# Patient Record
Sex: FEMALE | Race: WHITE | NOT HISPANIC OR LATINO | Employment: FULL TIME | URBAN - METROPOLITAN AREA
[De-identification: names, ages, dates, MRNs, and addresses within clinical notes are randomized per-mention and may not be internally consistent; named-entity substitution may affect disease eponyms.]

---

## 2019-03-29 ENCOUNTER — OFFICE VISIT (OUTPATIENT)
Dept: UROLOGY | Facility: CLINIC | Age: 54
End: 2019-03-29
Payer: OTHER GOVERNMENT

## 2019-03-29 VITALS
SYSTOLIC BLOOD PRESSURE: 114 MMHG | DIASTOLIC BLOOD PRESSURE: 60 MMHG | WEIGHT: 192.25 LBS | BODY MASS INDEX: 35.38 KG/M2 | HEART RATE: 77 BPM | HEIGHT: 62 IN

## 2019-03-29 DIAGNOSIS — Z87.442 HISTORY OF KIDNEY STONES: ICD-10-CM

## 2019-03-29 DIAGNOSIS — R10.9 FLANK PAIN: Primary | ICD-10-CM

## 2019-03-29 DIAGNOSIS — N13.39 OTHER HYDRONEPHROSIS: ICD-10-CM

## 2019-03-29 LAB
BILIRUB SERPL-MCNC: ABNORMAL MG/DL
BLOOD URINE, POC: ABNORMAL
COLOR, POC UA: YELLOW
GLUCOSE UR QL STRIP: ABNORMAL
KETONES UR QL STRIP: ABNORMAL
LEUKOCYTE ESTERASE URINE, POC: ABNORMAL
NITRITE, POC UA: ABNORMAL
PH, POC UA: 6
PROTEIN, POC: ABNORMAL
SPECIFIC GRAVITY, POC UA: 1
UROBILINOGEN, POC UA: ABNORMAL

## 2019-03-29 PROCEDURE — 99999 PR PBB SHADOW E&M-EST. PATIENT-LVL III: CPT | Mod: PBBFAC,,, | Performed by: UROLOGY

## 2019-03-29 PROCEDURE — 99203 PR OFFICE/OUTPT VISIT, NEW, LEVL III, 30-44 MIN: ICD-10-PCS | Mod: S$PBB,,, | Performed by: UROLOGY

## 2019-03-29 PROCEDURE — 99999 PR PBB SHADOW E&M-EST. PATIENT-LVL III: ICD-10-PCS | Mod: PBBFAC,,, | Performed by: UROLOGY

## 2019-03-29 PROCEDURE — 81002 URINALYSIS NONAUTO W/O SCOPE: CPT | Mod: PBBFAC,PO | Performed by: UROLOGY

## 2019-03-29 PROCEDURE — 99203 OFFICE O/P NEW LOW 30 MIN: CPT | Mod: S$PBB,,, | Performed by: UROLOGY

## 2019-03-29 PROCEDURE — 99213 OFFICE O/P EST LOW 20 MIN: CPT | Mod: PBBFAC,PO | Performed by: UROLOGY

## 2019-03-29 NOTE — PROGRESS NOTES
Subjective:       Patient ID: Felicia Ruffin is a 53 y.o. female.    Chief Complaint: Flank Pain    HPI     53 year old with lower back pain which is an intermittent problem.  The pain is not positional.  She denies associated nausea and no hematuria.  She has a history of kidney stones.  Renal ultrasound noted mild right hydronephrosis.  Today she is pain free.  She had previous lithotripsy 8-10 years ago but the pain she has now is not as severe.    Urine dipstick shows negative for nitrites, leukocytes, protein, positive for trace blood, glucose.       Past Medical History:   Diagnosis Date    Allergy     Asthma     Hyperlipidemia     Hypertension     Lumbago     PONV (postoperative nausea and vomiting)     Pre-diabetes     Skin sensitivity     Vitamin D deficiency      Past Surgical History:   Procedure Laterality Date     SECTION      times 1    CYSTOSCOPY, WITH RETROGRADE PYELOGRAM Left 10/10/2012    Performed by Brigido [facility] at [facility] OR    DILATION AND CURETTAGE OF UTERUS      LITHOTRIPSY, ESWL Left 10/10/2012    Performed by Brigido [facility] at [facility] OR    tonsillectomy      TONSILLECTOMY         Current Outpatient Medications:     atorvastatin (LIPITOR) 20 MG tablet, TAKE 1 TABLET AT BEDTIME, Disp: 90 tablet, Rfl: 2    azilsartan med-chlorthalidone (EDARBYCLOR) 40-25 mg Tab, Take by mouth., Disp: , Rfl:     budesonide-formoterol 80-4.5 mcg (SYMBICORT) 80-4.5 mcg/actuation HFAA, Inhale 2 puffs into the lungs. Controller, Disp: , Rfl:     cetirizine (ZYRTEC) 10 MG tablet, TAKE 1 TABLET DAILY, Disp: 90 tablet, Rfl: 1    cholecalciferol, vitamin D3, 2,000 unit Tab, 2 tablets. Every day, Disp: , Rfl:     empagliflozin-metformin (SYNJARDY XR) 12.5-1,000 mg TBph, Take by mouth., Disp: , Rfl:     folic acid (FOLVITE) 400 MCG tablet, 1 tablet. Every day, Disp: , Rfl:     meloxicam (MOBIC) 15 MG tablet, TAKE 1 TABLET DAILY, Disp: 90 tablet, Rfl: 1    metoprolol (TOPROL XL) 50 MG 24 hr  tablet, Take 1 tablet (50 mg total) by mouth once daily. Every day, Disp: 90 tablet, Rfl: 3    multivitamin (THERAGRAN) per tablet, 1 tablet. Every day, Disp: , Rfl:     potassium 99 mg Tab, Take by mouth once., Disp: , Rfl:     empagliflozin (JARDIANCE) 25 mg Tab, Take 25 mg by mouth., Disp: , Rfl:     Review of Systems   Constitutional: Negative for fever.   Eyes: Negative for visual disturbance.   Respiratory: Negative for shortness of breath.    Cardiovascular: Negative for chest pain.   Gastrointestinal: Negative for abdominal pain and nausea.   Genitourinary: Positive for flank pain. Negative for dysuria and hematuria.   Musculoskeletal: Negative for gait problem.   Skin: Negative for rash.   Neurological: Negative for seizures.   Psychiatric/Behavioral: Negative for confusion.       Objective:      Physical Exam   Constitutional: She is oriented to person, place, and time. She appears well-developed and well-nourished.   HENT:   Head: Normocephalic.   Eyes: Conjunctivae and EOM are normal.   Neck: Normal range of motion.   Cardiovascular: Normal rate.   Pulmonary/Chest: Effort normal.   Abdominal: Soft. She exhibits no distension and no mass. There is no tenderness.   Genitourinary:   Genitourinary Comments: Bladder non-tender and nondistended  No CVA tenderness   Musculoskeletal: She exhibits no edema.   Neurological: She is alert and oriented to person, place, and time.   Skin: Skin is warm and dry. No rash noted. No erythema.   Psychiatric: She has a normal mood and affect. Her behavior is normal.   Vitals reviewed.      Assessment:       1. Flank pain    2. Other hydronephrosis    3. History of kidney stones        Plan:       Flank pain  -     POCT URINE DIPSTICK WITHOUT MICROSCOPE  -     CT Renal Stone Study ABD Pelvis WO; Future; Expected date: 03/29/2019    Other hydronephrosis    History of kidney stones

## 2019-04-11 ENCOUNTER — HOSPITAL ENCOUNTER (OUTPATIENT)
Dept: RADIOLOGY | Facility: HOSPITAL | Age: 54
Discharge: HOME | End: 2019-04-11
Attending: UROLOGY
Payer: OTHER GOVERNMENT

## 2019-04-11 DIAGNOSIS — R10.9 FLANK PAIN: ICD-10-CM

## 2019-04-11 PROCEDURE — 74176 CT RENAL STONE STUDY ABD PELVIS WO: ICD-10-PCS | Mod: 26,,, | Performed by: RADIOLOGY

## 2019-04-11 PROCEDURE — 74176 CT ABD & PELVIS W/O CONTRAST: CPT | Mod: TC,PO

## 2019-04-11 PROCEDURE — 74176 CT ABD & PELVIS W/O CONTRAST: CPT | Mod: 26,,, | Performed by: RADIOLOGY

## 2020-01-02 ENCOUNTER — TELEPHONE (OUTPATIENT)
Dept: SURGERY | Facility: CLINIC | Age: 55
End: 2020-01-02

## 2020-01-02 NOTE — PROGRESS NOTES
Called pt to see if she has an outside testing done, left msg to bring records with her, call back number given

## 2020-01-07 ENCOUNTER — OFFICE VISIT (OUTPATIENT)
Dept: SURGERY | Facility: CLINIC | Age: 55
End: 2020-01-07
Payer: OTHER GOVERNMENT

## 2020-01-07 VITALS
WEIGHT: 192 LBS | DIASTOLIC BLOOD PRESSURE: 74 MMHG | SYSTOLIC BLOOD PRESSURE: 155 MMHG | BODY MASS INDEX: 35.33 KG/M2 | HEART RATE: 89 BPM | HEIGHT: 62 IN

## 2020-01-07 DIAGNOSIS — K60.1 CHRONIC ANTERIOR ANAL FISSURE: Primary | ICD-10-CM

## 2020-01-07 DIAGNOSIS — K64.4 RESIDUAL HEMORRHOIDAL SKIN TAGS: ICD-10-CM

## 2020-01-07 PROCEDURE — 99999 PR PBB SHADOW E&M-EST. PATIENT-LVL III: CPT | Mod: PBBFAC,,, | Performed by: COLON & RECTAL SURGERY

## 2020-01-07 PROCEDURE — 99203 OFFICE O/P NEW LOW 30 MIN: CPT | Mod: S$PBB,,, | Performed by: COLON & RECTAL SURGERY

## 2020-01-07 PROCEDURE — 99203 PR OFFICE/OUTPT VISIT, NEW, LEVL III, 30-44 MIN: ICD-10-PCS | Mod: S$PBB,,, | Performed by: COLON & RECTAL SURGERY

## 2020-01-07 PROCEDURE — 99999 PR PBB SHADOW E&M-EST. PATIENT-LVL III: ICD-10-PCS | Mod: PBBFAC,,, | Performed by: COLON & RECTAL SURGERY

## 2020-01-07 PROCEDURE — 99213 OFFICE O/P EST LOW 20 MIN: CPT | Mod: PBBFAC | Performed by: COLON & RECTAL SURGERY

## 2020-01-07 RX ORDER — NAPROXEN SODIUM 220 MG/1
81 TABLET, FILM COATED ORAL NIGHTLY
COMMUNITY

## 2020-01-07 RX ORDER — HYDROCHLOROTHIAZIDE 12.5 MG/1
12.5 TABLET ORAL 2 TIMES DAILY
COMMUNITY
End: 2021-11-22

## 2020-01-07 NOTE — LETTER
January 7, 2020      Britton Bernardo MD  [address] [address]           [facility]  [address]  [facility] [address]  Phone: [phone]          Patient: Felicia Ruffin   MR Number: [id number]   YOB: 1965   Date of Visit: 1/7/2020       Dear Dr. Britton Bernardo:    Thank you for referring Felicia Ruffin to me for evaluation. Attached you will find relevant portions of my assessment and plan of care.    If you have questions, please do not hesitate to call me. I look forward to following Felicia Ruffin along with you.    Sincerely,    Brigido Contreras MD    Enclosure  CC:  No Recipients    If you would like to receive this communication electronically, please contact [email or web address] or [phone] to request more information on [employer] access.    For providers and/or their staff who would like to refer a patient to [facility], please contact us through our one-stop-shop provider referral line, [facility], at [phone].    If you feel you have received this communication in error or would no longer like to receive these types of communications, please e-mail [email or web address]

## 2020-01-07 NOTE — PROGRESS NOTES
CRS Office Visit History and Physical    Referring Md:   Britton Bernardo Md  [address] [place] [address]    SUBJECTIVE:     Chief Complaint:  Anal fissure    History of Present Illness:  The patient is new patient to this practice.   Course is as follows:  Patient is a 54 y.o. female presents with anal fissure.  Fourteen year history of perianal pain with bowel movements.  Intermittently improves with taking NSAIDs.  Complains of intermittent bleeding with her bowel movements as well as intermittent tearing perianal pain. She has bowel movements daily and spends approximately 20 min on the toilet for each bowel movement.  No family history of colon rectal cancer.  No family history of inflammatory bowel disease.  No episodes of fecal incontinence. No past anorectal surgeries.    Last Colonoscopy: 2016  Diminutive polyp in the rectum cold biopsied.  Pathology demonstrated hyperplastic polyp.  Intermittent anal fissure seen.      Review of patient's allergies indicates:   Allergen Reactions    Codeine Itching    Other      Tape-sensitive skin    Percodan [oxycodone hcl-oxycodone-asa] Itching    Vicodin [hydrocodone-acetaminophen] Itching       Past Medical History:   Diagnosis Date    Allergy     Asthma     Hyperlipidemia     Hypertension     Lumbago     PONV (postoperative nausea and vomiting)     Pre-diabetes     Skin sensitivity     Vitamin D deficiency      Past Surgical History:   Procedure Laterality Date     SECTION      times 1    DILATION AND CURETTAGE OF UTERUS      tonsillectomy      TONSILLECTOMY       Family History   Problem Relation Age of Onset    Stroke Father     Hypertension Father     Heart disease Father     Hypertension Mother      Social History     Tobacco Use    Smoking status: Never Smoker    Smokeless tobacco: Never Used   Substance Use Topics    Alcohol use: No    Drug use: No        Review of Systems:  Review of Systems   Constitutional:  "Negative for chills, diaphoresis, fever, malaise/fatigue and weight loss.   HENT: Negative for congestion.    Respiratory: Negative for shortness of breath.    Cardiovascular: Negative for chest pain and leg swelling.   Gastrointestinal: Positive for blood in stool. Negative for abdominal pain, constipation, nausea and vomiting.   Genitourinary: Negative for dysuria.   Musculoskeletal: Negative for back pain and myalgias.   Skin: Negative for rash.   Neurological: Negative for dizziness and weakness.   Endo/Heme/Allergies: Does not bruise/bleed easily.   Psychiatric/Behavioral: Negative for depression.       OBJECTIVE:     Vital Signs (Most Recent)  BP (!) 155/74 (BP Location: Right arm, Patient Position: Sitting, BP Method: Large (Automatic))   Pulse 89   Ht 5' 2" (1.575 m)   Wt 87.1 kg (192 lb 0.3 oz)   LMP 07/01/2017   BMI 35.12 kg/m²     Physical Exam:  General: White female in no distress   Neuro: alert and oriented x 4.  Moves all extremities.     HEENT: no icterus.  Trachea midline  Respiratory: respirations are even and unlabored  Cardiac: regular rate  Abdomen:  Soft, nontender, no masses  Extremities: Warm dry and intact  Skin: no rashes  Anorectal:  External exam with small volume external hemorrhoids.  Hypertonic internal anal sphincter.  Tender in the anterior midline consistent with anterior anal fissure    Labs: H&H 12 and 38.  Albumin 4.2.  Creatinine normal.    Imaging:  No recent abdominal imaging      ASSESSMENT/PLAN:     Felicia VINSON was seen today for anal fissure.    Diagnoses and all orders for this visit:    Chronic anterior anal fissure  -     Case Request Operating Room: SPHINCTEROTOMY, ANAL, INTERNAL, LATERAL, & excisional hemorrhoidectomy, prone    Residual hemorrhoidal skin tags  -     Case Request Operating Room: SPHINCTEROTOMY, ANAL, INTERNAL, LATERAL, & excisional hemorrhoidectomy, prone        54-year-old woman with longstanding perianal pain and exam consistent with anterior midline " anal fissure with associated perianal skin tags and external hemorrhoids.  Discussed that due to the longstanding nature of the fissure, topical medicine is unlikely to be beneficial to her.  Therefore, recommended limited lateral internal anal sphincterotomy.  At that time, limited excisional external hemorrhoidectomy can also be performed.  We discussed the expected follow-up of excisional hemorrhoidectomy to be approximately 4 weeks.  Consents were signed today.  She will need to be in prone position.  She is significant reactions to the multiple pain medications.  Postoperatively, she will need to be given oral Dilaudid as well as Benadryl to help with itching.  We can also try gabapentin for pain relief.    MARICRUZ Contreras MD, FACS  Staff Surgeon  Colon & Rectal Surgery

## 2020-03-06 ENCOUNTER — TELEPHONE (OUTPATIENT)
Dept: SURGERY | Facility: CLINIC | Age: 55
End: 2020-03-06

## 2020-03-06 NOTE — TELEPHONE ENCOUNTER
----- Message from Farideh Dexter sent at 3/6/2020  8:22 AM CST -----  Contact: pt#[phone]  Pt is calling to reschedule procedure. Please call

## 2020-04-14 ENCOUNTER — TELEPHONE (OUTPATIENT)
Dept: SURGERY | Facility: CLINIC | Age: 55
End: 2020-04-14

## 2020-04-14 NOTE — TELEPHONE ENCOUNTER
Notified pt that Dr Contreras is unavailable that day , she states the following week 6/29 would be fine.  Will notify scheduling

## 2020-04-14 NOTE — TELEPHONE ENCOUNTER
----- Message from Farideh Dexter sent at 4/14/2020 12:07 PM CDT -----  Contact: pt   Pt is calling to reschedule surgery for June 22

## 2020-05-21 ENCOUNTER — TELEPHONE (OUTPATIENT)
Dept: SURGERY | Facility: CLINIC | Age: 55
End: 2020-05-21

## 2020-05-21 DIAGNOSIS — Z01.818 PREOP TESTING: Primary | ICD-10-CM

## 2020-06-19 ENCOUNTER — TELEPHONE (OUTPATIENT)
Dept: SURGERY | Facility: CLINIC | Age: 55
End: 2020-06-19

## 2020-06-19 NOTE — TELEPHONE ENCOUNTER
----- Message from Farideh Dexter sent at 6/19/2020 10:39 AM CDT -----  Felicia Ruffin calling regarding labs that was done by PCP and her count was low. Please call @[phone]

## 2020-06-19 NOTE — TELEPHONE ENCOUNTER
Returned call, pt states that blood count was low on blood work drawn by PCP, 9.9.   Two months ago 9.4 . Pt denies SOB, occasionally BRBPR, , denies more than a cup of blood or blood running down legs. Pt to fax lab work on Monday.  Instructed to call back if symptoms change. Verbalized understanding  understanding

## 2020-06-25 ENCOUNTER — CLINICAL SUPPORT (OUTPATIENT)
Dept: URGENT CARE | Facility: CLINIC | Age: 55
End: 2020-06-25
Payer: OTHER GOVERNMENT

## 2020-06-25 DIAGNOSIS — Z01.818 PREOP TESTING: ICD-10-CM

## 2020-06-25 PROCEDURE — U0003 INFECTIOUS AGENT DETECTION BY NUCLEIC ACID (DNA OR RNA); SEVERE ACUTE RESPIRATORY SYNDROME CORONAVIRUS 2 (SARS-COV-2) (CORONAVIRUS DISEASE [COVID-19]), AMPLIFIED PROBE TECHNIQUE, MAKING USE OF HIGH THROUGHPUT TECHNOLOGIES AS DESCRIBED BY CMS-2020-01-R: HCPCS

## 2020-06-26 ENCOUNTER — LAB VISIT (OUTPATIENT)
Dept: FAMILY MEDICINE | Facility: CLINIC | Age: 55
End: 2020-06-26
Payer: OTHER GOVERNMENT

## 2020-06-26 ENCOUNTER — TELEPHONE (OUTPATIENT)
Dept: SURGERY | Facility: CLINIC | Age: 55
End: 2020-06-26

## 2020-06-26 DIAGNOSIS — Z01.818 PREOP TESTING: ICD-10-CM

## 2020-06-26 DIAGNOSIS — Z01.818 PREOP TESTING: Primary | ICD-10-CM

## 2020-06-26 LAB — SARS-COV-2 RNA RESP QL NAA+PROBE: NOT DETECTED

## 2020-06-26 PROCEDURE — U0003 INFECTIOUS AGENT DETECTION BY NUCLEIC ACID (DNA OR RNA); SEVERE ACUTE RESPIRATORY SYNDROME CORONAVIRUS 2 (SARS-COV-2) (CORONAVIRUS DISEASE [COVID-19]), AMPLIFIED PROBE TECHNIQUE, MAKING USE OF HIGH THROUGHPUT TECHNOLOGIES AS DESCRIBED BY CMS-2020-01-R: HCPCS

## 2020-06-26 RX ORDER — ACETAMINOPHEN 500 MG
1000 TABLET ORAL
COMMUNITY

## 2020-06-26 RX ORDER — BENZONATATE 100 MG/1
100 CAPSULE ORAL 3 TIMES DAILY PRN
COMMUNITY
End: 2021-11-22

## 2020-06-26 NOTE — PRE-PROCEDURE INSTRUCTIONS
PREOP INSTRUCTIONS:  NPO INSTRUCTIONS GIVEN PER CRS DEPT.    PT REPEATED BACK INSTRUCTIONS CORRECTLY AND VERBALIZED A COMPLETE UNDERSTANDING - # GIVEN TO POC FOR ANY QUESTIONS OR CONCERNS.  Clear liquids are allowed up to 2 hours before procedure. 0500 Clear liquids are:water,apple juice,gatorade & powerade.Shower instructions as well as directions to the Day of Surgery Center were given.Patient encouraged to wear loose fitting,comfortable clothing.Medication instructions for pm prior to and am of procedure reviewed.Instructed patient to avoid taking vitamins,supplements,aspirin and ibuprofen the morning of surgery. Patient stated an understanding.Patient instructed to take temperature the night before surgery as well as the morning of surgery and to notify [facility] at [phone] if it is 100.4 or above.Patient also informed of the current visitor policy and advised patient that one visitor may accompany them into the hospital and wait (socially distanced) .When they enter the hospital both patient and visitor will have their temperature checked,provided a mask and provided assistance to their destination.   Inpatients are allowed 1 visitor/day from 10 am until 6 pm.     Covid screening completed 6/26/2020 and results are pending.   Patient reports h/o PONV - reports Zofran causes bradycardia/     - [facility] WILL BE PROVIDING TRANSPORTATION HOME UPON DISCHARGE.    4/3/20 - Animal/dog bite to RLE - Medial posterior aspect  just below knee ~ 5-7 cm  Pt has been treated w/Antibiotics and site has been I&D  Pt reports site remains painful, denies any drainage or redness.

## 2020-06-26 NOTE — TELEPHONE ENCOUNTER
Pt informed of arrival time of 500am for 6/29 surgery. Pt had covid test done yesterday, states she called the facility doing the test and was told it was a 72 hour turn around and they told her to go yesterday.  Covid scheduled again to today, 6/26 at 1125 for surgery on Monday.

## 2020-06-27 LAB — SARS-COV-2 RNA RESP QL NAA+PROBE: NOT DETECTED

## 2020-06-29 ENCOUNTER — HOSPITAL ENCOUNTER (OUTPATIENT)
Facility: HOSPITAL | Age: 55
Discharge: HOME | End: 2020-06-29
Attending: COLON & RECTAL SURGERY | Admitting: COLON & RECTAL SURGERY
Payer: OTHER GOVERNMENT

## 2020-06-29 ENCOUNTER — ANESTHESIA (OUTPATIENT)
Dept: SURGERY | Facility: HOSPITAL | Age: 55
End: 2020-06-29
Payer: OTHER GOVERNMENT

## 2020-06-29 ENCOUNTER — ANESTHESIA EVENT (OUTPATIENT)
Dept: SURGERY | Facility: HOSPITAL | Age: 55
End: 2020-06-29
Payer: OTHER GOVERNMENT

## 2020-06-29 ENCOUNTER — TELEPHONE (OUTPATIENT)
Dept: SURGERY | Facility: CLINIC | Age: 55
End: 2020-06-29

## 2020-06-29 VITALS
WEIGHT: 195 LBS | SYSTOLIC BLOOD PRESSURE: 151 MMHG | TEMPERATURE: 98 F | HEART RATE: 70 BPM | DIASTOLIC BLOOD PRESSURE: 61 MMHG | BODY MASS INDEX: 35.88 KG/M2 | RESPIRATION RATE: 12 BRPM | OXYGEN SATURATION: 95 % | HEIGHT: 62 IN

## 2020-06-29 DIAGNOSIS — K60.2 ANAL FISSURE: Primary | ICD-10-CM

## 2020-06-29 LAB
POCT GLUCOSE: 119 MG/DL (ref 70–110)
POCT GLUCOSE: 124 MG/DL (ref 70–110)

## 2020-06-29 PROCEDURE — 25000003 PHARM REV CODE 250: Performed by: NURSE ANESTHETIST, CERTIFIED REGISTERED

## 2020-06-29 PROCEDURE — D9220A PRA ANESTHESIA: ICD-10-PCS | Mod: ANES,,, | Performed by: ANESTHESIOLOGY

## 2020-06-29 PROCEDURE — 71000015 HC POSTOP RECOV 1ST HR: Performed by: COLON & RECTAL SURGERY

## 2020-06-29 PROCEDURE — 36000707: Performed by: COLON & RECTAL SURGERY

## 2020-06-29 PROCEDURE — 63600175 PHARM REV CODE 636 W HCPCS: Performed by: ANESTHESIOLOGY

## 2020-06-29 PROCEDURE — 63600175 PHARM REV CODE 636 W HCPCS: Performed by: NURSE ANESTHETIST, CERTIFIED REGISTERED

## 2020-06-29 PROCEDURE — 82962 GLUCOSE BLOOD TEST: CPT | Performed by: COLON & RECTAL SURGERY

## 2020-06-29 PROCEDURE — 25000003 PHARM REV CODE 250: Performed by: ANESTHESIOLOGY

## 2020-06-29 PROCEDURE — 00902 ANES ANORECTAL PX: CPT | Performed by: COLON & RECTAL SURGERY

## 2020-06-29 PROCEDURE — 25000003 PHARM REV CODE 250: Performed by: NURSE PRACTITIONER

## 2020-06-29 PROCEDURE — D9220A PRA ANESTHESIA: Mod: CRNA,,, | Performed by: NURSE ANESTHETIST, CERTIFIED REGISTERED

## 2020-06-29 PROCEDURE — 71000044 HC DOSC ROUTINE RECOVERY FIRST HOUR: Performed by: COLON & RECTAL SURGERY

## 2020-06-29 PROCEDURE — 37000009 HC ANESTHESIA EA ADD 15 MINS: Performed by: COLON & RECTAL SURGERY

## 2020-06-29 PROCEDURE — D9220A PRA ANESTHESIA: ICD-10-PCS | Mod: CRNA,,, | Performed by: NURSE ANESTHETIST, CERTIFIED REGISTERED

## 2020-06-29 PROCEDURE — 25000003 PHARM REV CODE 250: Performed by: COLON & RECTAL SURGERY

## 2020-06-29 PROCEDURE — 27201423 OPTIME MED/SURG SUP & DEVICES STERILE SUPPLY: Performed by: COLON & RECTAL SURGERY

## 2020-06-29 PROCEDURE — 46080 SPHNCTROTMY ANAL DIV SPHNCTR: CPT | Mod: ,,, | Performed by: COLON & RECTAL SURGERY

## 2020-06-29 PROCEDURE — 46080 PR ANAL SPHINCTEROTOMY: ICD-10-PCS | Mod: ,,, | Performed by: COLON & RECTAL SURGERY

## 2020-06-29 PROCEDURE — 94761 N-INVAS EAR/PLS OXIMETRY MLT: CPT

## 2020-06-29 PROCEDURE — D9220A PRA ANESTHESIA: Mod: ANES,,, | Performed by: ANESTHESIOLOGY

## 2020-06-29 PROCEDURE — 37000008 HC ANESTHESIA 1ST 15 MINUTES: Performed by: COLON & RECTAL SURGERY

## 2020-06-29 PROCEDURE — 36000706: Performed by: COLON & RECTAL SURGERY

## 2020-06-29 RX ORDER — GABAPENTIN 100 MG/1
100 CAPSULE ORAL 3 TIMES DAILY
Qty: 90 CAPSULE | Refills: 0 | Status: SHIPPED | OUTPATIENT
Start: 2020-06-29 | End: 2020-07-09

## 2020-06-29 RX ORDER — SODIUM CHLORIDE 9 MG/ML
INJECTION, SOLUTION INTRAVENOUS CONTINUOUS
Status: ACTIVE | OUTPATIENT
Start: 2020-06-29

## 2020-06-29 RX ORDER — LIDOCAINE HCL/PF 100 MG/5ML
SYRINGE (ML) INTRAVENOUS
Status: DISCONTINUED | OUTPATIENT
Start: 2020-06-29 | End: 2020-06-29

## 2020-06-29 RX ORDER — DEXAMETHASONE SODIUM PHOSPHATE 4 MG/ML
INJECTION, SOLUTION INTRA-ARTICULAR; INTRALESIONAL; INTRAMUSCULAR; INTRAVENOUS; SOFT TISSUE
Status: DISCONTINUED | OUTPATIENT
Start: 2020-06-29 | End: 2020-06-29

## 2020-06-29 RX ORDER — SODIUM CHLORIDE 0.9 % (FLUSH) 0.9 %
3 SYRINGE (ML) INJECTION
Status: DISCONTINUED | OUTPATIENT
Start: 2020-06-29 | End: 2020-06-29 | Stop reason: HOSPADM

## 2020-06-29 RX ORDER — MIDAZOLAM HYDROCHLORIDE 1 MG/ML
INJECTION, SOLUTION INTRAMUSCULAR; INTRAVENOUS
Status: DISCONTINUED | OUTPATIENT
Start: 2020-06-29 | End: 2020-06-29

## 2020-06-29 RX ORDER — FENTANYL CITRATE 50 UG/ML
INJECTION, SOLUTION INTRAMUSCULAR; INTRAVENOUS
Status: DISCONTINUED | OUTPATIENT
Start: 2020-06-29 | End: 2020-06-29

## 2020-06-29 RX ORDER — HYDROMORPHONE HYDROCHLORIDE 2 MG/1
2 TABLET ORAL EVERY 4 HOURS PRN
Qty: 15 TABLET | Refills: 0 | Status: SHIPPED | OUTPATIENT
Start: 2020-06-29 | End: 2020-07-09

## 2020-06-29 RX ORDER — PROCHLORPERAZINE EDISYLATE 5 MG/ML
5 INJECTION INTRAMUSCULAR; INTRAVENOUS ONCE
Status: COMPLETED | OUTPATIENT
Start: 2020-06-29 | End: 2020-06-29

## 2020-06-29 RX ORDER — PROPOFOL 10 MG/ML
INJECTION, EMULSION INTRAVENOUS
Status: DISCONTINUED | OUTPATIENT
Start: 2020-06-29 | End: 2020-06-29

## 2020-06-29 RX ORDER — MUPIROCIN 20 MG/G
OINTMENT TOPICAL
Status: DISPENSED | OUTPATIENT
Start: 2020-06-29

## 2020-06-29 RX ORDER — BUPIVACAINE HYDROCHLORIDE 2.5 MG/ML
INJECTION, SOLUTION EPIDURAL; INFILTRATION; INTRACAUDAL
Status: DISCONTINUED | OUTPATIENT
Start: 2020-06-29 | End: 2020-06-29 | Stop reason: HOSPADM

## 2020-06-29 RX ORDER — DIPHENHYDRAMINE HCL 50 MG
50 CAPSULE ORAL EVERY 6 HOURS PRN
Qty: 20 CAPSULE | Refills: 0 | Status: SHIPPED | OUTPATIENT
Start: 2020-06-29 | End: 2021-11-22

## 2020-06-29 RX ORDER — SUCCINYLCHOLINE CHLORIDE 20 MG/ML
INJECTION INTRAMUSCULAR; INTRAVENOUS
Status: DISCONTINUED | OUTPATIENT
Start: 2020-06-29 | End: 2020-06-29

## 2020-06-29 RX ORDER — ROCURONIUM BROMIDE 10 MG/ML
INJECTION, SOLUTION INTRAVENOUS
Status: DISCONTINUED | OUTPATIENT
Start: 2020-06-29 | End: 2020-06-29

## 2020-06-29 RX ORDER — SCOLOPAMINE TRANSDERMAL SYSTEM 1 MG/1
1 PATCH, EXTENDED RELEASE TRANSDERMAL
Status: COMPLETED | OUTPATIENT
Start: 2020-06-29 | End: 2020-06-29

## 2020-06-29 RX ADMIN — ROCURONIUM BROMIDE 10 MG: 10 INJECTION, SOLUTION INTRAVENOUS at 07:06

## 2020-06-29 RX ADMIN — FENTANYL CITRATE 25 MCG: 50 INJECTION, SOLUTION INTRAMUSCULAR; INTRAVENOUS at 07:06

## 2020-06-29 RX ADMIN — SODIUM CHLORIDE, SODIUM GLUCONATE, SODIUM ACETATE, POTASSIUM CHLORIDE, MAGNESIUM CHLORIDE, SODIUM PHOSPHATE, DIBASIC, AND POTASSIUM PHOSPHATE: .53; .5; .37; .037; .03; .012; .00082 INJECTION, SOLUTION INTRAVENOUS at 07:06

## 2020-06-29 RX ADMIN — PROPOFOL 200 MG: 10 INJECTION, EMULSION INTRAVENOUS at 07:06

## 2020-06-29 RX ADMIN — SUGAMMADEX 200 MG: 100 INJECTION, SOLUTION INTRAVENOUS at 07:06

## 2020-06-29 RX ADMIN — DEXAMETHASONE SODIUM PHOSPHATE 8 MG: 4 INJECTION, SOLUTION INTRAMUSCULAR; INTRAVENOUS at 07:06

## 2020-06-29 RX ADMIN — SCOPALAMINE 1 PATCH: 1 PATCH, EXTENDED RELEASE TRANSDERMAL at 06:06

## 2020-06-29 RX ADMIN — PROPOFOL 50 MG: 10 INJECTION, EMULSION INTRAVENOUS at 07:06

## 2020-06-29 RX ADMIN — PROCHLORPERAZINE EDISYLATE 5 MG: 5 INJECTION INTRAMUSCULAR; INTRAVENOUS at 08:06

## 2020-06-29 RX ADMIN — MIDAZOLAM HYDROCHLORIDE 2 MG: 1 INJECTION, SOLUTION INTRAMUSCULAR; INTRAVENOUS at 06:06

## 2020-06-29 RX ADMIN — MUPIROCIN: 20 OINTMENT TOPICAL at 06:06

## 2020-06-29 RX ADMIN — FENTANYL CITRATE 50 MCG: 50 INJECTION, SOLUTION INTRAMUSCULAR; INTRAVENOUS at 07:06

## 2020-06-29 RX ADMIN — SUCCINYLCHOLINE CHLORIDE 160 MG: 20 INJECTION, SOLUTION INTRAMUSCULAR; INTRAVENOUS at 07:06

## 2020-06-29 RX ADMIN — SODIUM CHLORIDE: 0.9 INJECTION, SOLUTION INTRAVENOUS at 06:06

## 2020-06-29 RX ADMIN — LIDOCAINE HYDROCHLORIDE 60 MG: 20 INJECTION, SOLUTION INTRAVENOUS at 07:06

## 2020-06-29 NOTE — SUBJECTIVE & OBJECTIVE
PTA Medications   Medication Sig    acetaminophen (TYLENOL EXTRA STRENGTH ORAL) Take 500 mg by mouth every 6 to 8 hours as needed.    aspirin 81 MG Chew Take 81 mg by mouth nightly.    atorvastatin (LIPITOR) 20 MG tablet TAKE 1 TABLET AT BEDTIME    benzonatate (TESSALON) 100 MG capsule Take 100 mg by mouth 3 (three) times daily as needed for Cough.    budesonide-formoterol 80-4.5 mcg (SYMBICORT) 80-4.5 mcg/actuation HFAA Inhale 2 puffs into the lungs. Controller    cetirizine (ZYRTEC) 10 MG tablet TAKE 1 TABLET DAILY (Patient taking differently: Take 10 mg by mouth every evening. )    cholecalciferol, vitamin D3, 2,000 unit Tab 2 tablets. Every day    empagliflozin-metformin (SYNJARDY XR) 12.5-1,000 mg TBph Take 1 tablet by mouth 2 (two) times a day.     hydroCHLOROthiazide (HYDRODIURIL) 12.5 MG Tab Take 12.5 mg by mouth as needed.    meloxicam (MOBIC) 15 MG tablet TAKE 1 TABLET DAILY    potassium 99 mg Tab Take by mouth once.    azilsartan med-chlorthalidone (EDARBYCLOR) 40-25 mg Tab Take by mouth.    folic acid (FOLVITE) 400 MCG tablet 1 tablet. Every day    metoprolol (TOPROL XL) 50 MG 24 hr tablet Take 1 tablet (50 mg total) by mouth once daily. Every day    multivitamin (THERAGRAN) per tablet 1 tablet. Every day       Review of patient's allergies indicates:   Allergen Reactions    Other Itching     Tape-sensitive skin    Zofran [ondansetron hcl] Other (See Comments)     Pt reports medication causes Bradycardia.    Codeine Itching    Percodan [oxycodone hcl-oxycodone-asa] Itching    Vicodin [hydrocodone-acetaminophen] Itching       Past Medical History:   Diagnosis Date    Allergy     Asthma     Hyperlipidemia     Hypertension     Lumbago     PONV (postoperative nausea and vomiting)     Pre-diabetes     Skin sensitivity     Vitamin D deficiency      Past Surgical History:   Procedure Laterality Date     SECTION      times 1    DILATION AND CURETTAGE OF UTERUS       tonsillectomy      TONSILLECTOMY       Family History     Problem Relation (Age of Onset)    Heart disease Father    Hypertension Father, Mother    Stroke Father        Tobacco Use    Smoking status: Never Smoker    Smokeless tobacco: Never Used   Substance and Sexual Activity    Alcohol use: No    Drug use: No    Sexual activity: Not on file     Review of Systems   Constitutional: Negative.    HENT: Negative.    Eyes: Negative.    Respiratory: Negative.    Cardiovascular: Negative.    Gastrointestinal:        Anal pain   Endocrine: Negative.    Genitourinary: Negative.    Musculoskeletal: Negative.    Skin: Negative.    Allergic/Immunologic: Negative.    Neurological: Negative.    Hematological: Negative.    Psychiatric/Behavioral: Negative.      Objective:     Vital Signs (Most Recent):  Temp: 98.4 °F (36.9 °C) (06/29/20 0534)  Pulse: 76 (06/29/20 0534)  Resp: 18 (06/29/20 0534)  BP: (!) 160/72 (06/29/20 0534)  SpO2: 98 % (06/29/20 0534) Vital Signs (24h Range):  Temp:  [98.4 °F (36.9 °C)] 98.4 °F (36.9 °C)  Pulse:  [76] 76  Resp:  [18] 18  SpO2:  [98 %] 98 %  BP: (160)/(72) 160/72     Weight: 88.5 kg (195 lb)  Body mass index is 35.67 kg/m².    Physical Exam  HENT:      Head: Normocephalic.   Eyes:      Conjunctiva/sclera: Conjunctivae normal.   Neck:      Musculoskeletal: Normal range of motion.   Cardiovascular:      Rate and Rhythm: Normal rate.   Pulmonary:      Effort: Pulmonary effort is normal.   Abdominal:      General: Abdomen is flat.      Palpations: Abdomen is soft.   Musculoskeletal: Normal range of motion.   Skin:     General: Skin is warm and dry.   Neurological:      General: No focal deficit present.      Mental Status: She is alert and oriented to person, place, and time.   Psychiatric:         Mood and Affect: Mood normal.         Behavior: Behavior normal.           Significant Labs:  BMP (Last 3 Results): No results for input(s): GLU, NA, K, CL, CO2, BUN, CREATININE, CALCIUM, MG in the  last 168 hours.  CBC (Last 3 Results): No results for input(s): WBC, RBC, HGB, HCT, PLT, MCV, MCH, MCHC in the last 168 hours.    Significant Diagnostics:  None

## 2020-06-29 NOTE — BRIEF OP NOTE
[facility]  Brief Operative Note    Surgery Date: 6/29/2020     Surgeon(s) and Role:     * Brigido Contreras MD - Primary     * Sanju Keyes MD - Fellow    Assisting Surgeon: None    Pre-op Diagnosis:  Chronic anterior anal fissure [K60.1]  Residual hemorrhoidal skin tags [K64.4]    Post-op Diagnosis:  Post-Op Diagnosis Codes:     * Chronic anterior anal fissure [K60.1]     * Residual hemorrhoidal skin tags [K64.4]    Procedure(s) (LRB):  SPHINCTEROTOMY, ANAL, INTERNAL, LATERAL (N/A)    Anesthesia: General    Description of the findings of the procedure(s): posterior and anterior midline fissures. Mildly hypertonic anal sphincter. Non-bleeding small internal/external hemorrhoids    Estimated Blood Loss: * No values recorded between 6/29/2020  7:24 AM and 6/29/2020  7:37 AM *         Specimens:   Specimen (12h ago, onward)    None            Discharge Note    OUTCOME: Patient tolerated treatment/procedure well without complication and is now ready for discharge.    DISPOSITION: Home or Self Care    FINAL DIAGNOSIS: anal fissure    FOLLOWUP: In clinic    DISCHARGE INSTRUCTIONS:   Anal Surgery Post Op Instructions:    You had a sphincterotomy performed today.     Wound care:  Expect some drainage over the next few weeks as the wound heals.  Please wear a pad to help with drainage.     You have no activity restrictions.   No dietary restrictions.    Medications:  A narcotic pain medication has been prescribed.  Please start to wean the pain medication over the following few days.  You can take tylenol instead of the pain medication.      Please take miralax 1 capful at night to prevent constipation associated with narcotic pain medications.      Follow up:  Return to clinic in 4 weeks for follow up and wound check.    You may return to work    MARICRUZ Contreras MD  Staff Surgeon  Colon & Rectal Surgery

## 2020-06-29 NOTE — ASSESSMENT & PLAN NOTE
54 year old female with chronic anal fissure, nonhealing despite non-operative efforts. Will plan for lateral internal sphincterotomy, possible excision of hemorrhoid today

## 2020-06-29 NOTE — H&P
[facility]  Colorectal Surgery  History & Physical    Patient Name: Felicia Ruffin  MRN: [id number]  Admission Date: 6/29/2020  Attending Physician: Brigido Contreras MD   Primary Care Provider: Britton Bernardo MD    Subjective:     Chief Complaint/Reason for Admission: anal fissure    History of Present Illness:  No changes since office visit in January:    Patient is a 54 y.o. female presents with anal fissure.  Fourteen year history of perianal pain with bowel movements.  Intermittently improves with taking NSAIDs.  Complains of intermittent bleeding with her bowel movements as well as intermittent tearing perianal pain. She has bowel movements daily and spends approximately 20 min on the toilet for each bowel movement.  No family history of colon rectal cancer.  No family history of inflammatory bowel disease.  No episodes of fecal incontinence. No past anorectal surgeries.     Last Colonoscopy: 01/25/2016  Diminutive polyp in the rectum cold biopsied.  Pathology demonstrated hyperplastic polyp.  Intermittent anal fissure seen.    Presents today for surgical treatment of anal fissure    PTA Medications   Medication Sig    acetaminophen (TYLENOL EXTRA STRENGTH ORAL) Take 500 mg by mouth every 6 to 8 hours as needed.    aspirin 81 MG Chew Take 81 mg by mouth nightly.    atorvastatin (LIPITOR) 20 MG tablet TAKE 1 TABLET AT BEDTIME    benzonatate (TESSALON) 100 MG capsule Take 100 mg by mouth 3 (three) times daily as needed for Cough.    budesonide-formoterol 80-4.5 mcg (SYMBICORT) 80-4.5 mcg/actuation HFAA Inhale 2 puffs into the lungs. Controller    cetirizine (ZYRTEC) 10 MG tablet TAKE 1 TABLET DAILY (Patient taking differently: Take 10 mg by mouth every evening. )    cholecalciferol, vitamin D3, 2,000 unit Tab 2 tablets. Every day    empagliflozin-metformin (SYNJARDY XR) 12.5-1,000 mg TBph Take 1 tablet by mouth 2 (two) times a day.     hydroCHLOROthiazide (HYDRODIURIL) 12.5 MG  Tab Take 12.5 mg by mouth as needed.    meloxicam (MOBIC) 15 MG tablet TAKE 1 TABLET DAILY    potassium 99 mg Tab Take by mouth once.    azilsartan med-chlorthalidone (EDARBYCLOR) 40-25 mg Tab Take by mouth.    folic acid (FOLVITE) 400 MCG tablet 1 tablet. Every day    metoprolol (TOPROL XL) 50 MG 24 hr tablet Take 1 tablet (50 mg total) by mouth once daily. Every day    multivitamin (THERAGRAN) per tablet 1 tablet. Every day       Review of patient's allergies indicates:   Allergen Reactions    Other Itching     Tape-sensitive skin    Zofran [ondansetron hcl] Other (See Comments)     Pt reports medication causes Bradycardia.    Codeine Itching    Percodan [oxycodone hcl-oxycodone-asa] Itching    Vicodin [hydrocodone-acetaminophen] Itching       Past Medical History:   Diagnosis Date    Allergy     Asthma     Hyperlipidemia     Hypertension     Lumbago     PONV (postoperative nausea and vomiting)     Pre-diabetes     Skin sensitivity     Vitamin D deficiency      Past Surgical History:   Procedure Laterality Date     SECTION      times 1    DILATION AND CURETTAGE OF UTERUS      tonsillectomy      TONSILLECTOMY       Family History     Problem Relation (Age of Onset)    Heart disease Father    Hypertension Father, Mother    Stroke Father        Tobacco Use    Smoking status: Never Smoker    Smokeless tobacco: Never Used   Substance and Sexual Activity    Alcohol use: No    Drug use: No    Sexual activity: Not on file     Review of Systems   Constitutional: Negative.    HENT: Negative.    Eyes: Negative.    Respiratory: Negative.    Cardiovascular: Negative.    Gastrointestinal:        Anal pain   Endocrine: Negative.    Genitourinary: Negative.    Musculoskeletal: Negative.    Skin: Negative.    Allergic/Immunologic: Negative.    Neurological: Negative.    Hematological: Negative.    Psychiatric/Behavioral: Negative.      Objective:     Vital Signs (Most Recent):  Temp: 98.4 °F  (36.9 °C) (06/29/20 0534)  Pulse: 76 (06/29/20 0534)  Resp: 18 (06/29/20 0534)  BP: (!) 160/72 (06/29/20 0534)  SpO2: 98 % (06/29/20 0534) Vital Signs (24h Range):  Temp:  [98.4 °F (36.9 °C)] 98.4 °F (36.9 °C)  Pulse:  [76] 76  Resp:  [18] 18  SpO2:  [98 %] 98 %  BP: (160)/(72) 160/72     Weight: 88.5 kg (195 lb)  Body mass index is 35.67 kg/m².    Physical Exam  HENT:      Head: Normocephalic.   Eyes:      Conjunctiva/sclera: Conjunctivae normal.   Neck:      Musculoskeletal: Normal range of motion.   Cardiovascular:      Rate and Rhythm: Normal rate.   Pulmonary:      Effort: Pulmonary effort is normal.   Abdominal:      General: Abdomen is flat.      Palpations: Abdomen is soft.   Musculoskeletal: Normal range of motion.   Skin:     General: Skin is warm and dry.   Neurological:      General: No focal deficit present.      Mental Status: She is alert and oriented to person, place, and time.   Psychiatric:         Mood and Affect: Mood normal.         Behavior: Behavior normal.           Significant Labs:  BMP (Last 3 Results): No results for input(s): GLU, NA, K, CL, CO2, BUN, CREATININE, CALCIUM, MG in the last 168 hours.  CBC (Last 3 Results): No results for input(s): WBC, RBC, HGB, HCT, PLT, MCV, MCH, MCHC in the last 168 hours.    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 Will plan for lateral internal sphincterotomy, possible excision of hemorrhoid today          Sanju Keyes MD  Colorectal Surgery  [facility]

## 2020-06-29 NOTE — OP NOTE
PATRICK RUFFIN  [id number]  [id number]    OPERATIVE NOTE:    DATE OF OPERATION: 06/29/2020    PREOPERATIVE DIAGNOSIS:  Anal fissure    POSTOPERATIVE DIAGNOSIS:  Anterior midline anal fissure    PROCEDURE PERFORMED:  Limited lateral internal anal sphincterotomy    ATTENDING SURGEON: MARICRUZ Contreras MD    ASSISTING SURGEON: Stephy    ANESTHESIA:  General    ESTIMATED BLOOD LOSS:  10 mL    FINDINGS:  1.  Moderate size internal hemorrhoids.  Minimal external hemorrhoidal skin tags.  Anterior and posterior anal fissure.    SPECIMENS:  None    COMPLICATIONS:  None apparent    DISPOSITION: PACU    CONDITION: good    INDICATION FOR PROCEDURE:  Patrick Ruffin is a 54 y.o. female who presented with perianal pain and bleeding.  She was diagnosed with anal fissure.  This was refractory to conservative management.  She therefore wished to have sphincterotomy performed.    DESCRIPTION OF PROCEDURE:    After informed consent was reviewed, the patient was taken to the operating room and placed under general anesthesia.  she was placed in the prone greg-knife position.  The buttocks were taped apart and the perianal skin was prepped and draped in usual sterile fashion.  A time-out was then performed.   Digital exam demonstrated a mildly hypertonic internal anal sphincter.  Detailed anoscopy was then performed.  There is a small anterior midline anal fissure as well as small posterior midline anal fissure.  No significant underlying fistula or abscess.  She had a small amount of external hemorrhoidal skin tags.  Moderate grade 1 internal hemorrhoids.   Given that her predominant symptom was pain, limited lateral internal anal sphincterotomy was performed without hemorrhoidectomy.  In the right lateral side in the intersphincteric groove, an 11 blade was inserted and rotated to be perpendicular to the fibers of the internal anal sphincter.  The hypertonic fibers of the internal anal sphincter were divided and there was a palpable  difference in the anal tone.  The defect at the anal verge was reapproximated with a 3 0 chromic suture.   40 mL of 0.25% Marcaine was injected as a long acting local anesthetic.  The patient tolerated the procedure well.  There were no apparent complications.  Fluff dressings were applied.  she was then extubated and taken to PACU in stable condition.        MARICRUZ Contreras MD, FACS  Staff Surgeon  Colon & Rectal Surgery

## 2020-06-29 NOTE — TRANSFER OF CARE
"Anesthesia Transfer of Care Note    Patient: Felicia Ruffin    Procedure(s) Performed: Procedure(s) (LRB):  SPHINCTEROTOMY, ANAL, INTERNAL, LATERAL (N/A)    Patient location: PACU    Anesthesia Type: general    Transport from OR: Transported from OR on 6-10 L/min O2 by face mask with adequate spontaneous ventilation    Post pain: adequate analgesia    Post assessment: no apparent anesthetic complications and tolerated procedure well    Post vital signs: stable    Level of consciousness: awake, alert and oriented    Nausea/Vomiting: no nausea/vomiting    Complications: none    Transfer of care protocol was followed      Last vitals:   Visit Vitals  BP (!) 160/72 (BP Location: Right arm, Patient Position: Lying)   Pulse 76   Temp 36.9 °C (98.4 °F) (Oral)   Resp 18   Ht 5' 2" (1.575 m)   Wt 88.5 kg (195 lb)   LMP 07/01/2017   SpO2 98%   Breastfeeding No   BMI 35.67 kg/m²     "

## 2020-06-29 NOTE — TELEPHONE ENCOUNTER
----- Message from Kamryn Terrazas sent at 6/29/2020  2:58 PM CDT -----  Contact: self @ [phone]  Pt had a procedure the morning and has questions concerning her medication.  Pls call.

## 2020-06-29 NOTE — ANESTHESIA POSTPROCEDURE EVALUATION
Anesthesia Post Evaluation    Patient: Felicia Ruffin    Procedure(s) Performed: Procedure(s) (LRB):  SPHINCTEROTOMY, ANAL, INTERNAL, LATERAL (N/A)    Final Anesthesia Type: general    Patient location during evaluation: PACU  Patient participation: Yes- Able to Participate  Level of consciousness: awake and alert  Post-procedure vital signs: reviewed and stable  Pain management: adequate  Airway patency: patent    PONV status at discharge: No PONV  Anesthetic complications: no      Cardiovascular status: blood pressure returned to baseline  Respiratory status: unassisted  Hydration status: euvolemic  Follow-up not needed.          Vitals Value Taken Time   /61 06/29/20 0900   Temp 36.8 °C (98.3 °F) 06/29/20 0900   Pulse 70 06/29/20 0900   Resp 12 06/29/20 0900   SpO2 95 % 06/29/20 0900         No case tracking events are documented in the log.      Pain/Lana Score: Lana Score: 9 (6/29/2020  8:05 AM)

## 2020-06-29 NOTE — HPI
No changes since office visit in January:    Patient is a 54 y.o. female presents with anal fissure.  Fourteen year history of perianal pain with bowel movements.  Intermittently improves with taking NSAIDs.  Complains of intermittent bleeding with her bowel movements as well as intermittent tearing perianal pain. She has bowel movements daily and spends approximately 20 min on the toilet for each bowel movement.  No family history of colon rectal cancer.  No family history of inflammatory bowel disease.  No episodes of fecal incontinence. No past anorectal surgeries.     Last Colonoscopy: 01/25/2016  Diminutive polyp in the rectum cold biopsied.  Pathology demonstrated hyperplastic polyp.  Intermittent anal fissure seen.    Presents today for surgical treatment of anal fissure

## 2020-06-29 NOTE — PLAN OF CARE
Pt discharged per MD orders.  Tele discontinued and returned to station.  IV discontinued; catheter tip intact x1.  Medication list and prescriptions reviewed; [place] [facility] [place] the . Pt verbalizes understanding of all written and verbal discharge instructions.  is here to pick the patient up. Patient safely discharged from the pharmacy.

## 2020-06-29 NOTE — DISCHARGE INSTRUCTIONS
Anal Surgery Post Op Instructions:    You had a sphincterotomy performed today.  Everything went well.   The hemorrhoids were mostly internal and were unlikely related to your pain with bowel movements.  If you continue to have bleeding, we can band the internal hemorrhroids in the office.       Wound care:  Expect some drainage over the next few weeks as the wound heals.  Please wear a pad to help with drainage.     You have no activity restrictions.   No dietary restrictions.    Medications:  A narcotic pain medication has been prescribed.  Please start to wean the pain medication over the following few days.  You can take tylenol instead of the pain medication. Take gabapentin 3 times daily for pain and you may take benadryl for itching    Please take miralax 1 capful at night to prevent constipation associated with narcotic pain medications.      Follow up:  Return to clinic in 4 weeks for follow up and wound check.    MARICRUZ Contreras MD  Staff Surgeon  Colon & Rectal Surgery

## 2020-06-29 NOTE — ANESTHESIA PREPROCEDURE EVALUATION
06/29/2020  Felicia Ruffin is a 54 y.o., female.    Anesthesia Evaluation    I have reviewed the Patient Summary Reports.      I have reviewed the Medications.     Review of Systems  Anesthesia Hx:  Hx of Anesthetic complications PONV History of prior surgery of interest to airway management or planning: Previous anesthesia: General   Social:  Non-Smoker, No Alcohol Use    Hematology/Oncology:  Hematology Normal   Oncology Normal     EENT/Dental:EENT/Dental Normal   Cardiovascular:   Exercise tolerance: good Hypertension, well controlled    Pulmonary:   Asthma mild    Renal/:   Chronic Renal Disease renal calculi    Hepatic/GI:  Hepatic/GI Normal    Musculoskeletal:  Musculoskeletal Normal    Neurological:  Neurology Normal    Endocrine:  Endocrine Normal    Dermatological:  Skin Normal    Psych:  Psychiatric Normal           Physical Exam  General:  Obesity    Airway/Jaw/Neck:  Airway Findings: Mouth Opening: Normal Tongue: Normal  General Airway Assessment: Adult  Mallampati: II  TM Distance: Normal, at least 6 cm  Jaw/Neck Findings:  Neck ROM: Normal ROM      Dental:  Dental Findings: In tact        Mental Status:  Mental Status Findings:  Cooperative, Alert and Oriented         Anesthesia Plan  Type of Anesthesia, risks & benefits discussed:  Anesthesia Type:  general  Patient's Preference: GA  Intra-op Monitoring Plan: standard ASA monitors  Intra-op Monitoring Plan Comments:   Post Op Pain Control Plan: multimodal analgesia  Post Op Pain Control Plan Comments:   Induction:   IV  Beta Blocker:  Patient is on a Beta-Blocker and has received one dose within the past 24 hours (No further documentation required).       Informed Consent: Patient understands risks and agrees with Anesthesia plan.  Questions answered. Anesthesia consent signed with patient.  ASA Score: 2     Day of Surgery Review of History &  Physical:  There are no significant changes.  H&P update referred to the surgeon.         Ready For Surgery From Anesthesia Perspective.        details… detailed exam

## 2020-06-29 NOTE — ANESTHESIA PROCEDURE NOTES
Intubation  Performed by: Angelique Avila CRNA  Authorized by: Everett Mcgrath MD     Intubation:     Induction:  Intravenous    Intubated:  Postinduction    Mask Ventilation:  Easy mask    Attempts:  2    Attempted By:  CRNA    Method of Intubation:  Direct    Blade:  Cabrera 2    Laryngeal View Grade: Grade III - only epiglottis visible      Attempted By (2nd Attempt):  Staff anesthesiologist    Blade (2nd Attempt):  Larose 3    Laryngeal View Grade (2nd Attempt): Grade IIa - cords partially seen      Difficult Airway Encountered?: No      Complications:  None    Airway Device Size:  7.0    Style/Cuff Inflation:  Cuffed    Placement Verified By:  Capnometry and Colorimetric ETCO2 device    Complicating Factors:  Anterior larynx, small mouth and short neck    Findings Post-Intubation:  BS equal bilateral

## 2020-07-09 PROBLEM — D50.9 IRON DEFICIENCY ANEMIA, UNSPECIFIED: Status: ACTIVE | Noted: 2020-07-09

## 2020-07-27 NOTE — PROGRESS NOTES
"  CRS Office Post Operative Visit    SUBJECTIVE:     Chief Complaint: followup from surgery.     Procedure:   6/29/20: [facility]     Indication:  chronic anterior and posterior midline anal fissure    Last Colonoscopy: 01/25/2016  Diminutive polyp in the rectum cold biopsied.  Pathology demonstrated hyperplastic polyp.  Intermittent anal fissure seen.     Significant post operative events:  did well     Pathology: none    Current Status:  Doing well from anorectal standpoint.  Recently has been found to be anemic and is concerned regarding anemia.  She has been transfused with iron twice    Review of Systems:  Review of Systems   Constitutional: Negative for chills, diaphoresis, fever, malaise/fatigue and weight loss.   HENT: Negative for congestion.    Respiratory: Negative for shortness of breath.    Cardiovascular: Negative for chest pain and leg swelling.   Gastrointestinal: Negative for abdominal pain, blood in stool, constipation, nausea and vomiting.   Genitourinary: Negative for dysuria.   Musculoskeletal: Negative for back pain and myalgias.   Skin: Negative for rash.   Neurological: Negative for dizziness and weakness.   Endo/Heme/Allergies: Does not bruise/bleed easily.   Psychiatric/Behavioral: Negative for depression.       OBJECTIVE:     Vital Signs (Most Recent)  BP (!) 157/81 (BP Location: Right arm, Patient Position: Sitting, BP Method: Large (Automatic))   Pulse 89   Ht 5' 2" (1.575 m)   Wt 88.2 kg (194 lb 7.1 oz)   LMP 07/01/2017   BMI 35.56 kg/m²     Physical Exam:  General: White female in no distress   Respiratory: respirations are even and unlabored  Cardiac: regular rate  Abdomen:  Soft, nontender, no masses  Extremities: Warm dry and intact  Anorectal:  Deferred    ASSESSMENT/PLAN:     Felicia VINSON was seen today for post-op evaluation.    Diagnoses and all orders for this visit:    Anal fissure        54 y.o. female post op from [facility] on 6/29/20.  She is overall healed well from her sphincterotomy. "  If her repeat blood counts demonstrate persistent anemia, I will be happy to arrange colonoscopy for her    W. Dionicio Contreras MD, FACS  Staff Surgeon  Colon & Rectal Surgery

## 2020-07-28 ENCOUNTER — OFFICE VISIT (OUTPATIENT)
Dept: SURGERY | Facility: CLINIC | Age: 55
End: 2020-07-28
Payer: OTHER GOVERNMENT

## 2020-07-28 ENCOUNTER — TELEPHONE (OUTPATIENT)
Dept: SURGERY | Facility: CLINIC | Age: 55
End: 2020-07-28

## 2020-07-28 VITALS
WEIGHT: 194.44 LBS | SYSTOLIC BLOOD PRESSURE: 157 MMHG | DIASTOLIC BLOOD PRESSURE: 81 MMHG | HEIGHT: 62 IN | BODY MASS INDEX: 35.78 KG/M2 | HEART RATE: 89 BPM

## 2020-07-28 DIAGNOSIS — K60.2 ANAL FISSURE: Primary | ICD-10-CM

## 2020-07-28 PROCEDURE — 99024 PR POST-OP FOLLOW-UP VISIT: ICD-10-PCS | Mod: ,,, | Performed by: COLON & RECTAL SURGERY

## 2020-07-28 PROCEDURE — 99999 PR PBB SHADOW E&M-EST. PATIENT-LVL V: ICD-10-PCS | Mod: PBBFAC,,, | Performed by: COLON & RECTAL SURGERY

## 2020-07-28 PROCEDURE — 99215 OFFICE O/P EST HI 40 MIN: CPT | Mod: PBBFAC | Performed by: COLON & RECTAL SURGERY

## 2020-07-28 PROCEDURE — 99024 POSTOP FOLLOW-UP VISIT: CPT | Mod: ,,, | Performed by: COLON & RECTAL SURGERY

## 2020-07-28 PROCEDURE — 99999 PR PBB SHADOW E&M-EST. PATIENT-LVL V: CPT | Mod: PBBFAC,,, | Performed by: COLON & RECTAL SURGERY

## 2020-07-28 RX ORDER — METHOCARBAMOL 500 MG/1
TABLET, FILM COATED ORAL
COMMUNITY
Start: 2019-05-07 | End: 2021-11-22

## 2020-07-28 RX ORDER — ESTRADIOL/NORETHINDRONE ACETATE TRANSDERMAL SYSTEM .05; .14 MG/D; MG/D
PATCH, EXTENDED RELEASE TRANSDERMAL
COMMUNITY
Start: 2019-12-18

## 2020-07-28 RX ORDER — MUPIROCIN 20 MG/G
OINTMENT TOPICAL
COMMUNITY
Start: 2020-04-17 | End: 2021-11-22

## 2020-07-28 RX ORDER — AZILSARTAN KAMEDOXOMIL AND CHLORTHALIDONE 40; 25 MG/1; MG/1
1 TABLET ORAL NIGHTLY
COMMUNITY
Start: 2019-05-07

## 2020-07-28 RX ORDER — EMPAGLIFLOZIN, METFORMIN HYDROCHLORIDE 12.5; 1 MG/1; MG/1
TABLET, EXTENDED RELEASE ORAL
COMMUNITY
Start: 2019-11-21 | End: 2021-11-22

## 2020-07-28 RX ORDER — METOPROLOL SUCCINATE 50 MG/1
TABLET, EXTENDED RELEASE ORAL
COMMUNITY
Start: 2019-11-21 | End: 2021-11-22

## 2020-07-28 RX ORDER — HYDROCHLOROTHIAZIDE 12.5 MG/1
CAPSULE ORAL
COMMUNITY
Start: 2020-04-17 | End: 2021-11-22

## 2020-07-28 RX ORDER — BUDESONIDE AND FORMOTEROL FUMARATE DIHYDRATE 80; 4.5 UG/1; UG/1
AEROSOL RESPIRATORY (INHALATION)
COMMUNITY
Start: 2019-05-07

## 2020-07-28 RX ORDER — MELOXICAM 15 MG/1
TABLET ORAL
COMMUNITY
Start: 2019-12-13 | End: 2021-11-22

## 2020-07-28 RX ORDER — OXYCODONE AND ACETAMINOPHEN 5; 325 MG/1; MG/1
TABLET ORAL
COMMUNITY
Start: 2020-04-03 | End: 2021-11-22

## 2020-07-28 RX ORDER — EMPAGLIFLOZIN, METFORMIN HYDROCHLORIDE 12.5; 1 MG/1; MG/1
TABLET, EXTENDED RELEASE ORAL
COMMUNITY
Start: 2019-05-07 | End: 2021-11-22

## 2020-07-28 RX ORDER — NORETHINDRONE ACETATE AND ETHINYL ESTRADIOL .5; 2.5 MG/1; UG/1
TABLET ORAL
COMMUNITY
Start: 2019-12-17 | End: 2021-11-22

## 2020-07-28 RX ORDER — HYDROMORPHONE HYDROCHLORIDE 2 MG/1
TABLET ORAL
COMMUNITY
Start: 2020-06-29 | End: 2021-11-22

## 2020-07-28 RX ORDER — HYDROCHLOROTHIAZIDE 12.5 MG/1
CAPSULE ORAL
COMMUNITY
Start: 2020-05-28 | End: 2021-11-22

## 2020-07-28 RX ORDER — METOPROLOL SUCCINATE 50 MG/1
TABLET, EXTENDED RELEASE ORAL
COMMUNITY
Start: 2019-05-07 | End: 2021-11-22

## 2020-07-28 RX ORDER — AMOXICILLIN AND CLAVULANATE POTASSIUM 875; 125 MG/1; MG/1
TABLET, FILM COATED ORAL
COMMUNITY
Start: 2020-04-03 | End: 2021-11-22

## 2020-07-28 RX ORDER — EMPAGLIFLOZIN, METFORMIN HYDROCHLORIDE 12.5; 1 MG/1; MG/1
TABLET, EXTENDED RELEASE ORAL
COMMUNITY
Start: 2020-05-29 | End: 2021-11-22

## 2020-08-05 ENCOUNTER — LAB VISIT (OUTPATIENT)
Dept: LAB | Facility: HOSPITAL | Age: 55
End: 2020-08-05
Attending: INTERNAL MEDICINE
Payer: OTHER GOVERNMENT

## 2020-08-05 DIAGNOSIS — D50.9 IRON DEFICIENCY ANEMIA, UNSPECIFIED IRON DEFICIENCY ANEMIA TYPE: ICD-10-CM

## 2020-08-05 LAB
ANISOCYTOSIS BLD QL SMEAR: ABNORMAL
BASOPHILS NFR BLD: 0 % (ref 0–1.9)
DACRYOCYTES BLD QL SMEAR: ABNORMAL
DIFFERENTIAL METHOD: ABNORMAL
EOSINOPHIL NFR BLD: 0 % (ref 0–8)
ERYTHROCYTE [DISTWIDTH] IN BLOOD BY AUTOMATED COUNT: ABNORMAL % (ref 11.5–14.5)
FERRITIN SERPL-MCNC: 177 NG/ML (ref 12–264)
GIANT PLATELETS BLD QL SMEAR: PRESENT
HCT VFR BLD AUTO: 43.7 % (ref 37–48.5)
HGB BLD-MCNC: 12.5 G/DL (ref 12–16)
HYPOCHROMIA BLD QL SMEAR: ABNORMAL
IMM GRANULOCYTES # BLD AUTO: ABNORMAL K/UL (ref 0–0.04)
IMM GRANULOCYTES NFR BLD AUTO: ABNORMAL % (ref 0–0.5)
IRON SATN MFR SERPL: 29 % (ref 20–50)
IRON SERPL-MCNC: 86 UG/DL (ref 30–160)
LYMPHOCYTES NFR BLD: 24 % (ref 18–48)
MCH RBC QN AUTO: 23.7 PG (ref 27–31)
MCHC RBC AUTO-ENTMCNC: 28.6 G/DL (ref 32–36)
MCV RBC AUTO: 83 FL (ref 82–98)
MONOCYTES NFR BLD: 7 % (ref 4–15)
NEUTROPHILS # BLD AUTO: 4.6 K/UL (ref 1.8–7.7)
NEUTROPHILS NFR BLD: 69 % (ref 38–73)
NRBC BLD-RTO: 0 /100 WBC
OVALOCYTES BLD QL SMEAR: ABNORMAL
PLATELET # BLD AUTO: 405 K/UL (ref 150–350)
PLATELET BLD QL SMEAR: ABNORMAL
PMV BLD AUTO: 9.8 FL (ref 9.2–12.9)
POIKILOCYTOSIS BLD QL SMEAR: SLIGHT
POLYCHROMASIA BLD QL SMEAR: ABNORMAL
RBC # BLD AUTO: 5.28 M/UL (ref 4–5.4)
TOTAL IRON BINDING CAPACITY: 293 UG/DL (ref 265–497)
WBC # BLD AUTO: 6.69 K/UL (ref 3.9–12.7)

## 2020-08-05 PROCEDURE — 83540 ASSAY OF IRON: CPT | Mod: PN

## 2020-08-05 PROCEDURE — 82728 ASSAY OF FERRITIN: CPT | Mod: PN

## 2020-08-05 PROCEDURE — 83540 ASSAY OF IRON: CPT

## 2020-08-05 PROCEDURE — 36415 COLL VENOUS BLD VENIPUNCTURE: CPT | Mod: PN

## 2020-08-05 PROCEDURE — 85025 COMPLETE CBC W/AUTO DIFF WBC: CPT | Mod: PN

## 2020-08-05 PROCEDURE — 85025 COMPLETE CBC W/AUTO DIFF WBC: CPT

## 2020-08-05 PROCEDURE — 82728 ASSAY OF FERRITIN: CPT

## 2020-08-06 DIAGNOSIS — Z86.010 HISTORY OF COLON POLYPS: ICD-10-CM

## 2020-08-06 DIAGNOSIS — D50.9 IRON DEFICIENCY ANEMIA, UNSPECIFIED IRON DEFICIENCY ANEMIA TYPE: Primary | ICD-10-CM

## 2020-08-12 DIAGNOSIS — Z01.818 PRE-OP TESTING: ICD-10-CM

## 2020-08-12 DIAGNOSIS — Z12.11 SPECIAL SCREENING FOR MALIGNANT NEOPLASMS, COLON: Primary | ICD-10-CM

## 2020-08-12 RX ORDER — POLYETHYLENE GLYCOL 3350, SODIUM SULFATE ANHYDROUS, SODIUM BICARBONATE, SODIUM CHLORIDE, POTASSIUM CHLORIDE 236; 22.74; 6.74; 5.86; 2.97 G/4L; G/4L; G/4L; G/4L; G/4L
4 POWDER, FOR SOLUTION ORAL ONCE
Qty: 4000 ML | Refills: 0 | Status: SHIPPED | OUTPATIENT
Start: 2020-08-12 | End: 2020-08-12

## 2020-10-02 PROBLEM — R19.5 OB + STOOL: Status: ACTIVE | Noted: 2020-10-02

## 2021-04-29 ENCOUNTER — PATIENT MESSAGE (OUTPATIENT)
Dept: RESEARCH | Facility: HOSPITAL | Age: 56
End: 2021-04-29

## 2021-06-17 ENCOUNTER — OFFICE VISIT (OUTPATIENT)
Dept: CARDIOLOGY | Facility: CLINIC | Age: 56
End: 2021-06-17
Payer: OTHER GOVERNMENT

## 2021-06-17 VITALS
HEART RATE: 76 BPM | SYSTOLIC BLOOD PRESSURE: 140 MMHG | WEIGHT: 199.31 LBS | DIASTOLIC BLOOD PRESSURE: 83 MMHG | HEIGHT: 62 IN | RESPIRATION RATE: 16 BRPM | BODY MASS INDEX: 36.68 KG/M2

## 2021-06-17 DIAGNOSIS — R07.2 PRECORDIAL PAIN: ICD-10-CM

## 2021-06-17 DIAGNOSIS — R73.03 PRE-DIABETES: ICD-10-CM

## 2021-06-17 DIAGNOSIS — I10 ESSENTIAL HYPERTENSION: Primary | ICD-10-CM

## 2021-06-17 DIAGNOSIS — E78.2 MIXED HYPERLIPIDEMIA: ICD-10-CM

## 2021-06-17 PROCEDURE — 99204 OFFICE O/P NEW MOD 45 MIN: CPT | Mod: S$PBB,,, | Performed by: INTERNAL MEDICINE

## 2021-06-17 PROCEDURE — 99999 PR PBB SHADOW E&M-EST. PATIENT-LVL V: CPT | Mod: PBBFAC,,, | Performed by: INTERNAL MEDICINE

## 2021-06-17 PROCEDURE — 99215 OFFICE O/P EST HI 40 MIN: CPT | Mod: PBBFAC,PO | Performed by: INTERNAL MEDICINE

## 2021-06-17 PROCEDURE — 99999 PR PBB SHADOW E&M-EST. PATIENT-LVL V: ICD-10-PCS | Mod: PBBFAC,,, | Performed by: INTERNAL MEDICINE

## 2021-06-17 PROCEDURE — 99204 PR OFFICE/OUTPT VISIT, NEW, LEVL IV, 45-59 MIN: ICD-10-PCS | Mod: S$PBB,,, | Performed by: INTERNAL MEDICINE

## 2021-06-17 RX ORDER — SEMAGLUTIDE 1.34 MG/ML
0.5 INJECTION, SOLUTION SUBCUTANEOUS
Status: ON HOLD | COMMUNITY
End: 2022-11-08 | Stop reason: HOSPADM

## 2021-07-13 ENCOUNTER — HOSPITAL ENCOUNTER (OUTPATIENT)
Dept: CARDIOLOGY | Facility: HOSPITAL | Age: 56
Discharge: HOME | End: 2021-07-13
Attending: INTERNAL MEDICINE
Payer: OTHER GOVERNMENT

## 2021-07-13 VITALS — WEIGHT: 199 LBS | HEIGHT: 62 IN | BODY MASS INDEX: 36.62 KG/M2

## 2021-07-13 DIAGNOSIS — R73.03 PRE-DIABETES: ICD-10-CM

## 2021-07-13 DIAGNOSIS — E78.2 MIXED HYPERLIPIDEMIA: ICD-10-CM

## 2021-07-13 DIAGNOSIS — I10 ESSENTIAL HYPERTENSION: ICD-10-CM

## 2021-07-13 DIAGNOSIS — R07.2 PRECORDIAL PAIN: ICD-10-CM

## 2021-07-13 PROCEDURE — 93351 STRESS ECHO (CUPID ONLY): ICD-10-PCS | Mod: 26,,, | Performed by: INTERNAL MEDICINE

## 2021-07-13 PROCEDURE — 93351 STRESS TTE COMPLETE: CPT | Mod: 26,,, | Performed by: INTERNAL MEDICINE

## 2021-07-13 PROCEDURE — 93351 STRESS TTE COMPLETE: CPT | Mod: PO

## 2021-07-14 ENCOUNTER — PATIENT MESSAGE (OUTPATIENT)
Dept: CARDIOLOGY | Facility: CLINIC | Age: 56
End: 2021-07-14

## 2021-07-14 LAB
ASCENDING AORTA: 2.74 CM
BSA FOR ECHO PROCEDURE: 1.99 M2
CV ECHO LV RWT: 0.48 CM
CV STRESS BASE HR: 75 BPM
DIASTOLIC BLOOD PRESSURE: 70 MMHG
DOP CALC LVOT AREA: 2.8 CM2
DOP CALC LVOT DIAMETER: 1.88 CM
DOP CALC LVOT PEAK VEL: 1.09 M/S
DOP CALC LVOT STROKE VOLUME: 69.31 CM3
DOP CALCLVOT PEAK VEL VTI: 24.98 CM
E WAVE DECELERATION TIME: 198.33 MSEC
E/A RATIO: 0.89
E/E' RATIO: 10.14 M/S
ECHO LV POSTERIOR WALL: 0.96 CM (ref 0.6–1.1)
EJECTION FRACTION: 65 %
FRACTIONAL SHORTENING: 49 % (ref 28–44)
INTERVENTRICULAR SEPTUM: 0.97 CM (ref 0.6–1.1)
IVRT: 102.76 MSEC
LA MAJOR: 4.91 CM
LA MINOR: 5.59 CM
LA WIDTH: 3.77 CM
LEFT ATRIUM SIZE: 3.55 CM
LEFT ATRIUM VOLUME INDEX: 31.1 ML/M2
LEFT ATRIUM VOLUME: 59.47 CM3
LEFT INTERNAL DIMENSION IN SYSTOLE: 2.02 CM (ref 2.1–4)
LEFT VENTRICLE DIASTOLIC VOLUME INDEX: 36.51 ML/M2
LEFT VENTRICLE DIASTOLIC VOLUME: 69.74 ML
LEFT VENTRICLE MASS INDEX: 63 G/M2
LEFT VENTRICLE SYSTOLIC VOLUME INDEX: 6.8 ML/M2
LEFT VENTRICLE SYSTOLIC VOLUME: 12.98 ML
LEFT VENTRICULAR INTERNAL DIMENSION IN DIASTOLE: 3.99 CM (ref 3.5–6)
LEFT VENTRICULAR MASS: 120.37 G
LV LATERAL E/E' RATIO: 8.88 M/S
LV SEPTAL E/E' RATIO: 11.83 M/S
MV A" WAVE DURATION": 9.13 MSEC
MV PEAK A VEL: 0.8 M/S
MV PEAK E VEL: 0.71 M/S
MV STENOSIS PRESSURE HALF TIME: 57.52 MS
MV VALVE AREA P 1/2 METHOD: 3.82 CM2
OHS CV CPX 1 MINUTE RECOVERY HEART RATE: 130 BPM
OHS CV CPX 85 PERCENT MAX PREDICTED HEART RATE MALE: 134
OHS CV CPX MAX PREDICTED HEART RATE: 158
OHS CV CPX PATIENT IS FEMALE: 1
OHS CV CPX PATIENT IS MALE: 0
OHS CV CPX PEAK DIASTOLIC BLOOD PRESSURE: 59 MMHG
OHS CV CPX PEAK HEAR RATE: 141 BPM
OHS CV CPX PEAK RATE PRESSURE PRODUCT: ABNORMAL
OHS CV CPX PEAK SYSTOLIC BLOOD PRESSURE: 242 MMHG
OHS CV CPX PERCENT MAX PREDICTED HEART RATE ACHIEVED: 89
OHS CV CPX RATE PRESSURE PRODUCT PRESENTING: 8850
PULM VEIN S/D RATIO: 1.53
PV PEAK D VEL: 0.4 M/S
PV PEAK S VEL: 0.61 M/S
RA MAJOR: 3.72 CM
RA WIDTH: 3.14 CM
SINUS: 2.84 CM
STJ: 2.45 CM
SYSTOLIC BLOOD PRESSURE: 118 MMHG
TDI LATERAL: 0.08 M/S
TDI SEPTAL: 0.06 M/S
TDI: 0.07 M/S

## 2022-06-09 ENCOUNTER — TELEPHONE (OUTPATIENT)
Dept: SURGERY | Facility: CLINIC | Age: 57
End: 2022-06-09
Payer: OTHER GOVERNMENT

## 2022-07-05 ENCOUNTER — OFFICE VISIT (OUTPATIENT)
Dept: SURGERY | Facility: CLINIC | Age: 57
End: 2022-07-05
Payer: OTHER GOVERNMENT

## 2022-07-05 VITALS
HEART RATE: 86 BPM | BODY MASS INDEX: 34.02 KG/M2 | WEIGHT: 184.88 LBS | HEIGHT: 62 IN | DIASTOLIC BLOOD PRESSURE: 66 MMHG | SYSTOLIC BLOOD PRESSURE: 140 MMHG

## 2022-07-05 DIAGNOSIS — K59.04 CHRONIC IDIOPATHIC CONSTIPATION: Primary | ICD-10-CM

## 2022-07-05 PROCEDURE — 99214 OFFICE O/P EST MOD 30 MIN: CPT | Mod: PBBFAC | Performed by: COLON & RECTAL SURGERY

## 2022-07-05 PROCEDURE — 99213 OFFICE O/P EST LOW 20 MIN: CPT | Mod: S$PBB,,, | Performed by: COLON & RECTAL SURGERY

## 2022-07-05 PROCEDURE — 99213 PR OFFICE/OUTPT VISIT, EST, LEVL III, 20-29 MIN: ICD-10-PCS | Mod: S$PBB,,, | Performed by: COLON & RECTAL SURGERY

## 2022-07-05 PROCEDURE — 99999 PR PBB SHADOW E&M-EST. PATIENT-LVL IV: ICD-10-PCS | Mod: PBBFAC,,, | Performed by: COLON & RECTAL SURGERY

## 2022-07-05 PROCEDURE — 99999 PR PBB SHADOW E&M-EST. PATIENT-LVL IV: CPT | Mod: PBBFAC,,, | Performed by: COLON & RECTAL SURGERY

## 2022-07-05 RX ORDER — HYDROCORTISONE ACETATE 25 MG/1
25 SUPPOSITORY RECTAL 2 TIMES DAILY
Qty: 20 SUPPOSITORY | Refills: 2 | Status: SHIPPED | OUTPATIENT
Start: 2022-07-05 | End: 2022-07-15

## 2022-07-05 RX ORDER — HYDROCORTISONE 25 MG/G
CREAM TOPICAL 2 TIMES DAILY
Qty: 28 G | Refills: 5 | Status: SHIPPED | OUTPATIENT
Start: 2022-07-05 | End: 2022-07-15

## 2022-07-05 NOTE — PROGRESS NOTES
CRS Office Followup    SUBJECTIVE:     Chief Complaint:  Hemorrhoids    History:   She has bowel movements daily and spends approximately 20 min on the toilet for each bowel movement.  No family history of colon rectal cancer.  No family history of inflammatory bowel disease.  No episodes of fecal incontinence. No past anorectal surgeries.    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  Underwent limited sphincterotomy on 06/29/2020.    07/05/2022:  Presents for evaluation for hemorrhoidal disease.  She states that she has hemorrhoidal swelling causing difficulty with defecation.  She is having daily bowel movements and spending over 1 hour on the toilet for each bowel movement.  Main complaint is swelling and prolapse of tissue.  She is currently taking Linzess 145 mcg but only taking it once per week as a result is in significant diarrhea and frequency causing her to be unable to leave her house.  She takes MiraLax daily as well as four stool softeners daily.    10/2/2020  Impression:           - One 7 mm polyp in the sigmoid colon, removed with                         a hot biopsy forceps. Resected and retrieved.  --> hyperplastic polyp                        - Three 2 to 3 mm polyps in the sigmoid colon and                         in the transverse colon. Treated with hot biopsy                         forceps.                         - Non-bleeding external and internal hemorrhoids.                         - The examination was otherwise normal.                         - The distal rectum and anal verge are normal on                         retroflexion view.                         - Congested mucosa in the transverse colon.                         Biopsied.  --> normal pathology        Review of Systems:  Review of Systems   Constitutional: Negative for chills, diaphoresis, fever, malaise/fatigue and weight loss.   HENT: Negative for congestion.    Respiratory: Negative for  "shortness of breath.    Cardiovascular: Negative for chest pain and leg swelling.   Gastrointestinal: Positive for constipation. Negative for abdominal pain, blood in stool, nausea and vomiting.   Genitourinary: Negative for dysuria.   Musculoskeletal: Negative for back pain and myalgias.   Skin: Negative for rash.   Neurological: Negative for dizziness and weakness.   Endo/Heme/Allergies: Does not bruise/bleed easily.   Psychiatric/Behavioral: Negative for depression.       OBJECTIVE:     Vital Signs (Most Recent)  BP (!) 140/66 (BP Location: Left arm, Patient Position: Sitting, BP Method: Large (Automatic))   Pulse 86   Ht 5' 2" (1.575 m)   Wt 83.9 kg (184 lb 14.4 oz)   LMP 07/01/2017   BMI 33.82 kg/m²     Physical Exam:  General: White female in no distress   Respiratory: respirations are even and unlabored  Cardiac: regular rate  Abdomen:  Soft, nontender, no masses  Extremities: Warm dry and intact  Anorectal:  External exam with multiple perianal mixed internal and external hemorrhoids.  Digital exam performed with congested internal hemorrhoids palpated.  Tender on exam and unable to tolerate anoscopy.    Labs:  H&H 14 and 43. TSH normal.    Imaging:  No recent abdominal imaging      ASSESSMENT/PLAN:     Felicia VINSON was seen today for hemorrhoids.    Diagnoses and all orders for this visit:    Chronic idiopathic constipation    Other orders  -     linaCLOtide (LINZESS) 72 mcg Cap capsule; Take 1 capsule (72 mcg total) by mouth before breakfast.  -     hydrocortisone 2.5 % cream; Apply topically 2 (two) times daily. for 10 days  -     hydrocortisone (ANUSOL-HC) 25 mg suppository; Place 1 suppository (25 mg total) rectally 2 (two) times daily. for 10 days        56 y.o. female post op from [facility] on 6/29/20.  She continues to have difficulty with significant constipation.  It appears that the Linzess 145 mcg per day is too strong.  Recommended decreasing to Linzess 72 mcg taken 30 minutes prior to dinner so that " she can have nightly bowel movements.  Recommended stopping the MiraLax and stool softeners for now.  Steroid cream given to assist with hemorrhoidal swelling.  If she is able to have more regular bowel movements and continues to have significant difficulty with hemorrhoidal swelling, excisional hemorrhoidectomy can therefore be offered.  I would be reluctant to offer excisional hemorrhoidectomy while she continues to have significant difficulty with defecation due to the increased risk of recurrence.  She will follow-up with me through the portal for adjustment of her Idris Contreras MD, FACS  Staff Surgeon  Colon & Rectal Surgery

## 2022-10-18 PROBLEM — R13.10 DYSPHAGIA: Status: ACTIVE | Noted: 2022-10-18

## 2022-10-27 ENCOUNTER — TELEPHONE (OUTPATIENT)
Dept: SURGERY | Facility: CLINIC | Age: 57
End: 2022-10-27
Payer: OTHER GOVERNMENT

## 2022-11-02 ENCOUNTER — TELEPHONE (OUTPATIENT)
Dept: HEMATOLOGY/ONCOLOGY | Facility: CLINIC | Age: 57
End: 2022-11-02
Payer: OTHER GOVERNMENT

## 2022-11-02 ENCOUNTER — OFFICE VISIT (OUTPATIENT)
Dept: HEMATOLOGY/ONCOLOGY | Facility: CLINIC | Age: 57
End: 2022-11-02
Payer: OTHER GOVERNMENT

## 2022-11-02 VITALS
TEMPERATURE: 97 F | WEIGHT: 161.25 LBS | BODY MASS INDEX: 29.67 KG/M2 | HEART RATE: 100 BPM | SYSTOLIC BLOOD PRESSURE: 110 MMHG | DIASTOLIC BLOOD PRESSURE: 72 MMHG | RESPIRATION RATE: 18 BRPM | HEIGHT: 62 IN | OXYGEN SATURATION: 98 %

## 2022-11-02 DIAGNOSIS — C16.9 GASTRIC ADENOCARCINOMA: Primary | ICD-10-CM

## 2022-11-02 PROCEDURE — 99204 PR OFFICE/OUTPT VISIT, NEW, LEVL IV, 45-59 MIN: ICD-10-PCS | Mod: S$PBB,,, | Performed by: SURGERY

## 2022-11-02 PROCEDURE — 99204 OFFICE O/P NEW MOD 45 MIN: CPT | Mod: S$PBB,,, | Performed by: SURGERY

## 2022-11-02 PROCEDURE — 99999 PR PBB SHADOW E&M-EST. PATIENT-LVL V: CPT | Mod: PBBFAC,,, | Performed by: SURGERY

## 2022-11-02 PROCEDURE — 99999 PR PBB SHADOW E&M-EST. PATIENT-LVL V: ICD-10-PCS | Mod: PBBFAC,,, | Performed by: SURGERY

## 2022-11-02 PROCEDURE — 99215 OFFICE O/P EST HI 40 MIN: CPT | Mod: PBBFAC,PN | Performed by: SURGERY

## 2022-11-02 RX ORDER — COVID-19 VACCINE, MRNA 0.2 MG/ML
INJECTION, SUSPENSION INTRAMUSCULAR
COMMUNITY
Start: 2021-12-02

## 2022-11-02 RX ORDER — LIDOCAINE 50 MG/G
PATCH TOPICAL
COMMUNITY
Start: 2022-01-14

## 2022-11-02 RX ORDER — SEMAGLUTIDE 1.34 MG/ML
INJECTION, SOLUTION SUBCUTANEOUS
Status: ON HOLD | COMMUNITY
Start: 2022-05-06 | End: 2022-11-08 | Stop reason: HOSPADM

## 2022-11-02 RX ORDER — BENZONATATE 100 MG/1
CAPSULE ORAL
Status: ON HOLD | COMMUNITY
Start: 2022-08-08 | End: 2022-11-08 | Stop reason: HOSPADM

## 2022-11-02 RX ORDER — SEMAGLUTIDE 1.34 MG/ML
0.5 INJECTION, SOLUTION SUBCUTANEOUS
Status: ON HOLD | COMMUNITY
Start: 2022-09-09 | End: 2022-11-08 | Stop reason: HOSPADM

## 2022-11-02 RX ORDER — PANTOPRAZOLE SODIUM 40 MG/1
TABLET, DELAYED RELEASE ORAL
COMMUNITY
Start: 2022-10-19

## 2022-11-02 RX ORDER — HYDROCORTISONE 25 MG/G
CREAM TOPICAL
COMMUNITY
Start: 2022-07-05

## 2022-11-02 RX ORDER — AZITHROMYCIN 250 MG/1
TABLET, FILM COATED ORAL
COMMUNITY
Start: 2022-05-06 | End: 2023-07-11 | Stop reason: ALTCHOICE

## 2022-11-02 RX ORDER — HYDROCORTISONE ACETATE 25 MG/1
SUPPOSITORY RECTAL
COMMUNITY
Start: 2022-07-05

## 2022-11-02 RX ORDER — BENZONATATE 100 MG/1
100 CAPSULE ORAL 3 TIMES DAILY PRN
Status: ON HOLD | COMMUNITY
Start: 2022-08-08 | End: 2022-11-08 | Stop reason: HOSPADM

## 2022-11-02 NOTE — PROGRESS NOTES
Spoke to patient and caregiver. Confirmed procedure for 11/8/2022.   Informed to arrive at the [facility] on the 2nd floor on [facility] for 0600  and surgery time is 0800. RN will call  day before to confirm surgery and arrival time. Surgery Instructions provided as follows:  instructed to remain NPO solids 8 hours prior to surgery, clear liquids until 2 hours prior to surgery, to shower with hibiclens the night before surgery and morning of surgery before arrival, not to apply any lotions, powders or deodorant, remove all metal and jewelry, to wear comfortable clothes, and leave all valuables at home. Medications reviewed and  instructed to bring medications on DOS. Confirmed pt  will have transportation home . Post operative instructions reviewed. Pt provided  [facility] surgery guide,  disability instructions,  and  instructed to bring surgery folder  on DOS. Pt  and caregiver verbalized understanding to all of the above and instructed to contact us for any questions.

## 2022-11-03 ENCOUNTER — TELEPHONE (OUTPATIENT)
Dept: SURGERY | Facility: CLINIC | Age: 57
End: 2022-11-03
Payer: OTHER GOVERNMENT

## 2022-11-03 ENCOUNTER — PATIENT MESSAGE (OUTPATIENT)
Dept: SURGERY | Facility: HOSPITAL | Age: 57
End: 2022-11-03
Payer: OTHER GOVERNMENT

## 2022-11-03 NOTE — TELEPHONE ENCOUNTER
Call placed to pt to inquire about  for the [facility] and referral to genetics.  Pt reports she does not have a  and will inquire with her PCP about referral to genetics and pt will also call her insurance company to further inquire.

## 2022-11-04 NOTE — H&P (VIEW-ONLY)
Felicia is a tough case of a proximal/cardia gastric cancer diagnosed by endoscopic biopsy. She reports recent significant weight loss that she had initially attributed to Ozempic but more recently has had solid food dysphagia and anemia.  She has no family history of malignancy and is in clinic today accompanied by her sister.    DIAGNOSIS:   10/19/2022    JL/pjl   1.  GASTRIC ANTRAL BIOPSIES:   - MILD CHRONIC REACTIVE GASTRITIS.   - NO HELICOBACTER SEEN.     - NO TUMOR SEEN.     2.  GASTRIC BODY BIOPSIES:   - CHRONIC GASTRITIS.   - NO TUMOR SEEN.       3.  GASTRIC CARDIA MASS BIOPSIES:   - ADENOCARCINOMA.         Her original scans showed no liver or chest metastasis but unfortunately this morning her PET scan returned and is concerning for potential carcinomatosis with several pet avid abdominal nodules. We had a long discussion regarding the clinical concern for stage IV gastric cancer and peritoneal carcinomatosis. We discussed diagnostic laparoscopy with additional biopsies for both confirmation as well as additional tissue for NGS studies. Will place a port at the same time to facilitate anticipated systemic therapy. This is a big hit for Ms. Ruffin and we spent some time together discussing prognosis of metastatic gastric cancer. She requested and I offered oncology psych services. I have reached out to Dr. Rosa and discussed the case with her and will followup with our additional findings next week on laparoscopy.    I spent 45 minutes with Ms. Ruffin, with >50% of time spent face to face and additional time preparing to see the patient (eg, review of tests), obtaining and/or reviewing separately obtained history, documenting clinical information in the electronic or other health record, independently interpreting results (not separately reported) and communicating results to the patient/family/caregiver, or Care coordination (not separately reported).       Tee Porter MD  Upper GI / Hepatobiliary  Surgical Oncology  [facility] [place], [place]  Office: [phone]  Fax: [phone]

## 2022-11-04 NOTE — PROGRESS NOTES
Felicia is a tough case of a proximal/cardia gastric cancer diagnosed by endoscopic biopsy. She reports recent significant weight loss that she had initially attributed to Ozempic but more recently has had solid food dysphagia and anemia.  She has no family history of malignancy and is in clinic today accompanied by her sister.    DIAGNOSIS:   10/19/2022    JL/pjl   1.  GASTRIC ANTRAL BIOPSIES:   - MILD CHRONIC REACTIVE GASTRITIS.   - NO HELICOBACTER SEEN.     - NO TUMOR SEEN.     2.  GASTRIC BODY BIOPSIES:   - CHRONIC GASTRITIS.   - NO TUMOR SEEN.       3.  GASTRIC CARDIA MASS BIOPSIES:   - ADENOCARCINOMA.         Her original scans showed no liver or chest metastasis but unfortunately this morning her PET scan returned and is concerning for potential carcinomatosis with several pet avid abdominal nodules. We had a long discussion regarding the clinical concern for stage IV gastric cancer and peritoneal carcinomatosis. We discussed diagnostic laparoscopy with additional biopsies for both confirmation as well as additional tissue for NGS studies. Will place a port at the same time to facilitate anticipated systemic therapy. This is a big hit for Ms. Ruffin and we spent some time together discussing prognosis of metastatic gastric cancer. She requested and I offered oncology psych services. I have reached out to Dr. Rosa and discussed the case with her and will followup with our additional findings next week on laparoscopy.    I spent 45 minutes with Ms. Ruffin, with >50% of time spent face to face and additional time preparing to see the patient (eg, review of tests), obtaining and/or reviewing separately obtained history, documenting clinical information in the electronic or other health record, independently interpreting results (not separately reported) and communicating results to the patient/family/caregiver, or Care coordination (not separately reported).       Tee Porter MD  Upper GI / Hepatobiliary  Surgical Oncology  [facility] [place], [place]  Office: [phone]  Fax: [phone]

## 2022-11-07 ENCOUNTER — TELEPHONE (OUTPATIENT)
Dept: SURGERY | Facility: CLINIC | Age: 57
End: 2022-11-07
Payer: OTHER GOVERNMENT

## 2022-11-07 ENCOUNTER — ANESTHESIA EVENT (OUTPATIENT)
Dept: SURGERY | Facility: HOSPITAL | Age: 57
End: 2022-11-07
Payer: OTHER GOVERNMENT

## 2022-11-07 DIAGNOSIS — C16.9 GASTRIC ADENOCARCINOMA: Primary | ICD-10-CM

## 2022-11-07 RX ORDER — SODIUM CHLORIDE, SODIUM LACTATE, POTASSIUM CHLORIDE, CALCIUM CHLORIDE 600; 310; 30; 20 MG/100ML; MG/100ML; MG/100ML; MG/100ML
INJECTION, SOLUTION INTRAVENOUS CONTINUOUS
Status: CANCELLED | OUTPATIENT
Start: 2022-11-07

## 2022-11-07 NOTE — PRE-PROCEDURE INSTRUCTIONS
Preop instructions:      NPO instructions: NO solids foods,non-clear liquids including milk, milk products, creamers, gum, mints, or hard candy after midnight the night prior to your surgery or 8 hours prior to your SX start time.   We encourage a limited consumption of CLEAR LIQUIDS after midnight the night before your surgery up to 2 hrs prior to your SX start time.      Acceptable Clear Liquids are listed below:     Water, Gatorade/Powerade sports drinks, Apple juice (no pulp) Black coffee with without sugar/NO milk, cream or creamer or Jello.      If you have received specific instructions from your Surgeon/Surgeon's staff, please follow their instructions.      Showering instructions, directions, leave all valuables at home, medication instructions for PM prior & am of procedure explained. Patient stated an understanding.      Patient reports a h/o PONV   [facility]

## 2022-11-07 NOTE — ANESTHESIA PREPROCEDURE EVALUATION
[facility]  Anesthesia Pre-Operative Evaluation         Patient Name: Felicia Ruffin  YOB: 1965  MRN: [id number]    SUBJECTIVE:     Pre-operative evaluation for Procedure(s) (LRB):  LAPAROSCOPY, DIAGNOSTIC (N/A)  INSERTION, SINGLE LUMEN CATHETER WITH PORT, WITH FLUOROSCOPIC GUIDANCE (N/A)  Scheduled for 11/8/2022    HPI 11/07/2022:  Felicia Ruffin is a 56 y.o. female with hx of HTN, asthma, PONV, and gastric cancer. There is now concern for stage IV gastric cancer due to recent PET scan findings. She now presents for above procedures.      Prev airway:    Induction:  Intravenous    Intubated:  Postinduction    Mask Ventilation:  Easy mask    Attempts:  2    Attempted By:  CRNA    Method of Intubation:  Direct    Blade:  Cabrera 2    Laryngeal View Grade: Grade III - only epiglottis visible      Attempted By (2nd Attempt):  Staff anesthesiologist    Blade (2nd Attempt):  Larose 3    Laryngeal View Grade (2nd Attempt): Grade IIa - cords partially seen      Difficult Airway Encountered?: No      Oxygen/Ventilation Requirements:  On room air        Patient Active Problem List   Diagnosis    HTN (hypertension)    Asthma    Pre-diabetes    Kidney stone    Chronic lumbar pain    Hyperlipemia    Anal fissure    Iron deficiency anemia, unspecified    OB + stool    Dysphagia       Review of patient's allergies indicates:   Allergen Reactions    Other Itching     Tape-sensitive skin    Adhesive Hives and Rash     Okay to use paper tape//patient    Codeine Itching    Percodan [oxycodone hcl-oxycodone-asa] Itching    Vicodin [hydrocodone-acetaminophen] Itching    Dilaudid [hydromorphone]        Outpatient Medications:  Current Facility-Administered Medications on File Prior to Encounter   Medication Dose Route Frequency Provider Last Rate Last Admin    0.9%  NaCl infusion   Intravenous Continuous Tonia Woods NP 70 mL/hr at 06/29/20 0603 New Bag at 06/29/20 0603     mupirocin 2 % ointment   Nasal On Call Procedure Tonia Woods NP   Given at 06/29/20 0603     Current Outpatient Medications on File Prior to Encounter   Medication Sig Dispense Refill    atorvastatin (LIPITOR) 20 MG tablet TAKE 1 TABLET AT BEDTIME (Patient taking differently: Take 20 mg by mouth every evening.) 90 tablet 2    azilsartan med-chlorthalidone (EDARBYCLOR) 40-25 mg Tab Take 1 tablet by mouth nightly.      docusate sodium (COLACE) 100 MG capsule Take 200 mg by mouth 2 (two) times daily.      ibuprofen (ADVIL,MOTRIN) 600 MG tablet Take 1 tablet (600 mg total) by mouth every 6 (six) hours as needed for Pain. 20 tablet 0    vitamin D3-folic acid 5,000 unit- 1 mg Tab Take by mouth.      vitamin E 100 UNIT capsule Take by mouth once daily.      acetaminophen (TYLENOL) 500 MG tablet Take 1,000 mg by mouth every 6 to 8 hours as needed (pain).       aspirin 81 MG Chew Take 81 mg by mouth nightly.      azithromycin (Z-OPHELIA) 250 MG tablet       benzonatate (TESSALON) 100 MG capsule Take 100 mg by mouth 3 (three) times daily as needed.      benzonatate (TESSALON) 100 MG capsule       budesonide-formoterol 80-4.5 mcg (SYMBICORT) 80-4.5 mcg/actuation HFAA       cetirizine (ZYRTEC) 10 MG tablet TAKE 1 TABLET DAILY (Patient taking differently: Take 10 mg by mouth every evening.) 90 tablet 1    COVID-19 vacc,mRNA,Moderna,-PF (SPIKEVAX, PF,) 100 mcg/0.5 mL Susp       estradiol-norethindrone (COMBIPATCH) 0.05-0.14 mg/24 hr       hydrocortisone (ANUSOL-HC) 25 mg suppository       hydrocortisone 2.5 % cream       LIDOcaine (LIDODERM) 5 %       linaCLOtide (LINZESS) 145 mcg Cap capsule 145 mcg nightly.      multivitamin (THERAGRAN) per tablet Take 1 tablet by mouth once daily. Every day      omega-3 fatty acids/fish oil (FISH OIL-OMEGA-3 FATTY ACIDS) 300-1,000 mg capsule Take by mouth 2 (two) times a day.      OZEMPIC 1 mg/dose (4 mg/3 mL) Inject 0.5 mg into the skin every 7 days.       pantoprazole (PROTONIX) 40 MG tablet TAKE ONE TABLET BY MOUTH TWICE DAILY BEFORE breakfast and dinner      semaglutide (OZEMPIC) 0.25 mg or 0.5 mg(2 mg/1.5 mL) pen injector Inject 0.5 mg into the skin every 7 days.      semaglutide (OZEMPIC) 1 mg/dose (4 mg/3 mL)       zinc gluconate 50 mg tablet Take 50 mg by mouth once daily.          Current Inpatient Medications:      Past Surgical History:   Procedure Laterality Date     SECTION      times 1    COLONOSCOPY N/A 10/2/2020    Procedure: COLONOSCOPY;  Surgeon: Ortega Thao MD;  Location: [facility];  Service: Endoscopy;  Laterality: N/A;    DILATION AND CURETTAGE OF UTERUS      ESOPHAGOGASTRODUODENOSCOPY N/A 10/18/2022    Procedure: EGD (ESOPHAGOGASTRODUODENOSCOPY);  Surgeon: CLINT Montanez MD;  Location: [facility];  Service: Endoscopy;  Laterality: N/A;    LATERAL INTERNAL ANAL SPHINCTEROTOMY N/A 2020    Procedure: SPHINCTEROTOMY, ANAL, INTERNAL, LATERAL;  Surgeon: Brigido Contreras MD;  Location: [address];  Service: Colon and Rectal;  Laterality: N/A;    tonsillectomy      TONSILLECTOMY         Social History     Socioeconomic History    Marital status:    Tobacco Use    Smoking status: Never    Smokeless tobacco: Never   Substance and Sexual Activity    Alcohol use: No    Drug use: No       OBJECTIVE:     Weight:  Wt Readings from Last 1 Encounters:   22 73.1 kg (161 lb 4.3 oz)     There is no height or weight on file to calculate BMI.    Recent Blood Pressure Readings:  BP Readings from Last 3 Encounters:   22 110/72   10/18/22 128/72   22 (!) 140/66       Vital Signs Range (Last 24H):         CBC:   Lab Results   Component Value Date    WBC 5.43 10/20/2022    HGB 14.1 10/20/2022    HCT 42.7 10/20/2022    MCV 91 10/20/2022     10/20/2022       CMP:     Chemistry        Component Value Date/Time     10/20/2022 0749    K 3.5 10/20/2022 0749    CL 98 10/20/2022 0749    CO2 28  10/20/2022 0749    BUN 12 10/20/2022 0749    CREATININE 0.76 10/20/2022 0749     (H) 10/20/2022 0749        Component Value Date/Time    CALCIUM 10.1 10/20/2022 0749    ALKPHOS 58 10/20/2022 0749    AST 25 10/20/2022 0749    ALT 15 10/20/2022 0749    BILITOT 1.1 10/20/2022 0749    ESTGFRAFRICA >60 05/20/2022 0752    EGFRNONAA >60 05/20/2022 0752            INR:  Lab Results   Component Value Date    INR 1.1 07/02/2020       Diagnostic Studies:      EKG:    Results for orders placed or performed during the hospital encounter of 07/02/20   EKG 12-lead    Collection Time: 07/02/20 10:08 AM    Narrative    Test Reason : R42,    Vent. Rate : 084 BPM     Atrial Rate : 084 BPM     P-R Int : 148 ms          QRS Dur : 072 ms      QT Int : 390 ms       P-R-T Axes : 067 054 059 degrees     QTc Int : 460 ms    Normal sinus rhythm  Normal ECG      Confirmed by Shannon Taylor MD ([id number]) on 7/6/2020 12:16:02 PM    Referred By: [email or web address]   SELF           Confirmed By:Shannon Taylor MD       2D Echo:    No results found for this or any previous visit.    No results found for this or any previous visit.    No results found for this or any previous visit.      ASSESSMENT/PLAN:           Pre-op Assessment    I have reviewed the Patient Summary Reports.    I have reviewed the NPO Status.      Review of Systems  Anesthesia Hx:  Personal Hx of Anesthesia complications, Post-Operative Nausea/Vomiting, with every anesthetic, treatment not known   Social:  Non-Smoker, No Alcohol Use    Cardiovascular:   Hypertension    Pulmonary:   Asthma    Hepatic/GI:   Denies GERD. Denies Liver Disease.    Endocrine:   Denies Diabetes.    Psych:   Denies Psychiatric History.             Anesthesia Plan  Type of Anesthesia, risks & benefits discussed:    Anesthesia Type: Gen ETT  Intra-op Monitoring Plan: Standard ASA Monitors  Post Op Pain Control Plan: multimodal analgesia and IV/PO Opioids PRN  Induction:  IV  Airway Plan: Direct and  Video  Informed Consent: Informed consent signed with the Patient and all parties understand the risks and agree with anesthesia plan.  All questions answered.   ASA Score: 3  Day of Surgery Review of History & Physical: H&P Update referred to the surgeon/provider.    Ready For Surgery From Anesthesia Perspective.     .

## 2022-11-07 NOTE — TELEPHONE ENCOUNTER
Spoke to patient. Confirmed procedure for 11/8/2022.   Informed to arrive at the [facility] Surgery Center on the 2nd floor on [facility] for 1200  and surgery time is 1415.  Surgery Instructions provided as follows:  instructed to remain NPO solids 8 hours prior to surgery, clear liquids until 2 hours prior to surgery, to shower with hibiclens the night before surgery and morning of surgery before arrival, not to apply any lotions, powders or deodorant, remove all metal and jewelry, to wear comfortable clothes, and leave all valuables at home. Medications reviewed and  instructed to bring medications on DOS. Confirmed pt  will have transportation home and pt spouse and sister  will accompany pt on DOS. Post operative instructions reviewed. Pt instructed to bring surgery folder  on DOS. Pt  verbalized understanding to all of the above and instructed to contact us for any questions.

## 2022-11-08 ENCOUNTER — ANESTHESIA (OUTPATIENT)
Dept: SURGERY | Facility: HOSPITAL | Age: 57
End: 2022-11-08
Payer: OTHER GOVERNMENT

## 2022-11-08 ENCOUNTER — HOSPITAL ENCOUNTER (OUTPATIENT)
Facility: HOSPITAL | Age: 57
Discharge: HOME | End: 2022-11-08
Attending: SURGERY | Admitting: SURGERY
Payer: OTHER GOVERNMENT

## 2022-11-08 VITALS
BODY MASS INDEX: 29.66 KG/M2 | SYSTOLIC BLOOD PRESSURE: 107 MMHG | HEART RATE: 91 BPM | WEIGHT: 161.19 LBS | RESPIRATION RATE: 18 BRPM | TEMPERATURE: 98 F | HEIGHT: 62 IN | OXYGEN SATURATION: 97 % | DIASTOLIC BLOOD PRESSURE: 59 MMHG

## 2022-11-08 DIAGNOSIS — C16.9 GASTRIC ADENOCARCINOMA: Primary | ICD-10-CM

## 2022-11-08 LAB — POCT GLUCOSE: 107 MG/DL (ref 70–110)

## 2022-11-08 PROCEDURE — 25000003 PHARM REV CODE 250

## 2022-11-08 PROCEDURE — 94761 N-INVAS EAR/PLS OXIMETRY MLT: CPT

## 2022-11-08 PROCEDURE — D9220A PRA ANESTHESIA: Mod: ANES,,, | Performed by: ANESTHESIOLOGY

## 2022-11-08 PROCEDURE — 36000709 HC OR TIME LEV III EA ADD 15 MIN: Performed by: SURGERY

## 2022-11-08 PROCEDURE — 25000003 PHARM REV CODE 250: Performed by: STUDENT IN AN ORGANIZED HEALTH CARE EDUCATION/TRAINING PROGRAM

## 2022-11-08 PROCEDURE — 63600175 PHARM REV CODE 636 W HCPCS: Performed by: NURSE ANESTHETIST, CERTIFIED REGISTERED

## 2022-11-08 PROCEDURE — 36561 INSERT TUNNELED CV CATH: CPT | Mod: 51,RT,, | Performed by: SURGERY

## 2022-11-08 PROCEDURE — D9220A PRA ANESTHESIA: ICD-10-PCS | Mod: CRNA,,, | Performed by: NURSE ANESTHETIST, CERTIFIED REGISTERED

## 2022-11-08 PROCEDURE — 71000033 HC RECOVERY, INTIAL HOUR: Performed by: SURGERY

## 2022-11-08 PROCEDURE — 82962 GLUCOSE BLOOD TEST: CPT | Performed by: SURGERY

## 2022-11-08 PROCEDURE — 71000015 HC POSTOP RECOV 1ST HR: Performed by: SURGERY

## 2022-11-08 PROCEDURE — 25000003 PHARM REV CODE 250: Performed by: NURSE ANESTHETIST, CERTIFIED REGISTERED

## 2022-11-08 PROCEDURE — 00790 ANES IPER UPR ABD NOS: CPT | Performed by: SURGERY

## 2022-11-08 PROCEDURE — C1788 PORT, INDWELLING, IMP: HCPCS | Performed by: SURGERY

## 2022-11-08 PROCEDURE — 36000708 HC OR TIME LEV III 1ST 15 MIN: Performed by: SURGERY

## 2022-11-08 PROCEDURE — 37000009 HC ANESTHESIA EA ADD 15 MINS: Performed by: SURGERY

## 2022-11-08 PROCEDURE — 37000008 HC ANESTHESIA 1ST 15 MINUTES: Performed by: SURGERY

## 2022-11-08 PROCEDURE — 77001 CHG FLUOROGUIDE CNTRL VEN ACCESS,PLACE,REPLACE,REMOVE: ICD-10-PCS | Mod: 26,,, | Performed by: SURGERY

## 2022-11-08 PROCEDURE — 49329 UNLSTD LAPS PX ABD PERTM&OMN: CPT | Mod: ,,, | Performed by: SURGERY

## 2022-11-08 PROCEDURE — 77001 FLUOROGUIDE FOR VEIN DEVICE: CPT | Mod: 26,,, | Performed by: SURGERY

## 2022-11-08 PROCEDURE — 27201423 OPTIME MED/SURG SUP & DEVICES STERILE SUPPLY: Performed by: SURGERY

## 2022-11-08 PROCEDURE — 25000003 PHARM REV CODE 250: Performed by: SURGERY

## 2022-11-08 PROCEDURE — D9220A PRA ANESTHESIA: Mod: CRNA,,, | Performed by: NURSE ANESTHETIST, CERTIFIED REGISTERED

## 2022-11-08 PROCEDURE — 36561 PR INSERT TUNNELED CV CATH WITH PORT: ICD-10-PCS | Mod: 51,RT,, | Performed by: SURGERY

## 2022-11-08 PROCEDURE — 71000016 HC POSTOP RECOV ADDL HR: Performed by: SURGERY

## 2022-11-08 PROCEDURE — 63600175 PHARM REV CODE 636 W HCPCS: Performed by: SURGERY

## 2022-11-08 PROCEDURE — D9220A PRA ANESTHESIA: ICD-10-PCS | Mod: ANES,,, | Performed by: ANESTHESIOLOGY

## 2022-11-08 PROCEDURE — 49329 PR LAP EXCISION/DESCTUCTION OF ABDOMINAL TUMORS: ICD-10-PCS | Mod: ,,, | Performed by: SURGERY

## 2022-11-08 PROCEDURE — 63600175 PHARM REV CODE 636 W HCPCS: Performed by: STUDENT IN AN ORGANIZED HEALTH CARE EDUCATION/TRAINING PROGRAM

## 2022-11-08 PROCEDURE — 63600175 PHARM REV CODE 636 W HCPCS

## 2022-11-08 DEVICE — KIT POWERPORT SINGLE 8FR: Type: IMPLANTABLE DEVICE | Site: NECK | Status: FUNCTIONAL

## 2022-11-08 RX ORDER — SUCCINYLCHOLINE CHLORIDE 20 MG/ML
INJECTION INTRAMUSCULAR; INTRAVENOUS
Status: DISCONTINUED | OUTPATIENT
Start: 2022-11-08 | End: 2022-11-08

## 2022-11-08 RX ORDER — DEXAMETHASONE SODIUM PHOSPHATE 4 MG/ML
INJECTION, SOLUTION INTRA-ARTICULAR; INTRALESIONAL; INTRAMUSCULAR; INTRAVENOUS; SOFT TISSUE
Status: DISCONTINUED | OUTPATIENT
Start: 2022-11-08 | End: 2022-11-08

## 2022-11-08 RX ORDER — PHENYLEPHRINE HCL IN 0.9% NACL 1 MG/10 ML
SYRINGE (ML) INTRAVENOUS
Status: DISCONTINUED | OUTPATIENT
Start: 2022-11-08 | End: 2022-11-08

## 2022-11-08 RX ORDER — FENTANYL CITRATE 50 UG/ML
INJECTION, SOLUTION INTRAMUSCULAR; INTRAVENOUS
Status: DISCONTINUED | OUTPATIENT
Start: 2022-11-08 | End: 2022-11-08

## 2022-11-08 RX ORDER — FAMOTIDINE 10 MG/ML
INJECTION INTRAVENOUS
Status: DISCONTINUED | OUTPATIENT
Start: 2022-11-08 | End: 2022-11-08

## 2022-11-08 RX ORDER — ACETAMINOPHEN 10 MG/ML
1000 INJECTION, SOLUTION INTRAVENOUS ONCE
Status: COMPLETED | OUTPATIENT
Start: 2022-11-08 | End: 2022-11-08

## 2022-11-08 RX ORDER — DIPHENHYDRAMINE HYDROCHLORIDE 50 MG/ML
INJECTION INTRAMUSCULAR; INTRAVENOUS
Status: DISCONTINUED | OUTPATIENT
Start: 2022-11-08 | End: 2022-11-08

## 2022-11-08 RX ORDER — MIDAZOLAM HYDROCHLORIDE 1 MG/ML
INJECTION, SOLUTION INTRAMUSCULAR; INTRAVENOUS
Status: DISCONTINUED | OUTPATIENT
Start: 2022-11-08 | End: 2022-11-08

## 2022-11-08 RX ORDER — CEFAZOLIN SODIUM/WATER 2 G/20 ML
2 SYRINGE (ML) INTRAVENOUS
Status: DISCONTINUED | OUTPATIENT
Start: 2022-11-08 | End: 2022-11-08 | Stop reason: HOSPADM

## 2022-11-08 RX ORDER — KETAMINE HCL IN 0.9 % NACL 50 MG/5 ML
SYRINGE (ML) INTRAVENOUS
Status: DISCONTINUED | OUTPATIENT
Start: 2022-11-08 | End: 2022-11-08

## 2022-11-08 RX ORDER — ONDANSETRON 2 MG/ML
INJECTION INTRAMUSCULAR; INTRAVENOUS
Status: DISCONTINUED | OUTPATIENT
Start: 2022-11-08 | End: 2022-11-08

## 2022-11-08 RX ORDER — DEXMEDETOMIDINE HYDROCHLORIDE 100 UG/ML
INJECTION, SOLUTION INTRAVENOUS
Status: DISCONTINUED | OUTPATIENT
Start: 2022-11-08 | End: 2022-11-08

## 2022-11-08 RX ORDER — TRAMADOL HYDROCHLORIDE 50 MG/1
50 TABLET ORAL EVERY 4 HOURS PRN
Qty: 15 TABLET | Refills: 0 | Status: SHIPPED | OUTPATIENT
Start: 2022-11-08

## 2022-11-08 RX ORDER — IBUPROFEN 400 MG/1
400 TABLET ORAL EVERY 6 HOURS PRN
Status: ON HOLD | COMMUNITY
Start: 2022-11-08 | End: 2024-03-04 | Stop reason: HOSPADM

## 2022-11-08 RX ORDER — BUPIVACAINE HYDROCHLORIDE 2.5 MG/ML
INJECTION, SOLUTION EPIDURAL; INFILTRATION; INTRACAUDAL
Status: DISCONTINUED | OUTPATIENT
Start: 2022-11-08 | End: 2022-11-08 | Stop reason: HOSPADM

## 2022-11-08 RX ORDER — KETOROLAC TROMETHAMINE 30 MG/ML
INJECTION, SOLUTION INTRAMUSCULAR; INTRAVENOUS
Status: DISCONTINUED | OUTPATIENT
Start: 2022-11-08 | End: 2022-11-08

## 2022-11-08 RX ORDER — PROPOFOL 10 MG/ML
VIAL (ML) INTRAVENOUS
Status: DISCONTINUED | OUTPATIENT
Start: 2022-11-08 | End: 2022-11-08

## 2022-11-08 RX ORDER — HEPARIN 100 UNIT/ML
SYRINGE INTRAVENOUS
Status: DISCONTINUED | OUTPATIENT
Start: 2022-11-08 | End: 2022-11-08 | Stop reason: HOSPADM

## 2022-11-08 RX ORDER — SCOLOPAMINE TRANSDERMAL SYSTEM 1 MG/1
1 PATCH, EXTENDED RELEASE TRANSDERMAL
Status: DISCONTINUED | OUTPATIENT
Start: 2022-11-08 | End: 2022-11-08 | Stop reason: HOSPADM

## 2022-11-08 RX ORDER — SODIUM CHLORIDE, SODIUM LACTATE, POTASSIUM CHLORIDE, CALCIUM CHLORIDE 600; 310; 30; 20 MG/100ML; MG/100ML; MG/100ML; MG/100ML
INJECTION, SOLUTION INTRAVENOUS CONTINUOUS
Status: DISCONTINUED | OUTPATIENT
Start: 2022-11-08 | End: 2022-11-08 | Stop reason: HOSPADM

## 2022-11-08 RX ORDER — TRAMADOL HYDROCHLORIDE 50 MG/1
50 TABLET ORAL ONCE AS NEEDED
Status: COMPLETED | OUTPATIENT
Start: 2022-11-08 | End: 2022-11-08

## 2022-11-08 RX ORDER — PROCHLORPERAZINE EDISYLATE 5 MG/ML
5 INJECTION INTRAMUSCULAR; INTRAVENOUS EVERY 30 MIN PRN
Status: DISCONTINUED | OUTPATIENT
Start: 2022-11-08 | End: 2022-11-08 | Stop reason: HOSPADM

## 2022-11-08 RX ORDER — EPHEDRINE SULFATE 50 MG/ML
INJECTION, SOLUTION INTRAVENOUS
Status: DISCONTINUED | OUTPATIENT
Start: 2022-11-08 | End: 2022-11-08

## 2022-11-08 RX ORDER — ROCURONIUM BROMIDE 10 MG/ML
INJECTION, SOLUTION INTRAVENOUS
Status: DISCONTINUED | OUTPATIENT
Start: 2022-11-08 | End: 2022-11-08

## 2022-11-08 RX ORDER — LIDOCAINE HYDROCHLORIDE 20 MG/ML
INJECTION INTRAVENOUS
Status: DISCONTINUED | OUTPATIENT
Start: 2022-11-08 | End: 2022-11-08

## 2022-11-08 RX ORDER — FENTANYL CITRATE 50 UG/ML
25 INJECTION, SOLUTION INTRAMUSCULAR; INTRAVENOUS EVERY 5 MIN PRN
Status: DISCONTINUED | OUTPATIENT
Start: 2022-11-08 | End: 2022-11-08 | Stop reason: HOSPADM

## 2022-11-08 RX ADMIN — DEXAMETHASONE SODIUM PHOSPHATE 8 MG: 4 INJECTION, SOLUTION INTRAMUSCULAR; INTRAVENOUS at 01:11

## 2022-11-08 RX ADMIN — FENTANYL CITRATE 25 MCG: 50 INJECTION INTRAMUSCULAR; INTRAVENOUS at 04:11

## 2022-11-08 RX ADMIN — EPHEDRINE SULFATE 5 MG: 50 INJECTION INTRAVENOUS at 02:11

## 2022-11-08 RX ADMIN — Medication 100 MCG: at 02:11

## 2022-11-08 RX ADMIN — ACETAMINOPHEN 1000 MG: 10 INJECTION INTRAVENOUS at 05:11

## 2022-11-08 RX ADMIN — FENTANYL CITRATE 100 MCG: 50 INJECTION, SOLUTION INTRAMUSCULAR; INTRAVENOUS at 01:11

## 2022-11-08 RX ADMIN — ROCURONIUM BROMIDE 20 MG: 10 INJECTION INTRAVENOUS at 01:11

## 2022-11-08 RX ADMIN — TRAMADOL HYDROCHLORIDE 50 MG: 50 TABLET, COATED ORAL at 05:11

## 2022-11-08 RX ADMIN — ONDANSETRON 4 MG: 2 INJECTION INTRAMUSCULAR; INTRAVENOUS at 03:11

## 2022-11-08 RX ADMIN — EPHEDRINE SULFATE 10 MG: 50 INJECTION INTRAVENOUS at 02:11

## 2022-11-08 RX ADMIN — ROCURONIUM BROMIDE 10 MG: 10 INJECTION INTRAVENOUS at 01:11

## 2022-11-08 RX ADMIN — FAMOTIDINE 20 MG: 10 INJECTION, SOLUTION INTRAVENOUS at 01:11

## 2022-11-08 RX ADMIN — SCOPALAMINE 1 PATCH: 1 PATCH, EXTENDED RELEASE TRANSDERMAL at 12:11

## 2022-11-08 RX ADMIN — Medication 30 MG: at 01:11

## 2022-11-08 RX ADMIN — MIDAZOLAM HYDROCHLORIDE 2 MG: 1 INJECTION, SOLUTION INTRAMUSCULAR; INTRAVENOUS at 01:11

## 2022-11-08 RX ADMIN — DEXMEDETOMIDINE HYDROCHLORIDE 4 MCG: 100 INJECTION, SOLUTION INTRAVENOUS at 02:11

## 2022-11-08 RX ADMIN — DEXMEDETOMIDINE HYDROCHLORIDE 4 MCG: 100 INJECTION, SOLUTION INTRAVENOUS at 03:11

## 2022-11-08 RX ADMIN — KETOROLAC TROMETHAMINE 30 MG: 30 INJECTION, SOLUTION INTRAMUSCULAR; INTRAVENOUS at 03:11

## 2022-11-08 RX ADMIN — SUGAMMADEX 200 MG: 100 INJECTION, SOLUTION INTRAVENOUS at 03:11

## 2022-11-08 RX ADMIN — LIDOCAINE HYDROCHLORIDE 80 MG: 20 INJECTION INTRAVENOUS at 01:11

## 2022-11-08 RX ADMIN — Medication 2 G: at 01:11

## 2022-11-08 RX ADMIN — PROCHLORPERAZINE EDISYLATE 5 MG: 5 INJECTION INTRAMUSCULAR; INTRAVENOUS at 04:11

## 2022-11-08 RX ADMIN — DIPHENHYDRAMINE HYDROCHLORIDE 12.5 MG: 50 INJECTION, SOLUTION INTRAMUSCULAR; INTRAVENOUS at 02:11

## 2022-11-08 RX ADMIN — Medication 10 MG: at 02:11

## 2022-11-08 RX ADMIN — SODIUM CHLORIDE: 9 INJECTION, SOLUTION INTRAVENOUS at 01:11

## 2022-11-08 RX ADMIN — PROPOFOL 150 MG: 10 INJECTION, EMULSION INTRAVENOUS at 01:11

## 2022-11-08 RX ADMIN — ROCURONIUM BROMIDE 20 MG: 10 INJECTION INTRAVENOUS at 02:11

## 2022-11-08 RX ADMIN — SODIUM CHLORIDE, SODIUM GLUCONATE, SODIUM ACETATE, POTASSIUM CHLORIDE, MAGNESIUM CHLORIDE, SODIUM PHOSPHATE, DIBASIC, AND POTASSIUM PHOSPHATE: .53; .5; .37; .037; .03; .012; .00082 INJECTION, SOLUTION INTRAVENOUS at 01:11

## 2022-11-08 RX ADMIN — SUCCINYLCHOLINE CHLORIDE 160 MG: 20 INJECTION, SOLUTION INTRAMUSCULAR; INTRAVENOUS at 01:11

## 2022-11-08 NOTE — ANESTHESIA PROCEDURE NOTES
Intubation    Date/Time: 11/8/2022 1:30 PM  Performed by: Emani Rosario CRNA  Authorized by: Maurice Browne MD     Intubation:     Induction:  Intravenous    Intubated:  Postinduction    Mask Ventilation:  Easy with oral airway    Attempts:  1    Attempted By:  Student (GABRIEL Cabrera, [facility])    Method of Intubation:  Video laryngoscopy and bougie    Blade:  Larose 3    Laryngeal View Grade: Grade I - full view of cords      Difficult Airway Encountered?: Yes      Complications:  None    Airway Device:  Oral endotracheal tube    Airway Device Size:  7.0    Style/Cuff Inflation:  Cuffed (inflated to minimal occlusive pressure)    Inflation Amount (mL):  8    Tube secured:  22    Secured at:  The teeth    Placement Verified By:  Capnometry and Revisualization with laryngoscopy    Complicating Factors:  Anterior larynx and small mouth    Findings Post-Intubation:  BS equal bilateral and atraumatic/condition of teeth unchanged

## 2022-11-08 NOTE — TRANSFER OF CARE
"Anesthesia Transfer of Care Note    Patient: Felicia Ruffin    Procedure(s) Performed: Procedure(s) (LRB):  LAPAROSCOPY, DIAGNOSTIC (N/A)  INSERTION, SINGLE LUMEN CATHETER WITH PORT, WITH FLUOROSCOPIC GUIDANCE (Right)    Patient location: PACU    Anesthesia Type: general    Transport from OR: Transported from OR on 6-10 L/min O2 by face mask with adequate spontaneous ventilation    Post pain: adequate analgesia    Post assessment: no apparent anesthetic complications and tolerated procedure well    Post vital signs: stable    Level of consciousness: responds to stimulation    Nausea/Vomiting: no nausea/vomiting    Complications: none    Transfer of care protocol was followed      Last vitals:   Visit Vitals  BP (!) 114/59   Temp 36.7 °C (98.1 °F) (Oral)   Resp 16   Ht 5' 2" (1.575 m)   Wt 73.1 kg (161 lb 2.5 oz)   LMP 07/01/2017   SpO2 100%   Breastfeeding No   BMI 29.48 kg/m²     "

## 2022-11-08 NOTE — PROGRESS NOTES
Spoke w/ xray to make sure order was received for cxray. Rupa RT confirmed order is received and someone will come to xray as soon as available.

## 2022-11-08 NOTE — BRIEF OP NOTE
[facility] (Formerly [facility])  Brief Operative Note    Surgery Date: 11/8/2022     Surgeon(s) and Role:     * Flakita Porter MD - Primary    Assisting Surgeon: None    Pre-op Diagnosis:  Gastric adenocarcinoma [C16.9]    Post-op Diagnosis:  Post-Op Diagnosis Codes:     * Gastric adenocarcinoma [C16.9]    Procedure(s) (LRB):  LAPAROSCOPY, DIAGNOSTIC (N/A)  INSERTION, SINGLE LUMEN CATHETER WITH PORT, WITH FLUOROSCOPIC GUIDANCE (Right)    Anesthesia: General    Operative Findings:   Expected vascular anatomy for port placement  Gross peritoneal disease with excisional biopsy of omental mass on diagnostic laparoscopy    Estimated Blood Loss: 10 ml         Specimens:   Specimen (24h ago, onward)       Start     Ordered    11/08/22 1523  Specimen to Pathology, Surgery General Surgery  Once        Comments: Pre-op Diagnosis: Gastric adenocarcinoma [C16.9]Procedure(s):LAPAROSCOPY, DIAGNOSTICINSERTION, SINGLE LUMEN CATHETER WITH PORT, WITH FLUOROSCOPIC GUIDANCE Number of specimens: 1Name of specimens: 1. Omental Mass - PERMANENT (Send For Tempus)     References:    Click here for ordering Quick Tip   Question Answer Comment   Procedure Type: General Surgery    Specimen Class: Known or suspected malignancy    Which provider would you like to cc? FLAKITA PORTER    Release to patient Immediate        11/08/22 1522                      Discharge Note    OUTCOME: Patient tolerated treatment/procedure well without complication and is now ready for discharge.    DISPOSITION: Home or Self Care    FINAL DIAGNOSIS:  gastric adenocarcinoma    FOLLOWUP: In clinic    DISCHARGE INSTRUCTIONS:    Discharge Procedure Orders   Lifting restrictions   Order Comments: No lifting greater than weight of a jug of milk for four weeks.     No dressing needed   Order Comments: You have dermabond covering your incision. This purple glue will come off on its own in 10-14 days. Do not submerge yourself in water. You may shower, letting water run over  the incision. Do not scrub. Pat dry.     Notify your health care provider if you experience any of the following:  temperature >100.4     Notify your health care provider if you experience any of the following:  persistent nausea and vomiting or diarrhea     Notify your health care provider if you experience any of the following:  redness, tenderness, or signs of infection (pain, swelling, redness, odor or green/yellow discharge around incision site)     Notify your health care provider if you experience any of the following:  difficulty breathing or increased cough     Notify your health care provider if you experience any of the following:  severe persistent headache     Notify your health care provider if you experience any of the following:  worsening rash     Notify your health care provider if you experience any of the following:  persistent dizziness, light-headedness, or visual disturbances     Notify your health care provider if you experience any of the following:  increased confusion or weakness     Notify your health care provider if you experience any of the following:     Activity as tolerated   Order Comments: No lifting greater than weight of a jug of milk for four weeks.     MARICRUZ Reagan MD   General Surgery, PGY-5

## 2022-11-09 NOTE — ANESTHESIA POSTPROCEDURE EVALUATION
Anesthesia Post Evaluation    Patient: Felicia Ruffin    Procedure(s) Performed: Procedure(s) (LRB):  LAPAROSCOPY, DIAGNOSTIC (N/A)  INSERTION, SINGLE LUMEN CATHETER WITH PORT, WITH FLUOROSCOPIC GUIDANCE (Right)    Final Anesthesia Type: general      Patient location during evaluation: PACU  Patient participation: Yes- Able to Participate  Level of consciousness: awake and alert  Post-procedure vital signs: reviewed and stable  Pain management: adequate  Airway patency: patent    PONV status at discharge: No PONV  Anesthetic complications: no      Cardiovascular status: blood pressure returned to baseline  Respiratory status: unassisted  Hydration status: euvolemic  Follow-up not needed.          Vitals Value Taken Time   /59 11/08/22 1730   Temp 36.8 °C (98.3 °F) 11/08/22 1730   Pulse 91 11/08/22 1749   Resp 18 11/08/22 1730   SpO2 96 % 11/08/22 1749   Vitals shown include unvalidated device data.      Event Time   Out of Recovery 16:00:00         Pain/Lana Score: Pain Rating Prior to Med Admin: 4 (11/8/2022  5:20 PM)  Lana Score: 9 (11/8/2022  4:00 PM)

## 2022-11-10 NOTE — OP NOTE
Lino Huff - Surgery ([facility])  Surgery Department  Operative Note       Date of Procedure: 11/8/2022     Procedure: Procedure(s) (LRB):  LAPAROSCOPY, DIAGNOSTIC (N/A)  INSERTION, SINGLE LUMEN CATHETER WITH PORT, WITH FLUOROSCOPIC GUIDANCE (Right)     Surgeon(s) and Role:     * Tee Porter MD - Primary    Assisting Surgeon: None    Pre-Operative Diagnosis: Gastric adenocarcinoma [C16.9]    Post-Operative Diagnosis:   Same  Anesthesia: General    Operative Findings:   Evidence of distant intraperitoneal metastases in the omentum, abdominal wall, and bilateral diaphragm    Procedures:  Right IJ port with US and fluoro  Diagnostic laparoscopy with resection omental mass    Estimated Blood Loss (EBL):  5 mL           Indications:  Felicia Ruffin presents for the above procedures.  Risks and benefits were reviewed including bleeding, infection, damage to local structures, cardiovascular and pulmonary complications,  need for additional procedures, death, and imponderables.  She understands and gave informed consent to proceed.    Details:  The patient was transported to the operating room and satisfactory anesthesia established.  Preoperative antibiotics were administered.  The patient was placed in the supine position and extremities were padded and protected throughout. An appropriate timeout was performed.      Local anesthesia was infiltrated into the skin and an 18g needle was used to access to the right IJ vein under US guidance. A wire was fed easily and viewed to be in good position on fluoroscopy. A pocket was made inferiorly and the catheter tunneled to the wire insertion site. The port was secured with vicryl sutures into the pocket. The catheter was measured and cut. A dilator and sheath were then inserted over the wire under fluoroscopy. The dilator and wire were removed and the catheter inserted through the sheath. A final fluoro shot was performed and the catheter was viewed to be in correct position  without kinks. This image was saved in the chart. The port was aspirated easily and flushed with saline and hep locked. The incision was closed in layers with vicryl and monocryl sutures and dressed with Dermabond.  A post procedure chest film will be performed.    The abdomen is now insufflated with a Veress needle and then entered with an Optiview trocar.  There was clear evidence of peritoneal carcinomatosis in the omentum, abdominal sidewall, and right diaphragm.  Additional trocars were placed and we elected to remove a sizable mass in the omentum.  The LigaSure device is used to divide the omentum to free this mass in its placed in Endo-Catch bag and removed through the trocar site.  The specimen retrieval site is closed with a 0 Vicryl transfascial stitch and the remaining trocars were closed with Monocryl and Dermabond. The specimen is sent for permanent and [facility] NGS>    All needle, lap, and sponge counts were reported as correct. I communicated the intraoperative findings with the family following the procedure.     Implants:   Implant Name Type Inv. Item Serial No.  Lot No. LRB No. Used Action   KIT POWERPORT SINGLE 8FR - [id number]  KIT POWERPORT SINGLE 8FR  [employer]   1 Implanted       Specimens:   Specimen (24h ago, onward)      None                    Condition: Good    Disposition: PACU - hemodynamically stable.    Attestation: I was present and scrubbed for the entire procedure.

## 2022-11-14 DIAGNOSIS — C16.9 GASTRIC ADENOCARCINOMA: Primary | ICD-10-CM

## 2022-11-16 ENCOUNTER — OFFICE VISIT (OUTPATIENT)
Dept: PSYCHIATRY | Facility: CLINIC | Age: 57
End: 2022-11-16
Payer: OTHER GOVERNMENT

## 2022-11-16 DIAGNOSIS — F43.23 ADJUSTMENT DISORDER WITH MIXED ANXIETY AND DEPRESSED MOOD: Primary | ICD-10-CM

## 2022-11-16 DIAGNOSIS — C16.9 GASTRIC ADENOCARCINOMA: ICD-10-CM

## 2022-11-16 PROCEDURE — 90791 PSYCH DIAGNOSTIC EVALUATION: CPT | Mod: ,,, | Performed by: PSYCHOLOGIST

## 2022-11-16 PROCEDURE — 99211 OFF/OP EST MAY X REQ PHY/QHP: CPT | Mod: PBBFAC,PN | Performed by: PSYCHOLOGIST

## 2022-11-16 PROCEDURE — 99999 PR PBB SHADOW E&M-EST. PATIENT-LVL I: CPT | Mod: PBBFAC,,, | Performed by: PSYCHOLOGIST

## 2022-11-16 PROCEDURE — 90791 PR PSYCHIATRIC DIAGNOSTIC EVALUATION: ICD-10-PCS | Mod: ,,, | Performed by: PSYCHOLOGIST

## 2022-11-16 PROCEDURE — 99999 PR PBB SHADOW E&M-EST. PATIENT-LVL I: ICD-10-PCS | Mod: PBBFAC,,, | Performed by: PSYCHOLOGIST

## 2022-11-16 NOTE — PROGRESS NOTES
PSYCHO-ONCOLOGY INTAKE    Diagnostic Interview - CPT 21875    Date: 11/16/2022  Site: [place], [place]    Evaluation Length (direct face-to-face time):  1 hour    This includes face to face time and non-face to face time preparing to see the patient, obtaining and/or reviewing separately obtained history, documenting clinical information in the electronic or other health record, independently interpreting results and communicating results to the patient/family/caregiver, or care coordinator.     Referral Source: Tee Porter MD   Oncologist:Janelle Rosa MD   PCP: Britton Bernardo MD    Clinical status of patient: Outpatient    Felicia Ruffin, a 57 y.o. female, seen for initial evaluation visit.    Felicia Ruffin reviewed and agreed to informed consent and the limits of confidentiality.    Chief complaint/reason for encounter: adjustment to illness, depression and anxiety    Medical/Surgical History:    Patient Active Problem List   Diagnosis    HTN (hypertension)    Asthma    Pre-diabetes    Kidney stone    Chronic lumbar pain    Hyperlipemia    Anal fissure    Iron deficiency anemia, unspecified    OB + stool    Dysphagia       Health Behaviors:       ETOH Use: No        Tobacco Use: No   Illicit Drug Use:  No     Prescription Misuse:No   Caffeine: minimal   Exercise:The patient engaged in regular activity prior to illness The patient is actively working to return to baseline exercise tolerance.   Firearms:  Yes, secured and kept separate from ammunition   Advanced directives:No     Family History:   Psychiatric illness: Son w/ autism, sister w/ depression     Alcohol/Drug Abuse: No     Suicide: No      Past Psychiatric History:   Inpatient treatment: No     Outpatient treatment: No     Prior substance abuse treatment: No     Suicide Attempts: No     Psychotropic Medications:  Current: Lexapro  (not taking, feels dose is too strong)       Past: Lexapro  (approximately 10 years ago for short period of  time-adjustment to life stressors)    Current medications as per below, allergies reviewed in chart.    Current Outpatient Medications   Medication    acetaminophen (TYLENOL) 500 MG tablet    aspirin 81 MG Chew    atorvastatin (LIPITOR) 20 MG tablet    azilsartan med-chlorthalidone (EDARBYCLOR) 40-25 mg Tab    azithromycin (Z-OPHELIA) 250 MG tablet    budesonide-formoterol 80-4.5 mcg (SYMBICORT) 80-4.5 mcg/actuation HFAA    cetirizine (ZYRTEC) 10 MG tablet    COVID-19 vacc,mRNA,Moderna,-PF (SPIKEVAX, PF,) 100 mcg/0.5 mL Susp    docusate sodium (COLACE) 100 MG capsule    estradiol-norethindrone (COMBIPATCH) 0.05-0.14 mg/24 hr    hydrocortisone (ANUSOL-HC) 25 mg suppository    hydrocortisone 2.5 % cream    ibuprofen (ADVIL,MOTRIN) 400 MG tablet    ibuprofen (ADVIL,MOTRIN) 600 MG tablet    LIDOcaine (LIDODERM) 5 %    linaCLOtide (LINZESS) 145 mcg Cap capsule    multivitamin (THERAGRAN) per tablet    omega-3 fatty acids/fish oil (FISH OIL-OMEGA-3 FATTY ACIDS) 300-1,000 mg capsule    pantoprazole (PROTONIX) 40 MG tablet    traMADoL (ULTRAM) 50 mg tablet    vitamin D3-folic acid 5,000 unit- 1 mg Tab    vitamin E 100 UNIT capsule    zinc gluconate 50 mg tablet     No current facility-administered medications for this visit.     Facility-Administered Medications Ordered in Other Visits   Medication Frequency    0.9%  NaCl infusion Continuous    mupirocin 2 % ointment On Call Procedure              Social situation/Stressors: Felicia Ruffin lives with her  and son in [place], [place].  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  She has been in her job for 38 years.    Felicia Ruffin has been  19 years and has a son (Marino) w/ Asperger's.  Her partner is Prince.   The patient reports good social support but describes great difficulty in shifting from role as caregiver and allowing others to help (mother w/ recent heart surgery, lives w/ sister).  Felicia Ruffin is an active member of the  [employer].  Felicia Ruffin's hobbies include graphic design and spending time w/ her son.  Additional stressors: illness severity/progression    Strengths:Able to vocalize needs, Interpersonal relationships and supports available - family, relatives, friends, and Cultural/spiritual/[employer] and community involvement  Liabilities: Complicated medical illness and Financial strain    Current Evaluation:     Mental Status Exam: Felicia Ruffin arrived promptly for the assessment session.  The patient was fully cooperative throughout the interview and was an adequate historian   Appearance: age appropriate, professionally  dressed, well groomed  Behavior/Cooperation: friendly and cooperative  Speech: pressured  Mood: anxious, sad  Affect: mood congruent and appropriate  Thought Process: goal-directed, logical  Thought Content: normal, no suicidality, no homicidality, delusions, or paranoia;did not appear to be responding to internal stimuli during the interview.   Orientation: grossly intact  Memory: grossly intact  Attention Span/Concentration: Attends to interview without distraction; reports no difficulty  Fund of Knowledge: average  Estimate of Intelligence: above average from verbal skills and history  Cognition: grossly intact  Insight: patient has awareness of illness; fair insight into own behavior and behavior of others  Judgment: the patient's behavior is adequate to circumstances      History of present illness:    Oncology History    No history exists.         Felicia Ruffin has adjusted to illness with significant difficulty primarily through focus on alternative activities, focus on work, focus on family, and prayer (seemingly putting additional focus into aiding son w/ school work). She has engaged in appropriate information gathering.  The patient has good family/friend support but reports difficulty accepting aid from others at this time.  Her support system is coping adequately with the  diagnosis/treatment/prognosis. Illness-related psychosocial stressors include financial strain , absence from work, difficulty meeting family responsibilities, changes in ability to engage in leisure activities, and limitations on sexual interactions.  The patient has a good partnership with her [facility] treatment team. The patient reports the following barriers to cancer care:none.     NCCN Distress thermometer: No flowsheet data found.     Symptoms:   Mood: depressed mood, worthlessness/guilt, poor concentration, thoughts of death, and tearfulness;  prior depression:situational, throughout adulthood ; no SI/HI  Anxiety: Feeling nervous, anxious, or on edge, Uncontrollable worry (about illness, family dynamics, control), Excessive worry (interfering with sleep and ADLs), Difficulty relaxing, Restlessness, and Fear of unknown; no prior  Substance abuse: denied  Cognitive functioning: denied  Health behaviors: noncontributory  Sleep: broken sleep throughout lifespan, further exacerbated by illness; sleep interrupted by pain, AM caffeine and (+) sleep hygiene considerations, no use of OTC/melatonin/hypnotics/benzodiazepines          Assessment - Diagnosis - Goals:       ICD-10-CM ICD-9-CM   1. Adjustment disorder with mixed anxiety and depressed mood  F43.23 309.28   2. Gastric adenocarcinoma  C16.9 151.9         Plan:individual psychotherapy and medication management by physician    Summary and Recommendations  Fleicia Ruffin is a 57 y.o. female referred by Tee Porter MD for psychological evaluation and treatment.  Ms. Ruffin appears to be having great difficulty coping with her diagnosis and proposed treatment course.  Patient was encouraged to speak to her oncologist regarding psychotropic medication options. Mood protective strategies during cancer treatment were discussed.  She is interested in individual therapy for cancer coping skills and will follow up with me for that purpose. She  was encouraged to continue to engage in social support, her iron, and to consider a shift from role as caregiver (as feasible).    Return to clinic: 3 weeks    GOALS:   Medication management by physician  Psychotherapeutic support  Acceptance of identity change/control dynamics  Social support/spirituality               Stefan Colon Psy.D.  Clinical Psychologist  [place] License #[id number]  [place] License #[id number]

## 2022-11-18 LAB
FINAL PATHOLOGIC DIAGNOSIS: NORMAL
GROSS: NORMAL
Lab: NORMAL

## 2022-11-21 DIAGNOSIS — C16.9 GASTRIC ADENOCARCINOMA: Primary | ICD-10-CM

## 2022-12-01 LAB
DNA RANGE(S) EXAMINED NAR: NORMAL
GENE DIS ANL INTERP-IMP: POSITIVE
GENE DIS ASSESSED: NORMAL
GENE MUT TESTED BLD/T: 8.4 M/MB
MSI CA SPEC-IMP: NORMAL
REASON FOR STUDY: NORMAL
TEMPUS FUSIONADDENDUM: NORMAL
TEMPUS LCA: NORMAL
TEMPUS PORTAL: NORMAL
TEMPUS THERAPY1: NORMAL
TEMPUS THERAPYCOUNT: 1
TEMPUS TRIAL1: NORMAL
TEMPUS TRIAL2: NORMAL
TEMPUS TRIAL3: NORMAL
TEMPUS TRIALCOUNT: 3

## 2022-12-05 ENCOUNTER — OFFICE VISIT (OUTPATIENT)
Dept: PSYCHIATRY | Facility: CLINIC | Age: 57
End: 2022-12-05
Payer: OTHER GOVERNMENT

## 2022-12-05 DIAGNOSIS — F43.23 ADJUSTMENT DISORDER WITH MIXED ANXIETY AND DEPRESSED MOOD: Primary | ICD-10-CM

## 2022-12-05 DIAGNOSIS — C16.9 GASTRIC ADENOCARCINOMA: ICD-10-CM

## 2022-12-05 PROCEDURE — 90837 PR PSYCHOTHERAPY W/PATIENT, 60 MIN: ICD-10-PCS | Mod: ,,, | Performed by: PSYCHOLOGIST

## 2022-12-05 PROCEDURE — 90837 PSYTX W PT 60 MINUTES: CPT | Mod: ,,, | Performed by: PSYCHOLOGIST

## 2022-12-05 NOTE — PROGRESS NOTES
PSYCHO-ONCOLOGY NOTE/ Individual Psychotherapy     Date: 12/5/2022   Site:  [facility]      Therapeutic Intervention: Met with patient.  Outpatient - Insight oriented psychotherapy 60 min - CPT code 37094, Outpatient - Behavior modifying psychotherapy 60 min - CPT code 76279, and Outpatient - Supportive psychotherapy 60 min - CPT Code 27435    This includes face to face time and non-face to face time preparing to see the patient, obtaining and/or reviewing separately obtained history, documenting clinical information in the electronic or other health record, independently interpreting results and communicating results to the patient/family/caregiver, or care coordinator.      Patient was last seen by me on 11/16/2022    Problem list  Patient Active Problem List   Diagnosis    HTN (hypertension)    Asthma    Pre-diabetes    Kidney stone    Chronic lumbar pain    Hyperlipemia    Anal fissure    Iron deficiency anemia, unspecified    OB + stool    Dysphagia       Chief complaint/reason for encounter: depression, anxiety, and interpersonal   Met with patient to evaluate psychosocial adaptation to diagnosis/treatment of stage IV gastric adenocarcinoma    Current Medications  Current Outpatient Medications   Medication    acetaminophen (TYLENOL) 500 MG tablet    aspirin 81 MG Chew    atorvastatin (LIPITOR) 20 MG tablet    azilsartan med-chlorthalidone (EDARBYCLOR) 40-25 mg Tab    azithromycin (Z-OPHELIA) 250 MG tablet    budesonide-formoterol 80-4.5 mcg (SYMBICORT) 80-4.5 mcg/actuation HFAA    cetirizine (ZYRTEC) 10 MG tablet    COVID-19 vacc,mRNA,Moderna,-PF (SPIKEVAX, PF,) 100 mcg/0.5 mL Susp    docusate sodium (COLACE) 100 MG capsule    estradiol-norethindrone (COMBIPATCH) 0.05-0.14 mg/24 hr    hydrocortisone (ANUSOL-HC) 25 mg suppository    hydrocortisone 2.5 % cream    ibuprofen (ADVIL,MOTRIN) 400 MG tablet    ibuprofen (ADVIL,MOTRIN) 600 MG tablet    LIDOcaine (LIDODERM) 5 %    linaCLOtide (LINZESS) 145 mcg Cap  capsule    multivitamin (THERAGRAN) per tablet    omega-3 fatty acids/fish oil (FISH OIL-OMEGA-3 FATTY ACIDS) 300-1,000 mg capsule    pantoprazole (PROTONIX) 40 MG tablet    traMADoL (ULTRAM) 50 mg tablet    vitamin D3-folic acid 5,000 unit- 1 mg Tab    vitamin E 100 UNIT capsule    zinc gluconate 50 mg tablet     No current facility-administered medications for this visit.     Facility-Administered Medications Ordered in Other Visits   Medication Frequency    0.9%  NaCl infusion Continuous    mupirocin 2 % ointment On Call Procedure       ONCOLOGY HISTORY  Oncology History    No history exists.       Objective:  Felicia Ruffin arrived promptly for the session. Ms. Ruffin was independently ambulatory at the time of session. The patient was fully cooperative throughout the session.  Appearance: age appropriate, casually  dressed, well groomed  Behavior/Cooperation: friendly and cooperative  Speech: pressured  Mood: anxious  Affect: dysthymic, tearful at times  Thought Process: goal-directed, logical  Thought Content: normal,  No delusions or paranoia; did not appear to be responding to internal stimuli during the session  Orientation: grossly intact  Memory: grossly intact  Attention Span/Concentration: Attends to session without distraction; reports no difficulty  Fund of Knowledge: average  Estimate of Intelligence: above average from verbal skills and history  Cognition: grossly intact  Insight: patient has awareness of illness; good insight into own behavior and behavior of others  Judgment: the patient's behavior is adequate to circumstances    NCCN Distress thermometer: No flowsheet data found.     Interval history and content of current session: Discussed treatment and family's adaptation to disease and treatment status. Reports to be coping with moderate difficulty, noting continued beliefs associated w/ illness and longevity, in the context of her family's wellbeing. Notes plans for scheduled visit to [facility]  in January 2023 (post-imaging). Evaluated cognitive response, paying particular attention to negative intrusive thoughts of a persistent and detrimental nature. Thoughts of this type are in evidence with moderate/high distress and are associated w/ negotiating new role as pt and contributing to son/ wellbeing and tx related fatigue. Provided cognitive behavioral therapy to address negative cognitions, providing additional psychoeducation on time based pacing and encouraging its use. Identified and evaluated psychosocial and environmental stressors secondary to diagnosis and treatment.  Examined proactive behaviors that may be implemented to minimize or ameliorate psychosocial stressors secondary to diagnosis and treatment.    *noted that Lexapro prescription was changed to Vistaril by oncologist to aid w/ sleep     Risk parameters:   Patient reports no suicidal ideation  Patient reports no homicidal ideation  Patient reports no self-injurious behavior  Patient reports no violent behavior   Safety needs:  None at this time      Verbal deficits: None     Patient's response to intervention:The patient's response to intervention is accepting.     Progress toward goals and other mental status changes:  The patient's progress toward goals is limited.      Progress to date:Progress - Ongoing, but Slow      Goals from last visit: Attempted, partially met        Patient Strengths: verbal, intelligent, successful, good social support, good insight, commitment to wellness, strong iron, strong cultural traditions      Treatment Plan:individual psychotherapy and medication management by physician  Target symptoms: depression, anxiety , adjustment  Why chosen therapy is appropriate versus another modality: relevant to diagnosis, patient responds to this modality, evidence based practice  Outcome monitoring methods: self-report, observation, tx team feedback  Therapeutic intervention type: insight oriented psychotherapy,  behavior modifying psychotherapy, supportive psychotherapy  Prognosis: Fair      Behavioral goals:    Social engagement: continued, adaptive boundaries regarding control behaviors   Therapy: CBT, time based pacing, negotiation of new role as caregivee    Return to clinic: 3 weeks     Length of Service (minutes direct face-to-face contact): 60    Diagnosis:     ICD-10-CM ICD-9-CM   1. Adjustment disorder with mixed anxiety and depressed mood  F43.23 309.28   2. Gastric adenocarcinoma  C16.9 151.9                Stefan Colon Psy.D.  [place] License #[id number]  [place] License #[id number]

## 2022-12-07 ENCOUNTER — TELEPHONE (OUTPATIENT)
Dept: SURGERY | Facility: CLINIC | Age: 57
End: 2022-12-07
Payer: OTHER GOVERNMENT

## 2022-12-14 LAB
DNA RANGE(S) EXAMINED NAR: NORMAL
GENE DIS ANL INTERP-IMP: POSITIVE
GENE DIS ASSESSED: NORMAL
MSI CA SPEC-IMP: NOT DETECTED
REASON FOR STUDY: NORMAL
TEMPUS LCA: NORMAL
TEMPUS PORTAL: NORMAL

## 2022-12-30 ENCOUNTER — PATIENT MESSAGE (OUTPATIENT)
Dept: HEMATOLOGY/ONCOLOGY | Facility: CLINIC | Age: 57
End: 2022-12-30
Payer: OTHER GOVERNMENT

## 2023-01-03 ENCOUNTER — OFFICE VISIT (OUTPATIENT)
Dept: PSYCHIATRY | Facility: CLINIC | Age: 58
End: 2023-01-03
Payer: OTHER GOVERNMENT

## 2023-01-03 DIAGNOSIS — C16.9 GASTRIC ADENOCARCINOMA: ICD-10-CM

## 2023-01-03 DIAGNOSIS — K59.04 CHRONIC IDIOPATHIC CONSTIPATION: Primary | ICD-10-CM

## 2023-01-03 DIAGNOSIS — F43.23 ADJUSTMENT DISORDER WITH MIXED ANXIETY AND DEPRESSED MOOD: Primary | ICD-10-CM

## 2023-01-03 PROCEDURE — 90837 PSYTX W PT 60 MINUTES: CPT | Mod: ,,, | Performed by: PSYCHOLOGIST

## 2023-01-03 PROCEDURE — 90837 PR PSYCHOTHERAPY W/PATIENT, 60 MIN: ICD-10-PCS | Mod: ,,, | Performed by: PSYCHOLOGIST

## 2023-01-03 NOTE — PROGRESS NOTES
PSYCHO-ONCOLOGY NOTE/ Individual Psychotherapy     Date: 1/3/2023   Site:  [facility]      Therapeutic Intervention: Met with patient.  Outpatient - Insight oriented psychotherapy 60 min - CPT code 66531 and Outpatient - Supportive psychotherapy 60 min - CPT Code 38012    This includes face to face time and non-face to face time preparing to see the patient, obtaining and/or reviewing separately obtained history, documenting clinical information in the electronic or other health record, independently interpreting results and communicating results to the patient/family/caregiver, or care coordinator.      Patient was last seen by me on 12/5/2022    Problem list  Patient Active Problem List   Diagnosis    HTN (hypertension)    Asthma    Pre-diabetes    Kidney stone    Chronic lumbar pain    Hyperlipemia    Anal fissure    Iron deficiency anemia, unspecified    OB + stool    Dysphagia       Chief complaint/reason for encounter: depression, anxiety, and interpersonal     Met with patient to evaluate psychosocial adaptation to diagnosis/treatment of stage IV gastric adenocarcinoma    Current Medications  Current Outpatient Medications   Medication    acetaminophen (TYLENOL) 500 MG tablet    aspirin 81 MG Chew    atorvastatin (LIPITOR) 20 MG tablet    azilsartan med-chlorthalidone (EDARBYCLOR) 40-25 mg Tab    azithromycin (Z-OPHELIA) 250 MG tablet    budesonide-formoterol 80-4.5 mcg (SYMBICORT) 80-4.5 mcg/actuation HFAA    cetirizine (ZYRTEC) 10 MG tablet    COVID-19 vacc,mRNA,Moderna,-PF (SPIKEVAX, PF,) 100 mcg/0.5 mL Susp    docusate sodium (COLACE) 100 MG capsule    estradiol-norethindrone (COMBIPATCH) 0.05-0.14 mg/24 hr    hydrocortisone (ANUSOL-HC) 25 mg suppository    hydrocortisone 2.5 % cream    ibuprofen (ADVIL,MOTRIN) 400 MG tablet    ibuprofen (ADVIL,MOTRIN) 600 MG tablet    LIDOcaine (LIDODERM) 5 %    linaCLOtide (LINZESS) 145 mcg Cap capsule    multivitamin (THERAGRAN) per tablet    omega-3 fatty acids/fish  oil (FISH OIL-OMEGA-3 FATTY ACIDS) 300-1,000 mg capsule    pantoprazole (PROTONIX) 40 MG tablet    traMADoL (ULTRAM) 50 mg tablet    vitamin D3-folic acid 5,000 unit- 1 mg Tab    vitamin E 100 UNIT capsule    zinc gluconate 50 mg tablet     No current facility-administered medications for this visit.     Facility-Administered Medications Ordered in Other Visits   Medication Frequency    0.9%  NaCl infusion Continuous    mupirocin 2 % ointment On Call Procedure       ONCOLOGY HISTORY  Oncology History    No history exists.       Objective:  Felicia Ruffin arrived early for the session. Ms. Ruffin was independently ambulatory at the time of session. The patient was fully cooperative throughout the session and appeared to be in improved spirits.  Appearance: age appropriate, casually  dressed, well groomed  Behavior/Cooperation: friendly and cooperative  Speech: normal in rate, volume, and tone and appropriate quality, quantity and organization of sentences  Mood: anxious  Affect: mood congruent  Thought Process: goal-directed, logical  Thought Content: normal,  No delusions or paranoia; did not appear to be responding to internal stimuli during the session  Orientation: grossly intact  Memory: grossly intact  Attention Span/Concentration: Attends to session without distraction; reports no difficulty  Fund of Knowledge: average  Estimate of Intelligence: above average from verbal skills and history  Cognition: grossly intact  Insight: patient has awareness of illness; good insight into own behavior and behavior of others  Judgment: the patient's behavior is adequate to circumstances    NCCN Distress thermometer: No flowsheet data found.     Interval history and content of current session: Discussed current adaptation to disease and treatment status. Reports to be coping with moderate difficulty, noting that she feels limited acceptance of illness secondary to SE being limited to GI and fatigue. Provided psychoeducation  on illness. Evaluated cognitive response, paying particular attention to negative intrusive thoughts of a persistent and detrimental nature. Thoughts of this type are in evidence with moderate distress. Provided cognitive behavioral therapy to address negative cognitions and additional psychotherapeutic support. Further explored negative beliefs on limited ability to schedule/anticipate change w/ prior strategies-encouraging problem solving (i.e. GI FU and asking boss about his [facility]). Identified and evaluated psychosocial and environmental stressors secondary to diagnosis and treatment.  Examined proactive behaviors that may be implemented to minimize or ameliorate psychosocial stressors secondary to diagnosis and treatment.      Risk parameters:   Patient reports no suicidal ideation  Patient reports no homicidal ideation  Patient reports no self-injurious behavior  Patient reports no violent behavior   Safety needs:  None at this time      Verbal deficits: None     Patient's response to intervention:The patient's response to intervention is accepting, motivated.     Progress toward goals and other mental status changes:  The patient's progress toward goals is good.      Progress to date:Progress - Ongoing, but Slow      Goals from last visit: Met        Patient Strengths: verbal, intelligent, successful, good social support, good insight, commitment to wellness, strong iron, strong cultural traditions        Treatment Plan:individual psychotherapy and medication management by physician  Target symptoms: depression, anxiety , adjustment  Why chosen therapy is appropriate versus another modality: relevant to diagnosis, patient responds to this modality, evidence based practice  Outcome monitoring methods: self-report, observation, tx team feedback  Therapeutic intervention type: insight oriented psychotherapy, behavior modifying psychotherapy, supportive psychotherapy  Prognosis: Fair                             Behavioral goals:               Social engagement: continued, adaptive boundaries regarding control behaviors, interpersonal boundaries              Therapy: CBT, time based pacing, negotiation of new role as caregivee, adaptation to scheduling strategies    Return to clinic: 1 month     Length of Service (minutes direct face-to-face contact): 60    Diagnosis:     ICD-10-CM ICD-9-CM   1. Adjustment disorder with mixed anxiety and depressed mood  F43.23 309.28   2. Gastric adenocarcinoma  C16.9 151.9                Stefan Colon Psy.D.  [place] License #[id number]  [place] License #[id number]

## 2023-01-03 NOTE — PROGRESS NOTES
"Cancer Genetics  Hereditary and High-Risk Clinic  Department of Hematology and Oncology  [facility]    [facility]    Date of Service:  23  Visit Provider:  Griffin Gallo DNP  Collaborating Physician:  Pauline Whelan MD    Patient ID  Name: Felicia Ruffin    : 1965    MRN: [id number]      Referring Provider  Tee Porter MD  [address] [place] [address]    SUBJECTIVE      Chief Complaint: Genetic Evaluation    History of Present Illness (HPI):  Felicia Ruffin ("Felicia"), 57 y.o., assigned female sex at birth, is established with the [facility] but new to me.  She was referred by Dr. Tee Porter with [facility] for cancer genetic risk assessment given her diagnosis of gastric adenocarcinoma.    Focused Medical History  Germline genetic testing:  No  Cancer:  Yes  Gastric adenocarcinoma  A tumor was found in the gastric cardia on 10/18/2022 upper GI endoscopy and was biopsied, with final pathologic diagnosis of adenocarcinoma.  There was metastatic involvement of the omentum that was surgically excised by Dr. Porter on 2022.  Currently undergoing chemotherapy (Med/Onc is Dr. Janelle Rosa).  PET scan scheduled for tomorrow - Seeing Dr. Rosa on Friday for those results.  Summary of tumor genomics pertinent to this visit:  Immunohistochemistry (IHC) testing for mismatch repair (MMR) proteins:  MLH1:  Intact nuclear expression   PMS2:  Intact nuclear expression  MSH2:  Intact nuclear expression   MSH6:  Intact nuclear expression   HER2 analysis by FISH:  Negative  Tumor NGS (Specimen:  Tumor tissue):  VUS:  ATP7B c.4295C>T / p.D0121L (VAF 47.1%)   VUS:  SETBP1 c.583A>C / p.T195P (VAF 41.1%)   VUS:  VSIR c.694C>T / p.R232W (VAF 43.9%)   Tempus DNA testing (Specimen:  Peripheral blood):  Microsatellite stability:  MSI-H not detected  Colon polyp:  Yes  One colonoscopy prior to :  There were polyps - "I think he " "said two"  10/02/2020 colonoscopy, age 54y:  One (1) 7-mm sessile polyp in the sigmoid colon, removed, with final pathologic diagnosis of hyperplastic polyp  Three (3) 2- to 3-mm sessile polyps in the sigmoid colon and the transverse colon, treated with hot-biopsy forceps  Other benign tumor/mass:  Yes  Uterine fibroid - Being monitored  Pancreatitis or pancreatic cyst:  No  Skin problem:  Allergy to suntan lotion (hives, fever) - Thinks allergic to cocoa bean present in the cocoa butter ingredient; Denies experiencing skin symptoms related to the sun with non-prolonged sun exposure  Eye problem:  Nothing remarkable, wears glasses  Blood disorder:  No    Focused Surgical History  Reproductive organs intact    Tobacco Use  Never smoker    Ancestry  Ashkenazi Religious:  No  Serbian, Cajun Australian  Consanguinity:  Yes - mother's parents were cousins (not sure whether first cousins)    Family Oncologic History  ** The pedigree below was placed into this note in a size that produced a legible font.  If it is appearing small/illegible on your screen, expand this note window horizontally. **    Age of breast cancer diagnosis in maternal aunt noted about was 90 y.  Hereditary cancer genetic testing in blood relatives:  None known  Other than noted, no known history of cancer in relatives depicted in the pedigree or in:  maternal first cousins, maternal extended family, paternal first cousins, paternal extended family.  Other than noted, no known family history of benign tumors or colon polyps.  Limited knowledge of medical history of:  paternal extended family.    Review of Systems  See HPI.    Patient's Distress Score today was 7/10 (0-10 scale where 10=worst).  Patient attributes this to health and has 18yo son with Asperger's syndrome.  Patient denies experiencing suicidal or homicidal ideations (SI/HI).  She is under the care of [facility] provider Dr. Colon.  Patient clarifies that her 2/10 (0-10 scale where " 10=worst) is located in the general abdomen (anterior, lateral).  She noted she has problems with her bowel movements and is prescribed Linzess for this.  She takes the Linzess when she experiences gas, which she had yesterday.  She thinks the pain she has at present may be gas-related.  Patient is more concerned about WHY she has pain rather than about the pain itself, she states.  Encouraged patient to follow up with Dr. Rosa (her oncologist) and her GI provider regarding this.  Also advised patient that her palliative care provider (Dr. Restrepo) may be able to assist with this (she has an appointment with him on 1/10/23).     OBJECTIVE      Past Medical History:   Diagnosis Date    Allergy     Asthma     Hyperlipidemia     Hypertension     Lumbago     PONV (postoperative nausea and vomiting)     Pre-diabetes     Skin sensitivity     Vitamin D deficiency      Patient Active Problem List    Diagnosis Date Noted    Dysphagia 10/18/2022    OB + stool 10/02/2020    Iron deficiency anemia, unspecified 07/09/2020    Anal fissure 06/29/2020    HTN (hypertension) 10/15/2012    Asthma 10/15/2012    Pre-diabetes 10/15/2012    Kidney stone 10/15/2012    Chronic lumbar pain 10/15/2012    Hyperlipemia 10/15/2012      Physical Exam  Very pleasant patient.  Accompanied by her sister, Maria Del Carmen, who is also very pleasant.  Vitals signs:  Reviewed:  Vitals:    01/04/23 0840   PainSc:   2   PainLoc: Hip   Constitutional      Appearance:  Appears well developed and well nourished. No distress.   Pulmonary     Effort:  Normal.  Neurological     Mental Status:  Alert and oriented.     Coordination:  Normal.   Psychiatric         Mood and Affect:  Normal.     Thought Content:  Normal.     Speech:  Normal.     Behavior:  Normal.     Judgment:  Normal.  Genetics-specific     It is my assessment that the patient is ready to proceed with cancer genetic testing from a psychosocial perspective.    Gene Variant  Review    NM_000053.4(ATP7B):c.4295C>T (p.Xum8319Aal)  Interpretation:  Uncertain significance  REFERENCE:  National Center for Biotechnology Information. ClinVar; [HLB141690841.1], https://www.ncbi.nlm.nih.gov/clinvar/variation/TVV580489025.1 (accessed Jan. 4, 2023).    NM_015559.3(SETBP1):c.583A>C (p.Lad328Ntg)  Interpretation:  Uncertain significance  REFERENCE:  National Center for Biotechnology Information. ClinVar; [MKG141782682.1], https://www.ncbi.nlm.nih.gov/clinvar/variation/ZSR891806333.1 (accessed Jan. 4, 2023).    NM_022153.2(VSIR):c.694C>T (p.Tqz205Tpo)  Interpretation:  Uncertain significance  REFERENCE:  National Center for Biotechnology Information. ClinVar; [TSS330234094.3], https://www.ncbi.nlm.nih.gov/clinvar/variation/PAX556009938.3 (accessed Jan. 4, 2023).    CANCER GENETIC COUNSELING      Cancer Genetics     Germline cancer genetic testing is the testing of genes associated with cancer, known as cancer susceptibility genes.  Just as these genes are inherited from parents--one copy of each gene from each parent--mutations in these genes can be inherited, as well.  A mutation in a cancer susceptibility gene adversely affects the gene's ability to prevent cancer; therefore, carriers of cancer susceptibility gene mutations may be at increased risk for certain cancers.     Causes of Cancer    Only approximately 5%-10% of cancers are caused by an inherited cancer susceptibility gene mutation; rather, the majority of cancers are sporadic.  Causes of sporadic cancers may include environmental risk factors, lifestyle risk factors, and non-modifiable risk factors.  It is important to note that members of a family often share not only their genetics but also other risk factors, including environmental and lifestyle risk factors, so cancers can be familial.     Potential Results of Genetic Testing, and Their Implications     Potential results of genetic testing include positive, negative, and variant of  unknown significance (VUS).    Positive:  A positive result indicates the presence of at least one clinically significant gene mutation, and the individual's associated cancer risks vary depending upon the cancer susceptibility gene(s) in which there is/are a mutation(s).  With a positive result, depending upon the specific result and the individual's clinical history, modified risk management may be recommended, including measures for risk reduction and/or surveillance; however, there are not always effective strategies for modified risk management.  Negative:  A negative result indicates that no clinically significant mutations were identified in the gene(s) tested.    VUS:  A VUS indicates that there is not presently enough data for the laboratory to make a determination as to whether the genetic variant is clinically significant.  VUSs are not typically acted upon clinically.       Genetic Mutation Inheritance     When an individual tests positive for a gene mutation, her first-degree relatives each have a 50% chance of carrying the same mutation, and other, more distant blood relatives can also be at risk of carrying the same mutation.       Genetic Discrimination     Genetic discrimination occurs when individuals are discriminated against on the basis of their genetic information.    The Genetic Information Nondiscrimination Act of 2008 (JOSEPH) is [place]. federal legislation that provides some protections against use of an individual's genetic information by their health insurer and by their employer.      Title I of JOSEPH prohibits most health insurers from utilizing an individual's genetic information to make decisions regarding insurance eligibility or premium charges.  This protection does not apply to health insurance obtained through a job with the , and it is unclear whether it applies to health insurance obtained through the Federal Employees Health Benefits Plan.    Title II of JOSEPH prohibits  "covered entities, in many cases, from requesting or requiring the genetic information of employees and applicants and from using said information to make employment decisions.  This protection does not apply to employers with fewer than 15 employees or to the .    JOSEPH does not protect individuals from genetic discrimination by any other type of policy or entity, including but not limited to life insurance, disability insurance, long-term care insurance,  benefits, and  Health Services benefits.    Genetic Testing Logistics     An outside laboratory would perform the testing after a blood sample is collected here at the [facility] or a saliva sample is collected by the patient at home.      There is a potential for the patient to incur out-of-pocket costs related to genetic testing.    One can expect this genetic testing to take approximately three weeks to result.    Post-test genetic counseling can be conducted once the genetic testing results are available.     Cancer Genetics Impression    From a clinical standpoint, I recommend hereditary cancer susceptibility gene testing for Felicia.  Offered Felicia options of proceeding with hereditary cancer susceptibility gene testing at this time versus delaying/declining such.  Felicia elects to defer genetic testing for hereditary cancer syndromes at this time and was advised to notify me if she desires to proceed in the future; she has my contact information.     ASSESSMENT / PLAN        ICD-10-CM ICD-9-CM   1. Encounter for nonprocreative genetic counseling  Z71.83 V26.33   2. Gastric adenocarcinoma  C16.9 151.9   3. History of colon polyps  Z86.010 V12.72   4. Family history of prostate cancer  Z80.42 V16.42   5. Family history of breast cancer  Z80.3 V16.3     1. Encounter for nonprocreative genetic counseling  Felicia Ruffin ("Felicia"), 57 y.o., presented today for cancer genetic risk assessment given her diagnosis of gastric adenocarcinoma.  " Cancer genetic risk assessment and pre-test cancer genetic counseling were conducted.  From a clinical standpoint, I recommend hereditary cancer susceptibility gene testing for Felicia.  Offered Felicia options of proceeding with hereditary cancer susceptibility gene testing at this time versus delaying/declining such.  Felicia elects to defer genetic testing for hereditary cancer syndromes at this time and was advised to notify me if she desires to proceed in the future; she has my contact information.       2. Gastric adenocarcinoma  - Ambulatory referral/consult to Genetics:  COMPLETED 01/04/2023  - See #1    3. History of colon polyps  - See #1    4. Family history of prostate cancer  - See #1    5. Family history of breast cancer  - See #1     Follow-up:  Follow up in about 6 months (around 7/4/2023) for re-discussion regarding genetic testing, if the patient does not reach out sooner to test.    Questions were encouraged and answered to the patient's satisfaction, and she verbalized understanding of the information and agreement with the plan.           Approximately 58 minutes were spent face-to-face with the patient.  Approximately 72 minutes in total were spent on this encounter, which includes face-to-face time and non-face-to-face time preparing to see the patient (e.g., review of tests), obtaining and/or reviewing separately obtained history, documenting clinical information in the electronic or other health record, independently interpreting results (not separately reported) and communicating results to the patient/family/caregiver, or care coordination (not separately reported).         Griffin Gallo, DNP, APRN, FNP-BC, AOCNP, CGRA  Nurse Practitioner, Hereditary and High-Risk Clinic  Department of Hematology and Oncology  [facility]

## 2023-01-04 ENCOUNTER — PATIENT MESSAGE (OUTPATIENT)
Dept: HEMATOLOGY/ONCOLOGY | Facility: CLINIC | Age: 58
End: 2023-01-04

## 2023-01-04 ENCOUNTER — OFFICE VISIT (OUTPATIENT)
Dept: HEMATOLOGY/ONCOLOGY | Facility: CLINIC | Age: 58
End: 2023-01-04
Payer: OTHER GOVERNMENT

## 2023-01-04 DIAGNOSIS — C16.9 GASTRIC ADENOCARCINOMA: ICD-10-CM

## 2023-01-04 DIAGNOSIS — Z71.83 ENCOUNTER FOR NONPROCREATIVE GENETIC COUNSELING: Primary | ICD-10-CM

## 2023-01-04 DIAGNOSIS — Z80.42 FAMILY HISTORY OF PROSTATE CANCER: ICD-10-CM

## 2023-01-04 DIAGNOSIS — Z86.010 HISTORY OF COLON POLYPS: ICD-10-CM

## 2023-01-04 DIAGNOSIS — Z80.3 FAMILY HISTORY OF BREAST CANCER: ICD-10-CM

## 2023-01-04 PROCEDURE — 99999 PR PBB SHADOW E&M-EST. PATIENT-LVL III: CPT | Mod: PBBFAC,,, | Performed by: NURSE PRACTITIONER

## 2023-01-04 PROCEDURE — 99215 PR OFFICE/OUTPT VISIT, EST, LEVL V, 40-54 MIN: ICD-10-PCS | Mod: S$PBB,,, | Performed by: NURSE PRACTITIONER

## 2023-01-04 PROCEDURE — 99417 PR PROLONGED SVC, OUTPT, W/WO DIRECT PT CONTACT,  EA ADDTL 15 MIN: ICD-10-PCS | Mod: S$PBB,,, | Performed by: NURSE PRACTITIONER

## 2023-01-04 PROCEDURE — 99999 PR PBB SHADOW E&M-EST. PATIENT-LVL III: ICD-10-PCS | Mod: PBBFAC,,, | Performed by: NURSE PRACTITIONER

## 2023-01-04 PROCEDURE — 99213 OFFICE O/P EST LOW 20 MIN: CPT | Mod: PBBFAC | Performed by: NURSE PRACTITIONER

## 2023-01-04 PROCEDURE — 99417 PROLNG OP E/M EACH 15 MIN: CPT | Mod: S$PBB,,, | Performed by: NURSE PRACTITIONER

## 2023-01-04 PROCEDURE — 99215 OFFICE O/P EST HI 40 MIN: CPT | Mod: S$PBB,,, | Performed by: NURSE PRACTITIONER

## 2023-01-10 ENCOUNTER — PATIENT MESSAGE (OUTPATIENT)
Dept: HEMATOLOGY/ONCOLOGY | Facility: CLINIC | Age: 58
End: 2023-01-10
Payer: OTHER GOVERNMENT

## 2023-01-24 ENCOUNTER — PATIENT MESSAGE (OUTPATIENT)
Dept: HEMATOLOGY/ONCOLOGY | Facility: CLINIC | Age: 58
End: 2023-01-24
Payer: OTHER GOVERNMENT

## 2023-02-13 ENCOUNTER — TELEPHONE (OUTPATIENT)
Dept: PSYCHIATRY | Facility: CLINIC | Age: 58
End: 2023-02-13
Payer: OTHER GOVERNMENT

## 2023-02-14 ENCOUNTER — OFFICE VISIT (OUTPATIENT)
Dept: PSYCHIATRY | Facility: CLINIC | Age: 58
End: 2023-02-14
Payer: OTHER GOVERNMENT

## 2023-02-14 DIAGNOSIS — F43.23 ADJUSTMENT DISORDER WITH MIXED ANXIETY AND DEPRESSED MOOD: Primary | ICD-10-CM

## 2023-02-14 DIAGNOSIS — C16.9 GASTRIC ADENOCARCINOMA: ICD-10-CM

## 2023-02-14 PROCEDURE — 90834 PR PSYCHOTHERAPY W/PATIENT, 45 MIN: ICD-10-PCS | Mod: ,,, | Performed by: PSYCHOLOGIST

## 2023-02-14 PROCEDURE — 90834 PSYTX W PT 45 MINUTES: CPT | Mod: ,,, | Performed by: PSYCHOLOGIST

## 2023-02-14 NOTE — PROGRESS NOTES
PSYCHO-ONCOLOGY NOTE/ Individual Psychotherapy     Date: 2/14/2023   Site:  [facility]      Therapeutic Intervention: Met with patient.  Outpatient - Behavior modifying psychotherapy 45 min - CPT code 57724 and Outpatient - Supportive psychotherapy 45 min - CPT Code 03553    This includes face to face time and non-face to face time preparing to see the patient, obtaining and/or reviewing separately obtained history, documenting clinical information in the electronic or other health record, independently interpreting results and communicating results to the patient/family/caregiver, or care coordinator.      Patient was last seen by me on 1/3/2023    Problem list  Patient Active Problem List   Diagnosis    HTN (hypertension)    Asthma    Pre-diabetes    Kidney stone    Chronic lumbar pain    Hyperlipemia    Anal fissure    Iron deficiency anemia, unspecified    OB + stool    Dysphagia       Chief complaint/reason for encounter: depression, anxiety, and interpersonal     Met with patient to evaluate psychosocial adaptation to diagnosis/treatment of stage IV gastric adenocarcinoma    Current Medications  Current Outpatient Medications   Medication    acetaminophen (TYLENOL) 500 MG tablet    aspirin 81 MG Chew    atorvastatin (LIPITOR) 20 MG tablet    azilsartan med-chlorthalidone (EDARBYCLOR) 40-25 mg Tab    azithromycin (Z-OPHELIA) 250 MG tablet    budesonide-formoterol 80-4.5 mcg (SYMBICORT) 80-4.5 mcg/actuation HFAA    cetirizine (ZYRTEC) 10 MG tablet    COVID-19 vacc,mRNA,Moderna,-PF (SPIKEVAX, PF,) 100 mcg/0.5 mL Susp    docusate sodium (COLACE) 100 MG capsule    estradiol-norethindrone (COMBIPATCH) 0.05-0.14 mg/24 hr    hydrocortisone (ANUSOL-HC) 25 mg suppository    hydrocortisone 2.5 % cream    ibuprofen (ADVIL,MOTRIN) 400 MG tablet    ibuprofen (ADVIL,MOTRIN) 600 MG tablet    LIDOcaine (LIDODERM) 5 %    linaCLOtide (LINZESS) 72 mcg Cap capsule    multivitamin (THERAGRAN) per tablet    omega-3 fatty acids/fish  oil (FISH OIL-OMEGA-3 FATTY ACIDS) 300-1,000 mg capsule    pantoprazole (PROTONIX) 40 MG tablet    traMADoL (ULTRAM) 50 mg tablet    vitamin D3-folic acid 5,000 unit- 1 mg Tab    vitamin E 100 UNIT capsule    zinc gluconate 50 mg tablet     No current facility-administered medications for this visit.     Facility-Administered Medications Ordered in Other Visits   Medication Frequency    0.9%  NaCl infusion Continuous    mupirocin 2 % ointment On Call Procedure       ONCOLOGY HISTORY  Oncology History    No history exists.       Objective:  Felicia Ruffin arrived promptly for the session. Ms. Ruffin was independently ambulatory at the time of session. The patient was fully cooperative throughout the session.  Appearance: age appropriate, casually  dressed, well groomed  Behavior/Cooperation: friendly and cooperative  Speech: pressured  Mood: anxious  Affect: anxious  Thought Process: goal-directed, logical  Thought Content: normal,  No delusions or paranoia; did not appear to be responding to internal stimuli during the session  Orientation: grossly intact  Memory: grossly intact  Attention Span/Concentration: Attends to session without distraction; reports no difficulty  Fund of Knowledge: average  Estimate of Intelligence: above average from verbal skills and history  Cognition: grossly intact  Insight: patient has awareness of illness; good insight into own behavior and behavior of others  Judgment: the patient's behavior is adequate to circumstances    NCCN Distress thermometer:   DISTRESS SCREENING 1/4/2023   Distress Score 7        Interval history and content of current session:  Discussed  good tx response and family's adaptation to disease and treatment status. Reports to be coping with moderate difficulty, describing pervasive thoughts/concerns for recurrence and longevity. Evaluated cognitive response, paying particular attention to negative intrusive thoughts of a persistent and detrimental nature.  Thoughts of this type are in evidence with moderate distress. Provided cognitive behavioral therapy to address negative cognitions, discussing scheduled worry time and follow up w/ oncologist regarding prognostic data. Noted potential for engagement in trials at [facility] ([facility]). Noted change from Vistaril to Trazodone for sleep. Identified and evaluated psychosocial and environmental stressors secondary to diagnosis and treatment.  Examined proactive behaviors that may be implemented to minimize or ameliorate psychosocial stressors secondary to diagnosis and treatment.     Risk parameters:   Patient reports no suicidal ideation  Patient reports no homicidal ideation  Patient reports no self-injurious behavior  Patient reports no violent behavior   Safety needs:  None at this time      Verbal deficits: None     Patient's response to intervention:The patient's response to intervention is accepting, motivated.     Progress toward goals and other mental status changes:  The patient's progress toward goals is good.      Progress to date:Progress - Ongoing, but Slow      Goals from last visit: Met        Patient Strengths: verbal, intelligent, successful, good social support, good insight, commitment to wellness, strong iron, strong cultural traditions        Treatment Plan:individual psychotherapy and medication management by physician  Target symptoms: depression, anxiety , adjustment  Why chosen therapy is appropriate versus another modality: relevant to diagnosis, patient responds to this modality, evidence based practice  Outcome monitoring methods: self-report, observation, tx team feedback  Therapeutic intervention type: insight oriented psychotherapy, behavior modifying psychotherapy, supportive psychotherapy  Prognosis: Fair                            Behavioral goals:               Social engagement: continued, adaptive boundaries regarding control behaviors, interpersonal boundaries              Therapy: CBT,  time based pacing, negotiation of new role as caregivee, adaptation to scheduling strategies    Return to clinic: 6 weeks (early April)     Length of Service (minutes direct face-to-face contact): 45    Diagnosis:     ICD-10-CM ICD-9-CM   1. Adjustment disorder with mixed anxiety and depressed mood  F43.23 309.28   2. Gastric adenocarcinoma  C16.9 151.9                [facility] License #[id number]  [place] License #[id number]

## 2023-04-05 ENCOUNTER — PATIENT MESSAGE (OUTPATIENT)
Dept: HEMATOLOGY/ONCOLOGY | Facility: CLINIC | Age: 58
End: 2023-04-05
Payer: OTHER GOVERNMENT

## 2023-04-18 ENCOUNTER — PATIENT MESSAGE (OUTPATIENT)
Dept: HEMATOLOGY/ONCOLOGY | Facility: CLINIC | Age: 58
End: 2023-04-18
Payer: OTHER GOVERNMENT

## 2023-04-24 DIAGNOSIS — Z80.3 FAMILY HISTORY OF BREAST CANCER: ICD-10-CM

## 2023-04-24 DIAGNOSIS — Z80.42 FAMILY HISTORY OF PROSTATE CANCER: ICD-10-CM

## 2023-04-24 DIAGNOSIS — Z86.010 HISTORY OF COLON POLYPS: ICD-10-CM

## 2023-04-24 DIAGNOSIS — C16.9 GASTRIC ADENOCARCINOMA: Primary | ICD-10-CM

## 2023-04-26 ENCOUNTER — TELEPHONE (OUTPATIENT)
Dept: PSYCHIATRY | Facility: CLINIC | Age: 58
End: 2023-04-26
Payer: OTHER GOVERNMENT

## 2023-04-27 ENCOUNTER — OFFICE VISIT (OUTPATIENT)
Dept: PSYCHIATRY | Facility: CLINIC | Age: 58
End: 2023-04-27
Payer: OTHER GOVERNMENT

## 2023-04-27 DIAGNOSIS — F43.23 ADJUSTMENT DISORDER WITH MIXED ANXIETY AND DEPRESSED MOOD: Primary | ICD-10-CM

## 2023-04-27 PROCEDURE — 90837 PSYTX W PT 60 MINUTES: CPT | Mod: ,,, | Performed by: PSYCHOLOGIST

## 2023-04-27 PROCEDURE — 90837 PR PSYCHOTHERAPY W/PATIENT, 60 MIN: ICD-10-PCS | Mod: ,,, | Performed by: PSYCHOLOGIST

## 2023-04-27 NOTE — PROGRESS NOTES
PSYCHO-ONCOLOGY NOTE/ Individual Psychotherapy     Date: 4/27/2023   Site:  [facility]      Therapeutic Intervention: Met with patient.  Outpatient - Insight oriented psychotherapy 60 min - CPT code 98258, Outpatient - Behavior modifying psychotherapy 60 min - CPT code 47607, and Outpatient - Supportive psychotherapy 60 min - CPT Code 00725    This includes face to face time and non-face to face time preparing to see the patient, obtaining and/or reviewing separately obtained history, documenting clinical information in the electronic or other health record, independently interpreting results and communicating results to the patient/family/caregiver, or care coordinator.      Patient was last seen by me on 2/14/2023    Problem list  Patient Active Problem List   Diagnosis    HTN (hypertension)    Asthma    Pre-diabetes    Kidney stone    Chronic lumbar pain    Hyperlipemia    Anal fissure    Iron deficiency anemia, unspecified    OB + stool    Dysphagia       Chief complaint/reason for encounter: depression, anxiety, and interpersonal     Met with patient to evaluate psychosocial adaptation to diagnosis/treatment of stage IV gastric adenocarcinoma    Current Medications  Current Outpatient Medications   Medication    acetaminophen (TYLENOL) 500 MG tablet    aspirin 81 MG Chew    atorvastatin (LIPITOR) 20 MG tablet    azilsartan med-chlorthalidone (EDARBYCLOR) 40-25 mg Tab    azithromycin (Z-OPHELIA) 250 MG tablet    budesonide-formoterol 80-4.5 mcg (SYMBICORT) 80-4.5 mcg/actuation HFAA    cetirizine (ZYRTEC) 10 MG tablet    COVID-19 vacc,mRNA,Moderna,-PF (SPIKEVAX, PF,) 100 mcg/0.5 mL Susp    docusate sodium (COLACE) 100 MG capsule    estradiol-norethindrone (COMBIPATCH) 0.05-0.14 mg/24 hr    hydrocortisone (ANUSOL-HC) 25 mg suppository    hydrocortisone 2.5 % cream    ibuprofen (ADVIL,MOTRIN) 400 MG tablet    ibuprofen (ADVIL,MOTRIN) 600 MG tablet    LIDOcaine (LIDODERM) 5 %    multivitamin (THERAGRAN) per tablet     omega-3 fatty acids/fish oil (FISH OIL-OMEGA-3 FATTY ACIDS) 300-1,000 mg capsule    pantoprazole (PROTONIX) 40 MG tablet    traMADoL (ULTRAM) 50 mg tablet    vitamin D3-folic acid 5,000 unit- 1 mg Tab    vitamin E 100 UNIT capsule    zinc gluconate 50 mg tablet     No current facility-administered medications for this visit.     Facility-Administered Medications Ordered in Other Visits   Medication Frequency    0.9%  NaCl infusion Continuous    mupirocin 2 % ointment On Call Procedure       ONCOLOGY HISTORY  Oncology History    No history exists.       Objective:  Felicia Ruffin arrived promptly for the session. Ms. Ruffin was independently ambulatory at the time of session. The patient was fully cooperative throughout the session.  Appearance: age appropriate, casually  dressed, well groomed  Behavior/Cooperation: friendly and cooperative  Speech: normal in rate, volume, and tone and appropriate quality, quantity and organization of sentences  Mood: anxious  Affect: mood congruent and appropriate  Thought Process: goal-directed, logical  Thought Content: normal,  No delusions or paranoia; did not appear to be responding to internal stimuli during the session  Orientation: grossly intact  Memory: grossly intact  Attention Span/Concentration: Attends to session without distraction; reports no difficulty  Fund of Knowledge: above average  Estimate of Intelligence: above average from verbal skills and history  Cognition: grossly intact  Insight: patient has awareness of illness; good insight into own behavior and behavior of others  Judgment: the patient's behavior is adequate to circumstances    NCCN Distress thermometer:   DISTRESS SCREENING 1/4/2023   Distress Score 7        Interval history and content of current session: Discussed treatment and family's adaptation to disease and treatment status. Reports to be coping with moderate difficulty, noting improvements in ability to cope secondary to tx response and  strategies discussed in session. Evaluated cognitive response, paying particular attention to negative intrusive thoughts of a persistent and detrimental nature. Thoughts of this type are in evidence with moderate distress and are associated w/  and son's ability to adapt to additional tasks and responsibilities around the home (cooking, cleaning, finances, etc.) and stability/limited improvement in imaging. Provided cognitive behavioral therapy to address negative cognitions and problem solved through redirection of specific behaviors. Provided additional psychotherapeutic support in exploration of increased likelihood of employment stability and current chemo SE. Identified and evaluated psychosocial and environmental stressors secondary to diagnosis and treatment.  Examined proactive behaviors that may be implemented to minimize or ameliorate psychosocial stressors secondary to diagnosis and treatment.     Risk parameters:   Patient reports no suicidal ideation  Patient reports no homicidal ideation  Patient reports no self-injurious behavior  Patient reports no violent behavior   Safety needs:  None at this time      Verbal deficits: None     Patient's response to intervention:The patient's response to intervention is accepting, motivated.     Progress toward goals and other mental status changes:  The patient's progress toward goals is good.      Progress to date:Progress - Ongoing, but Slow      Goals from last visit: Met        Patient Strengths: verbal, intelligent, successful, good social support, good insight, commitment to wellness, strong iron, strong cultural traditions        Treatment Plan:individual psychotherapy and medication management by physician  Target symptoms: depression, anxiety , adjustment  Why chosen therapy is appropriate versus another modality: relevant to diagnosis, patient responds to this modality, evidence based practice  Outcome monitoring methods: self-report,  observation, tx team feedback  Therapeutic intervention type: insight oriented psychotherapy, behavior modifying psychotherapy, supportive psychotherapy  Prognosis: Fair                            Behavioral goals:               Social engagement: continued, adaptive boundaries regarding control behaviors, interpersonal boundaries              Therapy: CBT, time based pacing, negotiation of new role as caregivee, adaptation to scheduling strategies    Return to clinic: 1 month     Length of Service (minutes direct face-to-face contact): 60    Diagnosis: No diagnosis found.             [facility] License #[id number]  [place] License #[id number]

## 2023-04-28 ENCOUNTER — PATIENT MESSAGE (OUTPATIENT)
Dept: CARDIOLOGY | Facility: CLINIC | Age: 58
End: 2023-04-28
Payer: OTHER GOVERNMENT

## 2023-05-02 ENCOUNTER — PATIENT MESSAGE (OUTPATIENT)
Dept: HEMATOLOGY/ONCOLOGY | Facility: CLINIC | Age: 58
End: 2023-05-02
Payer: OTHER GOVERNMENT

## 2023-05-09 ENCOUNTER — PATIENT MESSAGE (OUTPATIENT)
Dept: HEMATOLOGY/ONCOLOGY | Facility: CLINIC | Age: 58
End: 2023-05-09
Payer: OTHER GOVERNMENT

## 2023-05-11 ENCOUNTER — TELEPHONE (OUTPATIENT)
Dept: HEMATOLOGY/ONCOLOGY | Facility: CLINIC | Age: 58
End: 2023-05-11
Payer: OTHER GOVERNMENT

## 2023-05-15 ENCOUNTER — PATIENT MESSAGE (OUTPATIENT)
Dept: HEMATOLOGY/ONCOLOGY | Facility: CLINIC | Age: 58
End: 2023-05-15
Payer: OTHER GOVERNMENT

## 2023-05-16 ENCOUNTER — TELEPHONE (OUTPATIENT)
Dept: HEMATOLOGY/ONCOLOGY | Facility: CLINIC | Age: 58
End: 2023-05-16
Payer: OTHER GOVERNMENT

## 2023-05-16 NOTE — TELEPHONE ENCOUNTER
Pt stated no questions re: test selection.  Advised pt I am waiting to hear back from staff re: [facility] blood send-out process and will follow up with pt when I do.  Pt expressed understanding, agreement.

## 2023-05-22 NOTE — Clinical Note
Postpone 2 weeks for scheduling (pt anticipating MD Curtis visit on 01/17), FU 1 month please The patient is a 16y Male complaining of abdominal pain.

## 2023-05-25 ENCOUNTER — LAB VISIT (OUTPATIENT)
Dept: LAB | Facility: HOSPITAL | Age: 58
End: 2023-05-25
Attending: NURSE PRACTITIONER
Payer: OTHER GOVERNMENT

## 2023-05-25 DIAGNOSIS — C16.9 GASTRIC ADENOCARCINOMA: ICD-10-CM

## 2023-05-25 DIAGNOSIS — Z86.010 HISTORY OF COLON POLYPS: ICD-10-CM

## 2023-05-25 DIAGNOSIS — Z80.42 FAMILY HISTORY OF PROSTATE CANCER: ICD-10-CM

## 2023-05-25 DIAGNOSIS — Z80.3 FAMILY HISTORY OF BREAST CANCER: ICD-10-CM

## 2023-05-25 PROCEDURE — 36415 COLL VENOUS BLD VENIPUNCTURE: CPT | Mod: PN | Performed by: NURSE PRACTITIONER

## 2023-05-31 ENCOUNTER — PATIENT MESSAGE (OUTPATIENT)
Dept: HEMATOLOGY/ONCOLOGY | Facility: CLINIC | Age: 58
End: 2023-05-31
Payer: OTHER GOVERNMENT

## 2023-06-06 ENCOUNTER — PATIENT MESSAGE (OUTPATIENT)
Dept: DERMATOLOGY | Facility: CLINIC | Age: 58
End: 2023-06-06
Payer: OTHER GOVERNMENT

## 2023-06-12 ENCOUNTER — PATIENT MESSAGE (OUTPATIENT)
Dept: HEMATOLOGY/ONCOLOGY | Facility: CLINIC | Age: 58
End: 2023-06-12
Payer: OTHER GOVERNMENT

## 2023-06-12 LAB
GENETIC COUNSELING?: YES
GENSO SPECIMEN TYPE: NORMAL
MISCELLANEOUS GENETIC TEST NAME: NORMAL
PARTENTAL OR SIBLING TESTING?: NO
REFERENCE LAB: NORMAL
TEST RESULT: NORMAL

## 2023-06-15 ENCOUNTER — OFFICE VISIT (OUTPATIENT)
Dept: PSYCHIATRY | Facility: CLINIC | Age: 58
End: 2023-06-15
Payer: OTHER GOVERNMENT

## 2023-06-15 DIAGNOSIS — C16.9 GASTRIC ADENOCARCINOMA: ICD-10-CM

## 2023-06-15 DIAGNOSIS — F43.23 ADJUSTMENT DISORDER WITH MIXED ANXIETY AND DEPRESSED MOOD: Primary | ICD-10-CM

## 2023-06-15 PROCEDURE — 90837 PSYTX W PT 60 MINUTES: CPT | Mod: ,,, | Performed by: PSYCHOLOGIST

## 2023-06-15 PROCEDURE — 99999 PR PBB SHADOW E&M-EST. PATIENT-LVL I: CPT | Mod: PBBFAC,,, | Performed by: PSYCHOLOGIST

## 2023-06-15 PROCEDURE — 99211 OFF/OP EST MAY X REQ PHY/QHP: CPT | Mod: PBBFAC,PN | Performed by: PSYCHOLOGIST

## 2023-06-15 PROCEDURE — 90837 PR PSYCHOTHERAPY W/PATIENT, 60 MIN: ICD-10-PCS | Mod: ,,, | Performed by: PSYCHOLOGIST

## 2023-06-15 PROCEDURE — 99999 PR PBB SHADOW E&M-EST. PATIENT-LVL I: ICD-10-PCS | Mod: PBBFAC,,, | Performed by: PSYCHOLOGIST

## 2023-06-15 NOTE — PROGRESS NOTES
PSYCHO-ONCOLOGY NOTE/ Individual Psychotherapy     Date: 6/15/2023   Site:  [facility]      Therapeutic Intervention: Met with patient.  Outpatient - Insight oriented psychotherapy 60 min - CPT code 51673 and Outpatient - Supportive psychotherapy 60 min - CPT Code 27971    This includes face to face time and non-face to face time preparing to see the patient, obtaining and/or reviewing separately obtained history, documenting clinical information in the electronic or other health record, independently interpreting results and communicating results to the patient/family/caregiver, or care coordinator.      Patient was last seen by me on 4/27/2023    Problem list  Patient Active Problem List   Diagnosis    HTN (hypertension)    Asthma    Pre-diabetes    Kidney stone    Chronic lumbar pain    Hyperlipemia    Anal fissure    Iron deficiency anemia, unspecified    OB + stool    Dysphagia       Chief complaint/reason for encounter: depression, anxiety, and interpersonal     Met with patient to evaluate psychosocial adaptation to diagnosis/treatment of stage IV gastric adenocarcinoma    Current Medications  Current Outpatient Medications   Medication    acetaminophen (TYLENOL) 500 MG tablet    aspirin 81 MG Chew    atorvastatin (LIPITOR) 20 MG tablet    azilsartan med-chlorthalidone (EDARBYCLOR) 40-25 mg Tab    azithromycin (Z-OPHELIA) 250 MG tablet    budesonide-formoterol 80-4.5 mcg (SYMBICORT) 80-4.5 mcg/actuation HFAA    cetirizine (ZYRTEC) 10 MG tablet    COVID-19 vacc,mRNA,Moderna,-PF (SPIKEVAX, PF,) 100 mcg/0.5 mL Susp    docusate sodium (COLACE) 100 MG capsule    estradiol-norethindrone (COMBIPATCH) 0.05-0.14 mg/24 hr    hydrocortisone (ANUSOL-HC) 25 mg suppository    hydrocortisone 2.5 % cream    ibuprofen (ADVIL,MOTRIN) 400 MG tablet    ibuprofen (ADVIL,MOTRIN) 600 MG tablet    LIDOcaine (LIDODERM) 5 %    multivitamin (THERAGRAN) per tablet    omega-3 fatty acids/fish oil (FISH OIL-OMEGA-3 FATTY ACIDS) 300-1,000  mg capsule    pantoprazole (PROTONIX) 40 MG tablet    traMADoL (ULTRAM) 50 mg tablet    vitamin D3-folic acid 5,000 unit- 1 mg Tab    vitamin E 100 UNIT capsule    zinc gluconate 50 mg tablet     No current facility-administered medications for this visit.     Facility-Administered Medications Ordered in Other Visits   Medication Frequency    0.9%  NaCl infusion Continuous    mupirocin 2 % ointment On Call Procedure       ONCOLOGY HISTORY  Oncology History    No history exists.       Objective:  Felicia Ruffin arrived promptly for the session. Ms. Ruffin was independently ambulatory at the time of session. The patient was fully cooperative throughout the session.  Appearance: age appropriate, casually  dressed, well groomed  Behavior/Cooperation: friendly and cooperative  Speech: normal in rate, volume, and tone and appropriate quality, quantity and organization of sentences  Mood: anxious, irritable  Affect: mood congruent and appropriate  Thought Process: goal-directed, logical  Thought Content: normal,  No delusions or paranoia; did not appear to be responding to internal stimuli during the session  Orientation: grossly intact  Memory: grossly intact  Attention Span/Concentration: Attends to session without distraction; reports no difficulty  Fund of Knowledge: average  Estimate of Intelligence: above average from verbal skills and history  Cognition: grossly intact  Insight: patient has awareness of illness; good insight into own behavior and behavior of others  Judgment: the patient's behavior is adequate to circumstances    NCCN Distress thermometer:   DISTRESS SCREENING 1/4/2023   Distress Score 7        Interval history and content of current session: Discussed adaptation to tx, familial stressors, neuropathy, and sexual dysfunction. Reports to be coping with moderate difficulty. Evaluated cognitive response, paying particular attention to negative intrusive thoughts of a persistent and detrimental nature.  "Thoughts of this type are in evidence with moderate distress and are associated w/ feeling like a "burden" to sister and maintaining function w/in her household, despite illness.  Provided cognitive behavioral therapy to address negative cognitions and problem solved through redirection of specific behaviors. Provided additional psychotherapeutic support in discussion of anticipated procedure and potential for ongoing maintenance therapy. Identified and evaluated psychosocial and environmental stressors secondary to diagnosis and treatment.  Examined proactive behaviors that may be implemented to minimize or ameliorate psychosocial stressors secondary to diagnosis and treatment.     Risk parameters:   Patient reports no suicidal ideation  Patient reports no homicidal ideation  Patient reports no self-injurious behavior  Patient reports no violent behavior   Safety needs:  None at this time      Verbal deficits: None     Patient's response to intervention:The patient's response to intervention is accepting, motivated.     Progress toward goals and other mental status changes:  The patient's progress toward goals is fair .      Progress to date:Progress - Ongoing, but Slow      Goals from last visit: Met        Patient Strengths: verbal, intelligent, successful, good social support, good insight, commitment to wellness, strong iron, strong cultural traditions        Treatment Plan:individual psychotherapy and medication management by physician  Target symptoms: depression, anxiety , adjustment  Why chosen therapy is appropriate versus another modality: relevant to diagnosis, patient responds to this modality, evidence based practice  Outcome monitoring methods: self-report, observation, tx team feedback  Therapeutic intervention type: insight oriented psychotherapy, behavior modifying psychotherapy, supportive psychotherapy  Prognosis: Fair                            Behavioral goals:               Social engagement: " continued, adaptive boundaries regarding control behaviors, interpersonal boundaries              Therapy: CBT, time based pacing, negotiation of new role as caregivee, adaptation to scheduling strategies    *referral placed for IO    Return to clinic: 3 weeks     Length of Service (minutes direct face-to-face contact): 60    Diagnosis:     ICD-10-CM ICD-9-CM   1. Adjustment disorder with mixed anxiety and depressed mood  F43.23 309.28   2. Gastric adenocarcinoma  C16.9 151.9                Stefan Colon Psy.D.  [place] License #[id number]  [place] License #[id number]

## 2023-06-16 ENCOUNTER — PATIENT MESSAGE (OUTPATIENT)
Dept: HEMATOLOGY/ONCOLOGY | Facility: CLINIC | Age: 58
End: 2023-06-16
Payer: OTHER GOVERNMENT

## 2023-06-16 ENCOUNTER — TELEPHONE (OUTPATIENT)
Dept: HEMATOLOGY/ONCOLOGY | Facility: CLINIC | Age: 58
End: 2023-06-16
Payer: OTHER GOVERNMENT

## 2023-06-16 NOTE — TELEPHONE ENCOUNTER
I spoke with the patient about getting an IO appt scheduled per referral. I explained in detail what we offer and listed some symptoms/side effects that we can help address using our support services. She verbalized understanding and asked for appt 7/11. Location was reviewed and a message was sent to the portal if they had any follow up questions.

## 2023-06-22 ENCOUNTER — PATIENT MESSAGE (OUTPATIENT)
Dept: HEMATOLOGY/ONCOLOGY | Facility: CLINIC | Age: 58
End: 2023-06-22
Payer: OTHER GOVERNMENT

## 2023-06-24 DIAGNOSIS — K59.04 CHRONIC IDIOPATHIC CONSTIPATION: ICD-10-CM

## 2023-06-26 RX ORDER — LINACLOTIDE 72 UG/1
CAPSULE, GELATIN COATED ORAL
Qty: 30 CAPSULE | Refills: 11 | Status: SHIPPED | OUTPATIENT
Start: 2023-06-26

## 2023-06-28 ENCOUNTER — PATIENT MESSAGE (OUTPATIENT)
Dept: HEMATOLOGY/ONCOLOGY | Facility: CLINIC | Age: 58
End: 2023-06-28
Payer: OTHER GOVERNMENT

## 2023-06-29 ENCOUNTER — PATIENT MESSAGE (OUTPATIENT)
Dept: HEMATOLOGY/ONCOLOGY | Facility: CLINIC | Age: 58
End: 2023-06-29
Payer: OTHER GOVERNMENT

## 2023-07-04 ENCOUNTER — PATIENT MESSAGE (OUTPATIENT)
Dept: HEMATOLOGY/ONCOLOGY | Facility: CLINIC | Age: 58
End: 2023-07-04
Payer: OTHER GOVERNMENT

## 2023-07-05 ENCOUNTER — OFFICE VISIT (OUTPATIENT)
Dept: HEMATOLOGY/ONCOLOGY | Facility: CLINIC | Age: 58
End: 2023-07-05
Payer: OTHER GOVERNMENT

## 2023-07-05 ENCOUNTER — PATIENT MESSAGE (OUTPATIENT)
Dept: HEMATOLOGY/ONCOLOGY | Facility: CLINIC | Age: 58
End: 2023-07-05

## 2023-07-05 ENCOUNTER — TELEPHONE (OUTPATIENT)
Dept: HEMATOLOGY/ONCOLOGY | Facility: CLINIC | Age: 58
End: 2023-07-05
Payer: OTHER GOVERNMENT

## 2023-07-05 DIAGNOSIS — Z71.83 ENCOUNTER FOR NONPROCREATIVE GENETIC COUNSELING: Primary | ICD-10-CM

## 2023-07-05 DIAGNOSIS — C16.9 GASTRIC ADENOCARCINOMA: ICD-10-CM

## 2023-07-05 PROCEDURE — 99214 PR OFFICE/OUTPT VISIT, EST, LEVL IV, 30-39 MIN: ICD-10-PCS | Mod: 95,,, | Performed by: NURSE PRACTITIONER

## 2023-07-05 PROCEDURE — 99214 OFFICE O/P EST MOD 30 MIN: CPT | Mod: 95,,, | Performed by: NURSE PRACTITIONER

## 2023-07-05 NOTE — PROGRESS NOTES
"Cancer Genetics  Hereditary and High-Risk Clinic  Department of Hematology and Oncology  [facility]    Date of Service:  23  Visit Provider:  Griffin Gallo DNP  Collaborating Physician:  Pauline Whelan MD    Patient ID  Name: Felicia Ruffin    : 1965    MRN: [id number]      Televisit Information  The patient location is: [place], [place].    The chief complaint leading to consultation is:  As below.    Visit type: audiovisual followed by audio-only.  The reason for the audio-only service rather than synchronous-audio-and-video virtual visit was related to technical difficulties or patient preference/necessity.    Face-to-face time with patient:  Approximately 2 minutes.  Non-face-to-face time spent with patient:  Approximately 17 minutes.  Approximately 34 minutes in total were spent on this encounter, which includes face-to-face time and non-face-to-face time preparing to see the patient (e.g., review of tests), obtaining and/or reviewing separately obtained history, documenting clinical information in the electronic or other health record, independently interpreting results (not separately reported) and communicating results to the patient/family/caregiver, or care coordination (not separately reported).  Each patient to whom he or she provides medical services by telemedicine is:  (1) informed of the relationship between the physician and patient and the respective role of any other health care provider with respect to management of the patient; and (2) notified that he or she may decline to receive medical services by telemedicine and may withdraw from such care at any time.  Notes:  As below.    SUBJECTIVE      Chief Complaint: Results    History of Present Illness (HPI):  Felicia Rufifn ("Felicia"), 57 y.o., assigned female sex at birth, initially presented on 23 for cancer-genetic risk assessment (upon referral by Tee Porter MD) and subsequently underwent " "hereditary cancer genetic testing.  This test revealed no clinically significant genetic variants or variants of uncertain significance in the genes tested.  She returns today for post-test genetic counseling.    Germline (Hereditary) Cancer Susceptibility Gene Testing  See results in Assessment/Plan below.    Focused Medical History  Cancer:  Yes  Gastric adenocarcinoma, diagnosed at age 56  A tumor was found in the gastric cardia on 10/18/2022 upper GI endoscopy and was biopsied, with final pathologic diagnosis of adenocarcinoma; there was metastatic involvement of the omentum that was surgically excised by Dr. Porter on 11/08/2022; patient is undergoing chemotherapy  Being followed/treated at [facility] [place] and at [facility]    Summary of tumor genomics pertinent to this visit:    Immunohistochemistry (IHC) testing for mismatch repair (MMR) proteins:  MLH1:  Intact nuclear expression   PMS2:  Intact nuclear expression  MSH2:  Intact nuclear expression   MSH6:  Intact nuclear expression     TEMPUS xT:  Omentum tumor specimen collected 11/8/22  Results reported 12/1/22  Reported results pertinent to this visit:  MSI status:  Stable (EDUARDO)  Variants of unknown significance:  Gene Variant Variant Allele Fraction (%)   ARID1B c.5543C>T p.C8314T 51.0   ATP7B c.4295C>T p.Q8942Q 47.1   VSIR c.694C>T p.R232W 43.9   SETBP1 c.583A>C p.T195P 41.1     TEMPUS xF:  Peripheral blood cfDNA specimen collected 12/5/22  Results reported 12/14/22  Reported results pertinent to this visit:  MSI status:  MSI-H not detected    Colon polyp:  Yes  One colonoscopy prior to 2020:  There were polyps - "I think he said two"  10/02/2020 colonoscopy, age 54y:  One (1) 7-mm sessile polyp in the sigmoid colon, removed, with final pathologic diagnosis of hyperplastic polyp  Three (3) 2- to 3-mm sessile polyps in the sigmoid colon and the transverse colon, treated with hot-biopsy forceps  Other benign " "tumor/mass:  Yes  Uterine fibroid  Left adrenal gland nodule, stable, non-hypermetabolic, measuring 10 mm in size, unchanged per 6/19/23 PET-CT report, consistent with a lipid-rich adenoma  Pancreatitis or pancreatic cyst:  No  Reproductive organs:  In situ  Blood disorder:  No    Breast Cancer Risk Assessment Questionnaire  Race and ethnicity:  White, Not  or /a   Weight:  155 lb  Height:  5'2"  Mammographic breast density:  scattered fibroglandular densities  Age at menarche:  11y-12y  Age at first live childbirth:  39y  Menopausal status:  postmenopausal  Age at menopause, if applicable:  LMP in 2017 (at age 51 or 52)  Hormone replacement therapy use history:  only Combipatch (off now)  Breast biopsy history and findings:  none  Thoracic radiation therapy history:  none     Tobacco Use  Never smoker     Ancestry  Ashkenazi Baptism:  No  Macedonian, Cajun Bengali  Consanguinity:  Yes - mother's parents were cousins (not sure whether first cousins)     Family Oncologic History  ** The pedigree below was placed into this note in a size that produced a legible font.  If it is appearing small/illegible on your screen, expand this note window horizontally. **  *  Age of breast cancer diagnosis in maternal aunt noted above was 90y.  Hereditary cancer genetic testing in blood relatives:  None known  Other than noted, no known history of cancer in relatives depicted in the pedigree or in:  maternal first cousins, maternal extended family, paternal first cousins, paternal extended family.  Other than noted, no known family history of benign tumors or colon polyps.  Limited knowledge of medical history of:  paternal extended family.    Review of Systems  See HPI.      OBJECTIVE     Gene Variant Review    ARID1B c.5543C>T p.X8846N:  VUS    ATP7B c.4295C>T p.C5513L:  VUS    VSIR c.694C>T p.R232W:  VUS    SETBP1 c.583A>C p.T195P:  VUS    References:  National Center for Biotechnology Information. ClinVar; " [RNO552821316.2], https://www.ncbi.nlm.nih.gov/clinvar/variation/JPI713815305.2 (accessed July 5, 2023).  National Center for Biotechnology Information. ClinVar; [IHR624739776.1], https://www.ncbi.nlm.nih.gov/clinvar/variation/FFT308013358.1 (accessed July 5, 2023).  National Center for Biotechnology Information. ClinVar; [VNW206201378.3], https://www.ncbi.nlm.nih.gov/clinvar/variation/QUX774655354.3 (accessed July 5, 2023).  National Center for Biotechnology Information. ClinVar; [[id number]], https://www.ncbi.nlm.nih.gov/clinvar/variation/DJX327357569.2 (accessed July 5, 2023).    Physical Exam  Significantly limited secondary to the inherent nature of a virtual visit.  Very pleasant patient.  Constitutional      Appearance:  Appears well developed and well nourished. No distress.   Neurological     Mental Status:  Alert and oriented.  Psychiatric         Mood and Affect:  Normal.     Thought Content:  Normal.     Speech:  Normal.     Behavior:  Normal.     Judgment:  Normal.    ASSESSMENT / PLAN      Germline (Hereditary) Cancer Susceptibility Gene Testing    This is a partial report.  The complete report can be found in Felicia[facility], and Felicia has been provided with a copy.    No clinically significant mutations or variants of unknown significance were identified; in other words, your test came back negative (normal).     With regard to your own diagnosis of gastric cancer, the negative (normal) results in the associated genes represent a true-negative (or an informative) result, meaning that your gastric cancer was more likely to have been sporadic than hereditary; however, these genetic testing results reduce--but do not eliminate--the possibility of previously diagnosed cancers of yours having been hereditary or the possibility of your developing a hereditary cancer in the future.    Your negative results for genes associated with cancers/other conditions in your relatives, with which you have not  personally been affected, are uninformative.  In other words, your affected relatives' cancers/other conditions may have been hereditary or may have been sporadic, and without knowing that information, your negative results in the associated genes only tell us that you likely don't have a hereditary predisposition to those cancers/other conditions; however, you can still develop those cancers/other conditions and in fact may even have an increased risk for them based in part on your family history.  If the appropriate, affected relatives are found to carry a pathogenic (harmful) variant that explains their cancers/other conditions, and you do not carry that variant, your negative results would then be considered a true-negative.    Opportunity for Additional Hereditary Cancer Genetic Analysis    Can use saliva and test ERCC4 gene -- Low likelihood of clinically significant finding.  You opted out of this today, but you can contact me at any point should you wish to pursue it.    Health Maintenance / Cancer Risks and Risk Management    Only a small percentage (approximately 5%-10%) of cancers are hereditary, meaning caused by an inherited gene mutation; rather, most cancers are sporadic.  Environmental factors, lifestyle factors, and even factors beyond our control play a significant role in the development of many cancers.  As such, an individual can develop cancer even with negative genetic testing.  Furthermore, even with negative genetic testing, you can still be at increased risk for certain cancers, as you may independently have risk factors for those cancers and/or you may share risk factors with your family members affected by cancer.  For these reasons, it is strongly recommended that you ensure that your healthcare providers are aware of and up-to-date on your personal and family history so that your medical management including cancer screenings can be based in part off of this information.      It is  "recommended that you be established with a primary care provider (PCP), whom you should see at least annually for routine health maintenance.  You indicated today that your PCP is Zachary Barksdale MD.    There are models that help us to "calculate" your breast cancer risk.  I used the Lucia model to calculate your 5-year risk and the Tyrer-Cuzick model, version 8.0b, to calculate your 10-year and lifetime (to age 85) risks.   Your current, estimated risks for developing breast cancer are as follows:  5-year 1.8%-1.9% (increased risk); 10-year 4.4%-4.5% (does not reach the threshold at which risk-reducing medication is recommended unless otherwise contraindicated); lifetime (to age 85) 10.6%-10.9% (average risk).  The below recommendations are made based in part on these risk scores.    Please discuss with your oncologist(s) what breast, colorectal, gynecologic, skin, and other cancer screenings are right for you.      Also, discuss with your oncologist(s) what interventions are needed for breast cancer risk management, especially given your 5-year risk falls within the increased-risk category (defined as 5-year risk of 1.7% or higher).    Continue surveillance and management of your gastric cancer as you discuss with your oncologist(s).    Please continue to follow up with all healthcare providers as they have indicated.    Some information regarding general cancer risk reduction:  It is recommended to avoid tobacco use; be physically active; maintain a healthy weight; eat a diet rich in fruits, vegetables, and whole grains and low in saturated/trans fat, red meat, and processed meat; limit alcohol consumption (zero is best); protect against sexually transmitted infections; protect against the sun (by minimizing ultraviolet (UV) exposure, wearing UV-protective clothing, and using high-SPF sunblock), and avoid tanning beds; and get regular screenings for cancers as recommended by your healthcare team.    Regarding " Your Relatives    Your relatives also may be at increased risk for certain cancers, depending upon their own personal and family history; therefore, it is important that they, too, ensure that their own healthcare providers are aware of and up-to-date on their personal and family history so that their medical management including cancer screenings can be based in part off of this information.      Additionally, it is possible for your relatives to have hereditary cancer susceptibility gene mutations even when you have negative genetic testing.      Your relatives should speak with a healthcare provider about their cancer risks and whether genetic counseling and potentially testing may be indicated for them.  Anyone interested in pursuing cancer genetic counseling/testing may contact the [facility] at [phone] to schedule an appointment or may visit [email or web address] to locate a genetic specialist near them.    Cancer Genetics Follow-up    Moving forward, please update me with any changes to--or new information learned about--your personal or family history and with any relative's genetic testing results.  Barring any such changes, please follow up with me in 3 years.    In the meantime, please don't hesitate to reach out with any questions or concerns.        Diagnoses and Orders for This Encounter:    ICD-10-CM ICD-9-CM   1. Encounter for nonprocreative genetic counseling  Z71.83 V26.33   2. Gastric adenocarcinoma  C16.9 151.9     1. Encounter for nonprocreative genetic counseling    2. Gastric adenocarcinoma          Follow-up:  Follow up in about 3 years (around 7/5/2026).    Questions were encouraged and answered to the patient's satisfaction, and she verbalized understanding of the information and agreement with the plan.             Griffin Gallo, DNP, APRN, FNP-BC, AOCNP, CGRA  Nurse Practitioner, Hereditary and High-Risk Clinic  Department of Hematology and Oncology  [facility]     [facility]        CC:  Tee Porter MD*

## 2023-07-05 NOTE — TELEPHONE ENCOUNTER
Called patient for [employer] billing questions or concerns per Griffin Gallo. Spoke with patient and answered questions regarding billing and letter received from [facility] for additional appeal processing. Gave [employer] billing info [phone] option 9 to speak with them directly for more specific billing information. FA already approved for full coverage.

## 2023-07-05 NOTE — Clinical Note
Manjit Cornell,  Can you please message (patient prefers over phone call) patient so she can discuss some billing questions she has regarding her Tempus tests?  Thank you, Griffin

## 2023-07-10 ENCOUNTER — TELEPHONE (OUTPATIENT)
Dept: HEMATOLOGY/ONCOLOGY | Facility: CLINIC | Age: 58
End: 2023-07-10
Payer: OTHER GOVERNMENT

## 2023-07-11 ENCOUNTER — OFFICE VISIT (OUTPATIENT)
Dept: HEMATOLOGY/ONCOLOGY | Facility: CLINIC | Age: 58
End: 2023-07-11
Payer: OTHER GOVERNMENT

## 2023-07-11 ENCOUNTER — OFFICE VISIT (OUTPATIENT)
Dept: PSYCHIATRY | Facility: CLINIC | Age: 58
End: 2023-07-11
Payer: OTHER GOVERNMENT

## 2023-07-11 VITALS
OXYGEN SATURATION: 97 % | HEART RATE: 70 BPM | HEIGHT: 62 IN | WEIGHT: 159.13 LBS | SYSTOLIC BLOOD PRESSURE: 124 MMHG | DIASTOLIC BLOOD PRESSURE: 71 MMHG | TEMPERATURE: 98 F | BODY MASS INDEX: 29.28 KG/M2

## 2023-07-11 DIAGNOSIS — G62.0 CHEMOTHERAPY-INDUCED PERIPHERAL NEUROPATHY: Primary | ICD-10-CM

## 2023-07-11 DIAGNOSIS — T45.1X5A CHEMOTHERAPY-INDUCED PERIPHERAL NEUROPATHY: Primary | ICD-10-CM

## 2023-07-11 DIAGNOSIS — T45.1X5A CHEMOTHERAPY-INDUCED FATIGUE: ICD-10-CM

## 2023-07-11 DIAGNOSIS — C16.9 GASTRIC ADENOCARCINOMA: ICD-10-CM

## 2023-07-11 DIAGNOSIS — G47.00 INSOMNIA, UNSPECIFIED TYPE: ICD-10-CM

## 2023-07-11 DIAGNOSIS — F43.23 ADJUSTMENT DISORDER WITH MIXED ANXIETY AND DEPRESSED MOOD: Primary | ICD-10-CM

## 2023-07-11 DIAGNOSIS — R53.83 CHEMOTHERAPY-INDUCED FATIGUE: ICD-10-CM

## 2023-07-11 DIAGNOSIS — F43.23 ADJUSTMENT DISORDER WITH MIXED ANXIETY AND DEPRESSED MOOD: ICD-10-CM

## 2023-07-11 PROCEDURE — 99215 PR OFFICE/OUTPT VISIT, EST, LEVL V, 40-54 MIN: ICD-10-PCS | Mod: S$PBB,,, | Performed by: NURSE PRACTITIONER

## 2023-07-11 PROCEDURE — 99999 PR PBB SHADOW E&M-EST. PATIENT-LVL V: ICD-10-PCS | Mod: PBBFAC,,, | Performed by: NURSE PRACTITIONER

## 2023-07-11 PROCEDURE — 90837 PSYTX W PT 60 MINUTES: CPT | Mod: ,,, | Performed by: PSYCHOLOGIST

## 2023-07-11 PROCEDURE — 99999 PR PBB SHADOW E&M-EST. PATIENT-LVL V: CPT | Mod: PBBFAC,,, | Performed by: NURSE PRACTITIONER

## 2023-07-11 PROCEDURE — 99215 OFFICE O/P EST HI 40 MIN: CPT | Mod: PBBFAC,PN | Performed by: NURSE PRACTITIONER

## 2023-07-11 PROCEDURE — 99215 OFFICE O/P EST HI 40 MIN: CPT | Mod: S$PBB,,, | Performed by: NURSE PRACTITIONER

## 2023-07-11 PROCEDURE — 90837 PR PSYCHOTHERAPY W/PATIENT, 60 MIN: ICD-10-PCS | Mod: ,,, | Performed by: PSYCHOLOGIST

## 2023-07-11 NOTE — PROGRESS NOTES
Felicia Ruffin  57 y.o. is here to seek an integrative approach to discuss side effects related to gastric cancer treatment. Felicia Ruffin  was referred by Dr. Colon     HPI  Mrs. Ruffin reports she had started losing weight and was thinking it was from Ozempic. She then started having pain between her breastbone and it felt like food was stuck. She has been to [facility] 3 times. She has completed 13 chemo treatments. She took oxaliplatin, but stopped after 10 cycles due to neuropathy. She states the neuropathy has not improved since stopping the Oxaliplatin. She states it is numbness and tingling to the bottom of her feet and her toes and her fingertips. She reports she was walking 2 miles most days of the week, but stopped due to the neuropathy. She sleep approximately 5-6 hours at night and then takes a nap daily. She reports she is fatigued, but states it is better since she has been off of chemo. She reports a lot of stress and anxiety through her life. She states she stresses about finances. She reports she does not have a hunger feeling, but eats because she knows she has to eat. She states she cannot digest vegetables so it makes eating healthy hard.     She has been  for 19 years and she has a 17 year old who will be a senior at [employer]. She works full time and is currently working from home.     7 pillars Assessment    Sleep  How many hours of sleep per night? 5-6 hours  Do you have trouble falling asleep, staying asleep or waking up earlier than you need to? yes  Do you have daytime fatigue? yes  Do you need medication for sleep? yes Vistaril   Do you use any supplements or other interventions for sleep? no    Resilience  Rate your current level of stress- high  How do you manage stress?  Prayer, psychology     Purpose  Do you feel you have a vision or a life purpose? Yes    Environment  Any exposures:no known exposures    Spirituality-  Judaism, [facility]    Nutrition   Food allergies or  sensitivities: none  Do you adhere to a particular type of diet? no  Do you have any concerns with your eating habits? no    Exercise  How would you describe your physical activity level? low  Do you work at a sedentary job? yes    Past Medical History  Past Medical History:   Diagnosis Date    Allergy     Asthma     Hyperlipidemia     Hypertension     Lumbago     PONV (postoperative nausea and vomiting)     Pre-diabetes     Skin sensitivity     Vitamin D deficiency       Past Surgical History   Past Surgical History:   Procedure Laterality Date     SECTION      times 1    COLONOSCOPY N/A 10/02/2020    Procedure: COLONOSCOPY;  Surgeon: Ortega Thao MD;  Location: [facility];  Service: Endoscopy;  Laterality: N/A;    DIAGNOSTIC LAPAROSCOPY N/A 2022    Procedure: LAPAROSCOPY, DIAGNOSTIC;  Surgeon: Tee Porter MD;  Location: [facility] OR [address] [place] [address];  Service: General;  Laterality: N/A;  Diag lap and port insertion    DILATION AND CURETTAGE OF UTERUS      ESOPHAGOGASTRODUODENOSCOPY N/A 10/18/2022    Procedure: EGD (ESOPHAGOGASTRODUODENOSCOPY);  Surgeon: CLINT Montanez MD;  Location: [facility];  Service: Endoscopy;  Laterality: N/A;    INSERTION OF TUNNELED CENTRAL VENOUS CATHETER (CVC) WITH SUBCUTANEOUS PORT Right 2022    Procedure: INSERTION, SINGLE LUMEN CATHETER WITH PORT, WITH FLUOROSCOPIC GUIDANCE;  Surgeon: Tee Porter MD;  Location: [address];  Service: General;  Laterality: Right;  Diag lap and port insertion    LAPAROSCOPY  2023    ME LAPS ABD PRTM&OMENTUM DX W/WO SPEC BR/WA SPX    LATERAL INTERNAL ANAL SPHINCTEROTOMY N/A 2020    Procedure: SPHINCTEROTOMY, ANAL, INTERNAL, LATERAL;  Surgeon: Brigido Contreras MD;  Location: [facility] OR [address] [place] [address];  Service: Colon and Rectal;  Laterality: N/A;    tonsillectomy      TONSILLECTOMY        Family History   Family History   Problem Relation Age of Onset    Hypertension Mother     Stroke Father     Hypertension Father      Heart disease Father     Stroke Paternal Grandfather     Colon polyps Sister     Prostate cancer Brother 57        s/p surg only    Breast cancer Maternal Aunt 90        unilat?    Obesity Maternal Aunt     Prostate cancer Maternal Uncle 44    Cancer Other         origin?    Prostate cancer Other 52    Cancer Other         maybe    Heart disease Paternal Aunt     Heart disease Paternal Uncle     Heart disease Paternal Uncle     Heart disease Paternal Uncle       Social History  Social History     Socioeconomic History    Marital status:    Tobacco Use    Smoking status: Never    Smokeless tobacco: Never   Substance and Sexual Activity    Alcohol use: No    Drug use: No    Sexual activity: Yes     Birth control/protection: None      Allergies  Review of patient's allergies indicates:   Allergen Reactions    Other Itching     Tape-sensitive skin    Adhesive Hives and Rash     Okay to use paper tape//patient    Codeine Itching    Percodan [oxycodone hcl-oxycodone-asa] Itching    Vicodin [hydrocodone-acetaminophen] Itching    Cocoa Hives and Other (See Comments)     Agent:  Patient suspects she is allergic to the cocoa bean    Reactions:  Hives, fever    Dilaudid [hydromorphone]     Sunscreen Hives and Other (See Comments)     Reactions:  Hives, fever      Current Medications:    Current Outpatient Medications:     acetaminophen (TYLENOL) 500 MG tablet, Take 1,000 mg by mouth every 6 to 8 hours as needed (pain). , Disp: , Rfl:     aspirin 81 MG Chew, Take 81 mg by mouth nightly., Disp: , Rfl:     atorvastatin (LIPITOR) 20 MG tablet, TAKE 1 TABLET AT BEDTIME (Patient taking differently: Take 20 mg by mouth every evening.), Disp: 90 tablet, Rfl: 2    azilsartan med-chlorthalidone (EDARBYCLOR) 40-25 mg Tab, Take 1 tablet by mouth nightly., Disp: , Rfl:     azithromycin (Z-OPHELIA) 250 MG tablet, , Disp: , Rfl:     budesonide-formoterol 80-4.5 mcg (SYMBICORT) 80-4.5 mcg/actuation HFAA, , Disp: , Rfl:     cetirizine  (ZYRTEC) 10 MG tablet, TAKE 1 TABLET DAILY (Patient taking differently: Take 10 mg by mouth every evening.), Disp: 90 tablet, Rfl: 1    COVID-19 vacc,mRNA,Moderna,-PF (SPIKEVAX, PF,) 100 mcg/0.5 mL Susp, , Disp: , Rfl:     docusate sodium (COLACE) 100 MG capsule, Take 200 mg by mouth 2 (two) times daily., Disp: , Rfl:     estradiol-norethindrone (COMBIPATCH) 0.05-0.14 mg/24 hr, , Disp: , Rfl:     hydrocortisone (ANUSOL-HC) 25 mg suppository, , Disp: , Rfl:     hydrocortisone 2.5 % cream, , Disp: , Rfl:     ibuprofen (ADVIL,MOTRIN) 400 MG tablet, Take 1 tablet (400 mg total) by mouth every 6 (six) hours as needed for Other (pain). Alternate with tylenol, Disp: , Rfl:     ibuprofen (ADVIL,MOTRIN) 600 MG tablet, Take 1 tablet (600 mg total) by mouth every 6 (six) hours as needed for Pain., Disp: 20 tablet, Rfl: 0    LIDOcaine (LIDODERM) 5 %, , Disp: , Rfl:     LINZESS 72 mcg Cap capsule, TAKE 1 CAPSULE BEFORE BREAKFAST, Disp: 30 capsule, Rfl: 11    multivitamin (THERAGRAN) per tablet, Take 1 tablet by mouth once daily. Every day, Disp: , Rfl:     omega-3 fatty acids/fish oil (FISH OIL-OMEGA-3 FATTY ACIDS) 300-1,000 mg capsule, Take by mouth 2 (two) times a day., Disp: , Rfl:     pantoprazole (PROTONIX) 40 MG tablet, TAKE ONE TABLET BY MOUTH TWICE DAILY BEFORE breakfast and dinner, Disp: , Rfl:     traMADoL (ULTRAM) 50 mg tablet, Take 1 tablet (50 mg total) by mouth every 4 (four) hours as needed for Pain., Disp: 15 tablet, Rfl: 0    vitamin D3-folic acid 5,000 unit- 1 mg Tab, Take by mouth., Disp: , Rfl:     vitamin E 100 UNIT capsule, Take by mouth once daily., Disp: , Rfl:     zinc gluconate 50 mg tablet, Take 50 mg by mouth once daily., Disp: , Rfl:   No current facility-administered medications for this visit.    Facility-Administered Medications Ordered in Other Visits:     0.9%  NaCl infusion, , Intravenous, Continuous, Tonia Woods NP, Last Rate: 70 mL/hr at 06/29/20 0603, New Bag at 06/29/20 0603     "mupirocin 2 % ointment, , Nasal, On Call Procedure, Tonia Woods NP, Given at 06/29/20 0603     Review of Systems  Review of Systems   Constitutional:  Positive for malaise/fatigue.   HENT: Negative.     Eyes: Negative.    Respiratory: Negative.     Cardiovascular: Negative.    Gastrointestinal: Negative.    Genitourinary: Negative.    Musculoskeletal:  Positive for back pain.   Skin: Negative.    Neurological:  Positive for tingling.   Endo/Heme/Allergies: Negative.    Psychiatric/Behavioral:  The patient is nervous/anxious and has insomnia.       Physical Exam      Vitals:    07/11/23 1303   BP: 124/71   Pulse: 70   Temp: 97.7 °F (36.5 °C)   TempSrc: Temporal   SpO2: 97%   Weight: 72.2 kg (159 lb 1.6 oz)   Height: 5' 2" (1.575 m)     Body mass index is 29.1 kg/m².  Physical Exam  Vitals reviewed.   Constitutional:       Appearance: Normal appearance.   Neurological:      Mental Status: She is alert.   Psychiatric:         Mood and Affect: Mood normal.         Behavior: Behavior normal.      ASSESSMENT :  1. Insomnia, unspecified type    2. Adjustment disorder with mixed anxiety and depressed mood    3. Gastric adenocarcinoma    4. Chemotherapy-induced peripheral neuropathy    5. Chemotherapy-induced fatigue      PLAN:  Reviewed all information discussed at today's visit and all questions were answered.    Counseled on healthy lifestyle and behavior modifications: Increase physical activity with the goal of 30 minutes 5 days a week.   Referral to Acupuncture  Counseled on sleep hygiene: Recommended insight timer  Melatonin 3-5 mg nightly as needed for sleep  Continue to see Oncology Psychology  I discussed and recommended the following support services:  Meek Chi and Yoga   Music and Relaxation therapy   Meditation    Follow up with Integrative Services in 3 months     I spent a total of 50 minutes on the day of the visit.This includes face to face time and non-face to face time preparing to see the patient " (eg, review of tests), obtaining and/or reviewing separately obtained history, documenting clinical information in the electronic or other health record, independently interpreting results and communicating results to the patient/family/caregiver, or care coordinator.

## 2023-07-11 NOTE — PROGRESS NOTES
PSYCHO-ONCOLOGY NOTE/ Individual Psychotherapy     Date: 7/11/2023   Site:  [facility]      Therapeutic Intervention: Met with patient.  Outpatient - Insight oriented psychotherapy 60 min - CPT code 36424 and Outpatient - Supportive psychotherapy 60 min - CPT Code 80147    This includes face to face time and non-face to face time preparing to see the patient, obtaining and/or reviewing separately obtained history, documenting clinical information in the electronic or other health record, independently interpreting results and communicating results to the patient/family/caregiver, or care coordinator.      Patient was last seen by me on 6/15/2023    Problem list  Patient Active Problem List   Diagnosis    HTN (hypertension)    Asthma    Pre-diabetes    Kidney stone    Chronic lumbar pain    Hyperlipemia    Anal fissure    Iron deficiency anemia, unspecified    OB + stool    Dysphagia    Gastric adenocarcinoma       Chief complaint/reason for encounter: depression, anxiety, and interpersonal     Met with patient to evaluate psychosocial adaptation to diagnosis/treatment of stage IV gastric adenocarcinoma    Current Medications  Current Outpatient Medications   Medication    acetaminophen (TYLENOL) 500 MG tablet    aspirin 81 MG Chew    atorvastatin (LIPITOR) 20 MG tablet    azilsartan med-chlorthalidone (EDARBYCLOR) 40-25 mg Tab    azithromycin (Z-OPHELIA) 250 MG tablet    budesonide-formoterol 80-4.5 mcg (SYMBICORT) 80-4.5 mcg/actuation HFAA    cetirizine (ZYRTEC) 10 MG tablet    COVID-19 vacc,mRNA,Moderna,-PF (SPIKEVAX, PF,) 100 mcg/0.5 mL Susp    docusate sodium (COLACE) 100 MG capsule    estradiol-norethindrone (COMBIPATCH) 0.05-0.14 mg/24 hr    hydrocortisone (ANUSOL-HC) 25 mg suppository    hydrocortisone 2.5 % cream    ibuprofen (ADVIL,MOTRIN) 400 MG tablet    ibuprofen (ADVIL,MOTRIN) 600 MG tablet    LIDOcaine (LIDODERM) 5 %    LINZESS 72 mcg Cap capsule    multivitamin (THERAGRAN) per tablet    omega-3 fatty  acids/fish oil (FISH OIL-OMEGA-3 FATTY ACIDS) 300-1,000 mg capsule    pantoprazole (PROTONIX) 40 MG tablet    traMADoL (ULTRAM) 50 mg tablet    vitamin D3-folic acid 5,000 unit- 1 mg Tab    vitamin E 100 UNIT capsule    zinc gluconate 50 mg tablet     No current facility-administered medications for this visit.     Facility-Administered Medications Ordered in Other Visits   Medication Frequency    0.9%  NaCl infusion Continuous    mupirocin 2 % ointment On Call Procedure       ONCOLOGY HISTORY  Oncology History    No history exists.       Objective:  Felicia Ruffin arrived early for the session. Ms. Ruffin was independently ambulatory at the time of session. The patient was fully cooperative throughout the session.  Appearance: age appropriate, casually  dressed, well groomed  Behavior/Cooperation: friendly and cooperative  Speech: normal in rate, volume, and tone and appropriate quality, quantity and organization of sentences  Mood: anxious  Affect: mood congruent and appropriate  Thought Process: goal-directed, logical  Thought Content: normal,  No delusions or paranoia; did not appear to be responding to internal stimuli during the session  Orientation: grossly intact  Memory: grossly intact  Attention Span/Concentration: Attends to session without distraction; reports no difficulty  Fund of Knowledge: above average  Estimate of Intelligence: above average from verbal skills and history  Cognition: grossly intact  Insight: patient has awareness of illness; fair insight into own behavior and behavior of others  Judgment: the patient's behavior is adequate to circumstances    NCCN Distress thermometer:   DISTRESS SCREENING 1/4/2023   Distress Score 7        Interval history and content of current session: Discussed  recent visit to [facility]/procedure and current adaptation to disease and treatment status. Reports to be coping with moderate difficulty. Assessed cognitive response, paying particular attention to  negative intrusive thoughts of a persistent and detrimental nature. Thoughts of this type are in evidence with moderate distress and are associated w/ preparing son for future and maintaining relationship w/ sister. Provided cognitive behavioral therapy to address negative cognitions. Provided additional psychotherapeutic support and processing of complex emotions associated w/ sibling relationship. Identified and evaluated psychosocial and environmental stressors secondary to diagnosis and treatment.  Examined proactive behaviors that may be implemented to minimize or ameliorate psychosocial stressors secondary to diagnosis and ongoing treatment.     Risk parameters:   Patient reports no suicidal ideation  Patient reports no homicidal ideation  Patient reports no self-injurious behavior  Patient reports no violent behavior   Safety needs:  None at this time      Verbal deficits: None     Patient's response to intervention:The patient's response to intervention is accepting, motivated.     Progress toward goals and other mental status changes:  The patient's progress toward goals is good.      Progress to date:Progress - Ongoing, but Slow      Goals from last visit: Met        Patient Strengths: verbal, intelligent, successful, good social support, good insight, commitment to wellness, strong iron, strong cultural traditions      Treatment Plan:individual psychotherapy  Target symptoms: anxiety , adjustment  Why chosen therapy is appropriate versus another modality: relevant to diagnosis, patient responds to this modality, evidence based practice  Outcome monitoring methods: self-report, observation, checklist/rating scale  Therapeutic intervention type: insight oriented psychotherapy, supportive psychotherapy  Prognosis: Good     Behavioral goals:               Social engagement: continued, adaptive boundaries regarding control behaviors, interpersonal boundaries              Therapy: CBT, time based pacing,  negotiation of new role as caregivee, adaptation to scheduling strategies    Return to clinic: 1 month     Length of Service (minutes direct face-to-face contact): 60    Diagnosis:     ICD-10-CM ICD-9-CM   1. Adjustment disorder with mixed anxiety and depressed mood  F43.23 309.28   2. Gastric adenocarcinoma  C16.9 151.9                [place] License #[id number]  [place] License #[id number]

## 2023-07-19 ENCOUNTER — TELEPHONE (OUTPATIENT)
Dept: HEMATOLOGY/ONCOLOGY | Facility: CLINIC | Age: 58
End: 2023-07-19
Payer: OTHER GOVERNMENT

## 2023-07-19 ENCOUNTER — PATIENT MESSAGE (OUTPATIENT)
Dept: HEMATOLOGY/ONCOLOGY | Facility: CLINIC | Age: 58
End: 2023-07-19
Payer: OTHER GOVERNMENT

## 2023-07-19 NOTE — TELEPHONE ENCOUNTER
Spoke with the patient to inform her that her acup referral was denied by insurance. She denied ability to do cash pay for appts at this time. I suggested her to call her insurance to find out with Dx codes if any, will they cover and let us know! She voiced she would call and find out. I also informed her about our FA department and soul reach out to Nicolas for assistance.

## 2023-07-21 ENCOUNTER — PATIENT MESSAGE (OUTPATIENT)
Dept: HEMATOLOGY/ONCOLOGY | Facility: CLINIC | Age: 58
End: 2023-07-21
Payer: OTHER GOVERNMENT

## 2023-08-08 ENCOUNTER — OFFICE VISIT (OUTPATIENT)
Dept: PSYCHIATRY | Facility: CLINIC | Age: 58
End: 2023-08-08
Payer: OTHER GOVERNMENT

## 2023-08-08 DIAGNOSIS — C16.9 GASTRIC ADENOCARCINOMA: ICD-10-CM

## 2023-08-08 DIAGNOSIS — F41.9 ANXIETY DISORDER, UNSPECIFIED TYPE: ICD-10-CM

## 2023-08-08 DIAGNOSIS — F43.23 ADJUSTMENT DISORDER WITH MIXED ANXIETY AND DEPRESSED MOOD: Primary | ICD-10-CM

## 2023-08-08 PROCEDURE — 99999 PR PBB SHADOW E&M-EST. PATIENT-LVL I: CPT | Mod: PBBFAC,,, | Performed by: PSYCHOLOGIST

## 2023-08-08 PROCEDURE — 90837 PR PSYCHOTHERAPY W/PATIENT, 60 MIN: ICD-10-PCS | Mod: ,,, | Performed by: PSYCHOLOGIST

## 2023-08-08 PROCEDURE — 90837 PSYTX W PT 60 MINUTES: CPT | Mod: ,,, | Performed by: PSYCHOLOGIST

## 2023-08-08 PROCEDURE — 99211 OFF/OP EST MAY X REQ PHY/QHP: CPT | Mod: PBBFAC,PN | Performed by: PSYCHOLOGIST

## 2023-08-08 PROCEDURE — 99999 PR PBB SHADOW E&M-EST. PATIENT-LVL I: ICD-10-PCS | Mod: PBBFAC,,, | Performed by: PSYCHOLOGIST

## 2023-08-08 NOTE — PROGRESS NOTES
PSYCHO-ONCOLOGY NOTE/ Individual Psychotherapy     Date: 8/8/2023   [facility]      Therapeutic Intervention: Met with patient.  Outpatient - Insight oriented psychotherapy 60 min - CPT code 67528 and Outpatient - Supportive psychotherapy 60 min - CPT Code 51186    This includes face to face time and non-face to face time preparing to see the patient, obtaining and/or reviewing separately obtained history, documenting clinical information in the electronic or other health record, independently interpreting results and communicating results to the patient/family/caregiver, or care coordinator.      Patient was last seen by me on 7/11/2023    Problem list  Patient Active Problem List   Diagnosis    HTN (hypertension)    Asthma    Pre-diabetes    Kidney stone    Chronic lumbar pain    Hyperlipemia    Anal fissure    Iron deficiency anemia, unspecified    OB + stool    Dysphagia    Gastric adenocarcinoma    Chemotherapy-induced peripheral neuropathy    Chemotherapy-induced fatigue    Insomnia       Chief complaint/reason for encounter: depression, anxiety, and interpersonal     Met with patient to evaluate psychosocial adaptation to diagnosis/treatment of stage IV gastric adenocarcinoma    Current Medications  Current Outpatient Medications   Medication    acetaminophen (TYLENOL) 500 MG tablet    aspirin 81 MG Chew    atorvastatin (LIPITOR) 20 MG tablet    azilsartan med-chlorthalidone (EDARBYCLOR) 40-25 mg Tab    budesonide-formoterol 80-4.5 mcg (SYMBICORT) 80-4.5 mcg/actuation HFAA    cetirizine (ZYRTEC) 10 MG tablet    COVID-19 vacc,mRNA,Moderna,-PF (SPIKEVAX, PF,) 100 mcg/0.5 mL Susp    docusate sodium (COLACE) 100 MG capsule    estradiol-norethindrone (COMBIPATCH) 0.05-0.14 mg/24 hr    hydrocortisone (ANUSOL-HC) 25 mg suppository    hydrocortisone 2.5 % cream    ibuprofen (ADVIL,MOTRIN) 400 MG tablet    ibuprofen (ADVIL,MOTRIN) 600 MG tablet    LIDOcaine (LIDODERM) 5 %    LINZESS 72 mcg Cap capsule     multivitamin (THERAGRAN) per tablet    omega-3 fatty acids/fish oil (FISH OIL-OMEGA-3 FATTY ACIDS) 300-1,000 mg capsule    pantoprazole (PROTONIX) 40 MG tablet    traMADoL (ULTRAM) 50 mg tablet    vitamin D3-folic acid 5,000 unit- 1 mg Tab    vitamin E 100 UNIT capsule    zinc gluconate 50 mg tablet     No current facility-administered medications for this visit.     Facility-Administered Medications Ordered in Other Visits   Medication Frequency    0.9%  NaCl infusion Continuous    mupirocin 2 % ointment On Call Procedure       ONCOLOGY HISTORY  Oncology History    No history exists.       Objective:  Felicia Ruffin arrived promptly for the session. Ms. Ruffin was independently ambulatory at the time of session. The patient was cooperative throughout the session.  Appearance: age appropriate, casually  dressed, well groomed  Behavior/Cooperation: friendly and cooperative  Speech: pressured  Mood: anxious  Affect: anxious and mood congruent  Thought Process: goal-directed, logical  Thought Content: normal,  No delusions or paranoia; did not appear to be responding to internal stimuli during the session  Orientation: grossly intact  Memory: grossly intact  Attention Span/Concentration: Attends to session without distraction; reports no difficulty  Fund of Knowledge: average  Estimate of Intelligence: above average from verbal skills and history  Cognition: grossly intact  Insight: patient has awareness of illness; fair insight into own behavior and behavior of others  Judgment: the patient's behavior is adequate to circumstances    NCCN Distress thermometer:   DISTRESS SCREENING 1/4/2023   Distress Score 7        Interval history and content of current session: Discussed adaptation to tx, imaging results, and ambiguity regarding tx protocol (encouraged follow up w/ oncologist regarding protocol). Reports to be coping with significant difficulty. Assessed cognitive response, paying particular attention to negative  "intrusive thoughts of a persistent and detrimental nature. Thoughts of this type are in evidence with moderate distress and are further associated w/ loss of sense of control and self-proclaimed "impatience." Provided cognitive behavioral therapy to address negative cognitions.. Further encouraged application of scheduled worry time as previously explored. Identified and evaluated psychosocial and environmental stressors secondary to diagnosis and treatment.  Examined proactive behaviors that may be implemented to minimize or ameliorate psychosocial stressors secondary to diagnosis and ongoing treatment.     Risk parameters:   Patient reports no suicidal ideation  Patient reports no homicidal ideation  Patient reports no self-injurious behavior  Patient reports no violent behavior   Safety needs:  None at this time      Verbal deficits: None     Patient's response to intervention:The patient's response to intervention is accepting, motivated.     Progress toward goals and other mental status changes:  The patient's progress toward goals is fair , good.      Progress to date:Progress as Expected and Progress - Ongoing, but Slow      Goals from last visit: Attempted, partially met        Patient Strengths: verbal, intelligent, successful, good social support, good insight, commitment to wellness, strong iron, strong cultural traditions        Treatment Plan:individual psychotherapy  Target symptoms: anxiety , adjustment  Why chosen therapy is appropriate versus another modality: relevant to diagnosis, patient responds to this modality, evidence based practice  Outcome monitoring methods: self-report, observation, checklist/rating scale  Therapeutic intervention type: insight oriented psychotherapy, supportive psychotherapy  Prognosis: Good                Behavioral goals:               Social engagement: continued, adaptive boundaries regarding control behaviors, interpersonal boundaries              Therapy: CBT, " time based pacing, negotiation of new role as caregivee, adaptation to scheduling strategies    Return to clinic: Pt referred to incoming [facility] Psychiatry fellows upon departure of this provider      Length of Service (minutes direct face-to-face contact): 60    Diagnosis:     ICD-10-CM ICD-9-CM   1. Adjustment disorder with mixed anxiety and depressed mood  F43.23 309.28   2. Gastric adenocarcinoma  C16.9 151.9   3. Anxiety disorder, unspecified type  F41.9 300.00                Stefan Colon Psy.D.  [place] License #[id number]  [place] License #[id number]

## 2023-09-14 ENCOUNTER — PATIENT MESSAGE (OUTPATIENT)
Dept: SURGERY | Facility: CLINIC | Age: 58
End: 2023-09-14
Payer: OTHER GOVERNMENT

## 2023-09-19 ENCOUNTER — OFFICE VISIT (OUTPATIENT)
Dept: SURGERY | Facility: CLINIC | Age: 58
End: 2023-09-19
Payer: OTHER GOVERNMENT

## 2023-09-19 VITALS
DIASTOLIC BLOOD PRESSURE: 76 MMHG | BODY MASS INDEX: 27.79 KG/M2 | WEIGHT: 151 LBS | HEART RATE: 67 BPM | RESPIRATION RATE: 16 BRPM | SYSTOLIC BLOOD PRESSURE: 140 MMHG | HEIGHT: 62 IN

## 2023-09-19 DIAGNOSIS — K59.04 CHRONIC IDIOPATHIC CONSTIPATION: ICD-10-CM

## 2023-09-19 DIAGNOSIS — K60.2 ANAL FISSURE: Primary | ICD-10-CM

## 2023-09-19 DIAGNOSIS — K64.4 INTERNAL AND EXTERNAL BLEEDING HEMORRHOIDS: ICD-10-CM

## 2023-09-19 DIAGNOSIS — K64.8 INTERNAL AND EXTERNAL BLEEDING HEMORRHOIDS: ICD-10-CM

## 2023-09-19 PROCEDURE — 99999 PR PBB SHADOW E&M-EST. PATIENT-LVL IV: ICD-10-PCS | Mod: PBBFAC,,, | Performed by: COLON & RECTAL SURGERY

## 2023-09-19 PROCEDURE — 99214 OFFICE O/P EST MOD 30 MIN: CPT | Mod: PBBFAC | Performed by: COLON & RECTAL SURGERY

## 2023-09-19 PROCEDURE — 99213 PR OFFICE/OUTPT VISIT, EST, LEVL III, 20-29 MIN: ICD-10-PCS | Mod: S$PBB,,, | Performed by: COLON & RECTAL SURGERY

## 2023-09-19 PROCEDURE — 99213 OFFICE O/P EST LOW 20 MIN: CPT | Mod: S$PBB,,, | Performed by: COLON & RECTAL SURGERY

## 2023-09-19 PROCEDURE — 99999 PR PBB SHADOW E&M-EST. PATIENT-LVL IV: CPT | Mod: PBBFAC,,, | Performed by: COLON & RECTAL SURGERY

## 2023-09-19 NOTE — TELEPHONE ENCOUNTER
----- Message from Farideh Dexter sent at 3/6/2020  9:11 AM CST -----  Contact: pt [phone]  Pt is returning call from [facility]   Attending

## 2023-09-19 NOTE — H&P (VIEW-ONLY)
CRS Office Followup    SUBJECTIVE:     Chief Complaint:  Hemorrhoids    History:   She has bowel movements daily and spends approximately 20 min on the toilet for each bowel movement.  No family history of colon rectal cancer.  No family history of inflammatory bowel disease.  No episodes of fecal incontinence. No past anorectal surgeries.    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  Underwent limited sphincterotomy on 06/29/2020.    07/05/2022:  Presents for evaluation for hemorrhoidal disease.  She states that she has hemorrhoidal swelling causing difficulty with defecation.  She is having daily bowel movements and spending over 1 hour on the toilet for each bowel movement.  Main complaint is swelling and prolapse of tissue.  She is currently taking Linzess 145 mcg but only taking it once per week as a result is in significant diarrhea and frequency causing her to be unable to leave her house.  She takes MiraLax daily as well as four stool softeners daily.    9/18/23:  Diagnosed with stage IV gastric cancer in October 2022.  She has received chemotherapy and is doing well.  With her chemotherapy, she has worsening constipation.    She is currently taking Linzess 145 mcg per day as well as intermittent MiraLax.  Bowel movements are often difficult to pass and painful.  She feels bulging of the external hemorrhoids.  She is spending a prolonged amount of time sitting on the toilet.  With the Linzess, she is having daily bowel movements.      10/2/2020  Impression:           - One 7 mm polyp in the sigmoid colon, removed with                         a hot biopsy forceps. Resected and retrieved.  --> hyperplastic polyp                        - Three 2 to 3 mm polyps in the sigmoid colon and                         in the transverse colon. Treated with hot biopsy                         forceps.                         - Non-bleeding external and internal hemorrhoids.                     "     - The examination was otherwise normal.                         - The distal rectum and anal verge are normal on                         retroflexion view.                         - Congested mucosa in the transverse colon.                         Biopsied.  --> normal pathology        Review of Systems:  Review of Systems   Constitutional:  Negative for chills, diaphoresis, fever, malaise/fatigue and weight loss.   HENT:  Negative for congestion.    Respiratory:  Negative for shortness of breath.    Cardiovascular:  Negative for chest pain and leg swelling.   Gastrointestinal:  Positive for constipation. Negative for abdominal pain, blood in stool, nausea and vomiting.   Genitourinary:  Negative for dysuria.   Musculoskeletal:  Negative for back pain and myalgias.   Skin:  Negative for rash.   Neurological:  Negative for dizziness and weakness.   Endo/Heme/Allergies:  Does not bruise/bleed easily.   Psychiatric/Behavioral:  Negative for depression.        OBJECTIVE:     Vital Signs (Most Recent)  BP (!) 140/76 (BP Location: Left arm, Patient Position: Sitting, BP Method: Large (Automatic))   Pulse 67   Resp 16   Ht 5' 2" (1.575 m)   Wt 68.5 kg (151 lb 0.2 oz)   LMP 07/01/2017   BMI 27.62 kg/m²     Physical Exam:  General: White female in no distress   Respiratory: respirations are even and unlabored  Cardiac: regular rate  Abdomen:  Soft, nontender, no masses  Extremities: Warm dry and intact  Anorectal:  External exam with multiple perianal mixed internal and external hemorrhoids.  Digital exam performed with congested internal hemorrhoids palpated.  Tender on exam.     Labs:  H&H 14 and 43. TSH normal.    Imaging:    PETCT 6/18/2023:  1.  New hypermetabolic focus left upper lobe without an underlying CT correlate on this study, nor on the earlier CT of 03/27/2023. This could potentially represent an occult inflammatory process but the possibility of a small early metastasis, while considered unlikely, " is not excluded. Short-term monitoring is advised.   2.  The previously noted findings of a suspicious right lower lobe cavitary pulmonary nodule shows what is likely low-grade uptake, and appears to be grossly stable from immediate prior study though it had been previously noted as possibly having slightly increased in size from earlier studies. Advise continued close monitoring for this finding.   3.  Increased uptake in the site of the patient's known gastric tumor that is new since the immediate prior PET/CT of 01/05/2023 but is in a similar location as site of maximum of uptake in the known site of tumor on the earlier PET/CT of 11/02/2022. This could represent either an inflammatory process involving the site of tumor or increased viability of the primary tumor itself. Correlation with endoscopy may be helpful. This appears to be associated with a region of also mild extragastric extension that was present before. Morphologically, the findings appear to be similar to that on 03/27/2023.   4.  No hypermetabolic peritoneal implants.      ASSESSMENT/PLAN:     Diagnoses and all orders for this visit:    Anal fissure    Internal and external bleeding hemorrhoids    Chronic idiopathic constipation          57 y.o. female post op from [facility] on 6/29/20.  She continues to have difficulty with significant constipation now worsened with chemotherapy. She is having daily BM with Linzess 145mcg per day.  She presents today with a clinical anal fissure as well as mixed internal and external hemorrhoids.  Recommended adding miralax to keep her BM soft to prevent pain and stretching of the anus.  She is currently using Nitro cream with modest benefit.  Discussed repeat sphincterotomy and possible hemorrhoidectomy vs. Botox.  With the more invasive procedure, she would ideally come off of chemotherapy, which would put her at risk of blossoming of her malignancy.      In order to keep on her chemotherapy regimen, recommended  starting with Botox and EUA in endoscopy.   Schedulers have been messaged. Additionally, recommended daily miralax in addition to the Linzess.      MARICRUZ Contreras MD, FACS  Staff Surgeon  Colon & Rectal Surgery

## 2023-09-19 NOTE — PROGRESS NOTES
CRS Office Followup    SUBJECTIVE:     Chief Complaint:  Hemorrhoids    History:   She has bowel movements daily and spends approximately 20 min on the toilet for each bowel movement.  No family history of colon rectal cancer.  No family history of inflammatory bowel disease.  No episodes of fecal incontinence. No past anorectal surgeries.    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  Underwent limited sphincterotomy on 06/29/2020.    07/05/2022:  Presents for evaluation for hemorrhoidal disease.  She states that she has hemorrhoidal swelling causing difficulty with defecation.  She is having daily bowel movements and spending over 1 hour on the toilet for each bowel movement.  Main complaint is swelling and prolapse of tissue.  She is currently taking Linzess 145 mcg but only taking it once per week as a result is in significant diarrhea and frequency causing her to be unable to leave her house.  She takes MiraLax daily as well as four stool softeners daily.    9/18/23:  Diagnosed with stage IV gastric cancer in October 2022.  She has received chemotherapy and is doing well.  With her chemotherapy, she has worsening constipation.    She is currently taking Linzess 145 mcg per day as well as intermittent MiraLax.  Bowel movements are often difficult to pass and painful.  She feels bulging of the external hemorrhoids.  She is spending a prolonged amount of time sitting on the toilet.  With the Linzess, she is having daily bowel movements.      10/2/2020  Impression:           - One 7 mm polyp in the sigmoid colon, removed with                         a hot biopsy forceps. Resected and retrieved.  --> hyperplastic polyp                        - Three 2 to 3 mm polyps in the sigmoid colon and                         in the transverse colon. Treated with hot biopsy                         forceps.                         - Non-bleeding external and internal hemorrhoids.                     "     - The examination was otherwise normal.                         - The distal rectum and anal verge are normal on                         retroflexion view.                         - Congested mucosa in the transverse colon.                         Biopsied.  --> normal pathology        Review of Systems:  Review of Systems   Constitutional:  Negative for chills, diaphoresis, fever, malaise/fatigue and weight loss.   HENT:  Negative for congestion.    Respiratory:  Negative for shortness of breath.    Cardiovascular:  Negative for chest pain and leg swelling.   Gastrointestinal:  Positive for constipation. Negative for abdominal pain, blood in stool, nausea and vomiting.   Genitourinary:  Negative for dysuria.   Musculoskeletal:  Negative for back pain and myalgias.   Skin:  Negative for rash.   Neurological:  Negative for dizziness and weakness.   Endo/Heme/Allergies:  Does not bruise/bleed easily.   Psychiatric/Behavioral:  Negative for depression.        OBJECTIVE:     Vital Signs (Most Recent)  BP (!) 140/76 (BP Location: Left arm, Patient Position: Sitting, BP Method: Large (Automatic))   Pulse 67   Resp 16   Ht 5' 2" (1.575 m)   Wt 68.5 kg (151 lb 0.2 oz)   LMP 07/01/2017   BMI 27.62 kg/m²     Physical Exam:  General: White female in no distress   Respiratory: respirations are even and unlabored  Cardiac: regular rate  Abdomen:  Soft, nontender, no masses  Extremities: Warm dry and intact  Anorectal:  External exam with multiple perianal mixed internal and external hemorrhoids.  Digital exam performed with congested internal hemorrhoids palpated.  Tender on exam.     Labs:  H&H 14 and 43. TSH normal.    Imaging:    PETCT 6/18/2023:  1.  New hypermetabolic focus left upper lobe without an underlying CT correlate on this study, nor on the earlier CT of 03/27/2023. This could potentially represent an occult inflammatory process but the possibility of a small early metastasis, while considered unlikely, " is not excluded. Short-term monitoring is advised.   2.  The previously noted findings of a suspicious right lower lobe cavitary pulmonary nodule shows what is likely low-grade uptake, and appears to be grossly stable from immediate prior study though it had been previously noted as possibly having slightly increased in size from earlier studies. Advise continued close monitoring for this finding.   3.  Increased uptake in the site of the patient's known gastric tumor that is new since the immediate prior PET/CT of 01/05/2023 but is in a similar location as site of maximum of uptake in the known site of tumor on the earlier PET/CT of 11/02/2022. This could represent either an inflammatory process involving the site of tumor or increased viability of the primary tumor itself. Correlation with endoscopy may be helpful. This appears to be associated with a region of also mild extragastric extension that was present before. Morphologically, the findings appear to be similar to that on 03/27/2023.   4.  No hypermetabolic peritoneal implants.      ASSESSMENT/PLAN:     Diagnoses and all orders for this visit:    Anal fissure    Internal and external bleeding hemorrhoids    Chronic idiopathic constipation          57 y.o. [facility].  She continues to have difficulty with significant constipation now worsened with chemotherapy. She is having daily BM with Linzess 145mcg per day.  She presents today with a clinical anal fissure as well as mixed internal and external hemorrhoids.  Recommended adding miralax to keep her BM soft to prevent pain and stretching of the anus.  She is currently using Nitro cream with modest benefit.  Discussed repeat sphincterotomy and possible hemorrhoidectomy vs. Botox.  With the more invasive procedure, she would ideally come off of chemotherapy, which would put her at risk of blossoming of her malignancy.      In order to keep on her chemotherapy regimen, recommended  starting with Botox and EUA in endoscopy.   Schedulers have been messaged. Additionally, recommended daily miralax in addition to the Linzess.      MARICRUZ Contreras MD, FACS  Staff Surgeon  Colon & Rectal Surgery

## 2023-09-20 ENCOUNTER — PATIENT MESSAGE (OUTPATIENT)
Dept: SURGERY | Facility: CLINIC | Age: 58
End: 2023-09-20
Payer: OTHER GOVERNMENT

## 2023-09-20 NOTE — PROGRESS NOTES
PSYCHO-ONCOLOGY INTAKE    Diagnostic Interview - CPT 41216    Date: 9/21/2023  Site: [place], [place]    Evaluation Length (direct face-to-face time):  1 hour 20 minutes    This includes face to face time and non-face to face time preparing to see the patient, obtaining and/or reviewing separately obtained history, documenting clinical information in the electronic or other health record, independently interpreting results and communicating results to the patient/family/caregiver, or care coordinator.     Referral Source: Tee Porter MD   Oncologist: Anya Mims ([facility])   PCP: Zachary Barksdale MD    Clinical status of patient: Outpatient    Felicia Ruffin, a 57 y.o. female, seen for initial evaluation visit.    Felicia Ruffin reviewed and agreed to informed consent and the limits of confidentiality.    Chief complaint/reason for encounter: adjustment to illness, depression, anxiety, and sleep    Medical/Surgical History:    Patient Active Problem List   Diagnosis    HTN (hypertension)    Asthma    Pre-diabetes    Kidney stone    Chronic lumbar pain    Hyperlipemia    Anal fissure    Iron deficiency anemia, unspecified    OB + stool    Dysphagia    Gastric adenocarcinoma    Chemotherapy-induced peripheral neuropathy    Chemotherapy-induced fatigue    Insomnia       Health Behaviors:       ETOH Use: No      Tobacco Use: No   Illicit Drug Use:  No     Prescription Misuse:No   Caffeine: minimal   Exercise:The patient maintains a regular, healthy exercise program. The patient engages in intermittent activity (PT  2x/week,  2x/week)   Firearms:  Yes, they are not loaded and the ammunition is stored separately from the firearms   Advanced directives:No     Family History:   Psychiatric illness: Yes, her son has Asperberger's      Alcohol/Drug Abuse: Yes     Suicide: No      Past Psychiatric History:   Inpatient treatment: No     Outpatient treatment: Yes, brief couples counseling in 2007, seeing   Tworek since November 2022    Prior substance abuse treatment: No     Suicide Attempts: No     Psychotropic Medications:  Current: none       Past: none    Current medications as per below, allergies reviewed in chart.    Current Outpatient Medications   Medication    acetaminophen (TYLENOL) 500 MG tablet    aspirin 81 MG Chew    atorvastatin (LIPITOR) 20 MG tablet    azilsartan med-chlorthalidone (EDARBYCLOR) 40-25 mg Tab    budesonide-formoterol 80-4.5 mcg (SYMBICORT) 80-4.5 mcg/actuation HFAA    cetirizine (ZYRTEC) 10 MG tablet    COVID-19 vacc,mRNA,Moderna,-PF (SPIKEVAX, PF,) 100 mcg/0.5 mL Susp    docusate sodium (COLACE) 100 MG capsule    estradiol-norethindrone (COMBIPATCH) 0.05-0.14 mg/24 hr    hydrocortisone (ANUSOL-HC) 25 mg suppository    hydrocortisone 2.5 % cream    ibuprofen (ADVIL,MOTRIN) 400 MG tablet    ibuprofen (ADVIL,MOTRIN) 600 MG tablet    LIDOcaine (LIDODERM) 5 %    LINZESS 72 mcg Cap capsule    multivitamin (THERAGRAN) per tablet    omega-3 fatty acids/fish oil (FISH OIL-OMEGA-3 FATTY ACIDS) 300-1,000 mg capsule    pantoprazole (PROTONIX) 40 MG tablet    traMADoL (ULTRAM) 50 mg tablet    vitamin D3-folic acid 5,000 unit- 1 mg Tab    vitamin E 100 UNIT capsule    zinc gluconate 50 mg tablet     No current facility-administered medications for this visit.     Facility-Administered Medications Ordered in Other Visits   Medication Frequency    0.9%  NaCl infusion Continuous    mupirocin 2 % ointment On Call Procedure              Social situation/Stressors: Felicia Ruffin lives with her  and son (17 years old) in [place].  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 She has been in her job for 37 years.    Felicia Ruffin has been  19 years and has 1 child.  Her partner is Hilario. The patient reports adequate social support.  Felicia Ruffin is an active member of the [employer].  Felicia Ruffin's hobbies include crafting and baking. She previously  enjoyed graphic design and yard work but she is unable to engage in these activities due to cancer-related neuropathy.   Additional stressors: confusion/communication difficulties with providers about her prognosis/treatment plan, the condition of her home, financial (concerns over her job stability when her boss retires), family health problems    Strengths:Able to vocalize needs, Values and traditions, and Setting and pursuing goals, hopes, dreams, aspirations  Liabilities: Complicated medical illness, Lack of social supports, and Financial strain    Current Evaluation:     Mental Status Exam: Felicia Ruffin arrived promptly for the assessment session.  The patient was fully cooperative throughout the interview and was an adequate historian   Appearance: age appropriate, appropriately  dressed, adequately  groomed  Behavior/Cooperation: friendly and cooperative  Speech: normal in rate, volume, and tone  Mood: euthymic, irritable  Affect: mood congruent  Thought Process: goal-directed, logical  Thought Content: normal, no suicidality, no homicidality, delusions, or paranoia;did not appear to be responding to internal stimuli during the interview.   Orientation: grossly intact  Memory: grossly intact  Attention Span/Concentration: Attends to interview without distraction; reports subjective difficulty  Fund of Knowledge: average  Estimate of Intelligence: average from verbal skills and history  Cognition: grossly intact  Insight: patient has awareness of illness; good insight into own behavior and behavior of others  Judgment: the patient's behavior is adequate to circumstances      History of present illness:    Oncology History    No history exists.         Felicia Ruffin has adjusted to illness with difficulty primarily through passive coping strategies, focus on family, and exercise. She has engaged in appropriate information gathering.  The patient has adequate family/friend support.  Her support system is coping  marginally with the diagnosis/treatment/prognosis. Illness-related psychosocial stressors include financial strain , absence from work, difficulty meeting family responsibilities, and changes in ability to engage in leisure activities.  The patient has a adequate partnership with her [facility]. However, Ms. Ruffin also receives cancer care from [facility] and [facility] Gallegos. The patient reports the following barriers to cancer care: lack of communication with her cancer treatment team and confusion regarding her prognosis/treatment plan.     NCCN Distress thermometer:       1/4/2023     8:40 AM   DISTRESS SCREENING   Distress Score 7        Symptoms:   Mood: depressed mood, worthlessness/guilt, and tearfulness;  prior depression:situational throughout adulthood ; no SI/HI  Anxiety: Feeling nervous, anxious, or on edge and Fear of unknown; no prior  Substance abuse: denied  Cognitive functioning:  She is experiencing difficulty concentrating and memory issues which she believes is related to chemotherapy   Health behaviors: noncontributory  Sleep: she is utilizing prescribed medication to help her sleep. Cancer-related fatigue leads her to nap during the day which impacts her ability to fall asleep at night. However, she reports a history of difficulty falling asleep prior to her cancer diagnosis.   Pain: Ms. Ruffin reports minimal cancer-related pain. She is currently experiencing pain related to her anal fissure and is actively engaged in/seeking treatment to reduce her pain level.       Assessment - Diagnosis - Goals:       ICD-10-CM ICD-9-CM   1. Adjustment disorder with mixed anxiety and depressed mood  F43.23 309.28         Plan:individual psychotherapy    Summary and Recommendations  Felicia Ruffin is a 57 y.o. female referred by No ref. provider found for psychological evaluation and treatment.  Ms. Ruffin appears to be having difficulty coping with her diagnosis and proposed treatment  course.  She is interested in individual therapy for cancer coping skills and will follow up with me for that purpose.    Return to clinic: 3 weeks    GOALS:   She was encouraged to write down questions for her oncology treatment team prior to her appointments. Additionally, she was encouraged to write down the oncologists answers to her questions to attempt to bridge the gap in the miscommunication between her and her treatment team.               Magda Ace ,PhD  Clinical Health Psychology Fellow

## 2023-09-21 ENCOUNTER — PATIENT MESSAGE (OUTPATIENT)
Dept: ENDOSCOPY | Facility: HOSPITAL | Age: 58
End: 2023-09-21
Payer: OTHER GOVERNMENT

## 2023-09-21 ENCOUNTER — OFFICE VISIT (OUTPATIENT)
Dept: PSYCHIATRY | Facility: CLINIC | Age: 58
End: 2023-09-21
Payer: OTHER GOVERNMENT

## 2023-09-21 ENCOUNTER — TELEPHONE (OUTPATIENT)
Dept: ENDOSCOPY | Facility: HOSPITAL | Age: 58
End: 2023-09-21
Payer: OTHER GOVERNMENT

## 2023-09-21 DIAGNOSIS — F43.23 ADJUSTMENT DISORDER WITH MIXED ANXIETY AND DEPRESSED MOOD: Primary | ICD-10-CM

## 2023-09-21 DIAGNOSIS — K60.2 ANAL FISSURE: Primary | ICD-10-CM

## 2023-09-21 PROCEDURE — 90791 PR PSYCHIATRIC DIAGNOSTIC EVALUATION: ICD-10-PCS | Mod: ,,,

## 2023-09-21 PROCEDURE — 90791 PSYCH DIAGNOSTIC EVALUATION: CPT | Mod: ,,,

## 2023-09-21 NOTE — TELEPHONE ENCOUNTER
"----- Message from MARICRUZ Contreras MD sent at 2023 11:19 AM CDT -----  Procedure: anoscopy with botox    Diagnosis: anal fissure    Procedure Timin-4 weeks    #If within 4 weeks selected, please tez as high priority#    #If greater than 12 weeks, please select "5-12 weeks" and delay sending until 2 months prior to requested date#     Provider: Myself    Location: [address]    Additional Scheduling Information: No scheduling concerns    Prep Specifications:N/A    Is the patient taking a GLP-1 Agonist:no    Have you attached a patient to this message: yes     "

## 2023-09-21 NOTE — TELEPHONE ENCOUNTER
Spoke to Felicia  to schedule procedure(s) Anoscopy       Physician to perform procedure(s) Dr. MARICRUZ Contreras  Date of Procedure (s) 9/22/23  Arrival Time 3:00 PM  Time of Procedure(s) 4:00 PM   Location of Procedure(s) [address]  Type of Rx Prep sent to patient: Other  Instructions provided to patient via [facility]    Patient was informed on the following information and verbalized understanding. Screening questionnaire reviewed with patient and complete. If procedure requires anesthesia, a responsible adult needs to be present to accompany the patient home, patient cannot drive after receiving anesthesia. Appointment details are tentative, especially check-in time. Patient will receive a prep-op call 4 days prior to confirm check-in time for procedure. If applicable the patient should contact their pharmacy to verify Rx for procedure prep is ready for pick-up. Patient was advised to call the scheduling department at [phone] if pharmacy states no Rx is available. Patient was advised to call the endoscopy scheduling department if any questions or concerns arise.      [facility] End[facility] Scheduling Department

## 2023-09-22 ENCOUNTER — PATIENT MESSAGE (OUTPATIENT)
Dept: ENDOSCOPY | Facility: HOSPITAL | Age: 58
End: 2023-09-22

## 2023-09-22 ENCOUNTER — ANESTHESIA (OUTPATIENT)
Dept: ENDOSCOPY | Facility: HOSPITAL | Age: 58
End: 2023-09-22
Payer: OTHER GOVERNMENT

## 2023-09-22 ENCOUNTER — ANESTHESIA EVENT (OUTPATIENT)
Dept: ENDOSCOPY | Facility: HOSPITAL | Age: 58
End: 2023-09-22
Payer: OTHER GOVERNMENT

## 2023-09-22 ENCOUNTER — HOSPITAL ENCOUNTER (OUTPATIENT)
Facility: HOSPITAL | Age: 58
Discharge: HOME | End: 2023-09-22
Attending: COLON & RECTAL SURGERY | Admitting: COLON & RECTAL SURGERY
Payer: OTHER GOVERNMENT

## 2023-09-22 VITALS
WEIGHT: 150 LBS | RESPIRATION RATE: 17 BRPM | SYSTOLIC BLOOD PRESSURE: 146 MMHG | DIASTOLIC BLOOD PRESSURE: 65 MMHG | HEART RATE: 58 BPM | BODY MASS INDEX: 27.6 KG/M2 | HEIGHT: 62 IN | TEMPERATURE: 98 F | OXYGEN SATURATION: 99 %

## 2023-09-22 DIAGNOSIS — K60.2 ANAL FISSURE: Primary | ICD-10-CM

## 2023-09-22 PROCEDURE — 46505 PR CHEMODENERVATION ANAL SPHINCTER: ICD-10-PCS | Mod: 50,,, | Performed by: COLON & RECTAL SURGERY

## 2023-09-22 PROCEDURE — 63600175 PHARM REV CODE 636 W HCPCS: Mod: JZ,JG | Performed by: COLON & RECTAL SURGERY

## 2023-09-22 PROCEDURE — 37000009 HC ANESTHESIA EA ADD 15 MINS: Performed by: COLON & RECTAL SURGERY

## 2023-09-22 PROCEDURE — 37000008 HC ANESTHESIA 1ST 15 MINUTES: Performed by: COLON & RECTAL SURGERY

## 2023-09-22 PROCEDURE — 63600175 PHARM REV CODE 636 W HCPCS: Performed by: NURSE ANESTHETIST, CERTIFIED REGISTERED

## 2023-09-22 PROCEDURE — 46505 CHEMODENERVATION ANAL MUSC: CPT | Performed by: COLON & RECTAL SURGERY

## 2023-09-22 PROCEDURE — 25000003 PHARM REV CODE 250: Performed by: NURSE ANESTHETIST, CERTIFIED REGISTERED

## 2023-09-22 PROCEDURE — E9220 PRA ENDO ANESTHESIA: ICD-10-PCS | Mod: ,,, | Performed by: NURSE ANESTHETIST, CERTIFIED REGISTERED

## 2023-09-22 PROCEDURE — 46505 CHEMODENERVATION ANAL MUSC: CPT | Mod: 50,,, | Performed by: COLON & RECTAL SURGERY

## 2023-09-22 PROCEDURE — 00902 ANES ANORECTAL PX: CPT | Performed by: COLON & RECTAL SURGERY

## 2023-09-22 PROCEDURE — E9220 PRA ENDO ANESTHESIA: HCPCS | Mod: ,,, | Performed by: NURSE ANESTHETIST, CERTIFIED REGISTERED

## 2023-09-22 RX ORDER — SODIUM CHLORIDE 9 MG/ML
INJECTION, SOLUTION INTRAVENOUS CONTINUOUS
Status: DISCONTINUED | OUTPATIENT
Start: 2023-09-22 | End: 2023-09-22 | Stop reason: HOSPADM

## 2023-09-22 RX ORDER — LIDOCAINE HYDROCHLORIDE 20 MG/ML
INJECTION INTRAVENOUS
Status: DISCONTINUED | OUTPATIENT
Start: 2023-09-22 | End: 2023-09-22

## 2023-09-22 RX ORDER — LIDOCAINE HYDROCHLORIDE 10 MG/ML
1 INJECTION, SOLUTION EPIDURAL; INFILTRATION; INTRACAUDAL; PERINEURAL ONCE
Status: DISCONTINUED | OUTPATIENT
Start: 2023-09-22 | End: 2023-09-22 | Stop reason: HOSPADM

## 2023-09-22 RX ORDER — PROPOFOL 10 MG/ML
VIAL (ML) INTRAVENOUS
Status: DISCONTINUED | OUTPATIENT
Start: 2023-09-22 | End: 2023-09-22

## 2023-09-22 RX ADMIN — PROPOFOL 80 MG: 10 INJECTION, EMULSION INTRAVENOUS at 12:09

## 2023-09-22 RX ADMIN — SODIUM CHLORIDE: 0.9 INJECTION, SOLUTION INTRAVENOUS at 12:09

## 2023-09-22 RX ADMIN — LIDOCAINE HYDROCHLORIDE 100 MG: 20 INJECTION INTRAVENOUS at 12:09

## 2023-09-22 NOTE — ANESTHESIA POSTPROCEDURE EVALUATION
Anesthesia Post Evaluation    Patient: Felicia Ruffin    Procedure(s) Performed: Procedure(s) (LRB):  ANOSCOPY (N/A)    Final Anesthesia Type: general      Patient location during evaluation: PACU  Patient participation: Yes- Able to Participate  Level of consciousness: awake and alert  Post-procedure vital signs: reviewed and stable  Pain management: adequate  Airway patency: patent    PONV status at discharge: No PONV  Anesthetic complications: no      Cardiovascular status: blood pressure returned to baseline  Respiratory status: unassisted  Hydration status: euvolemic  Follow-up not needed.          Vitals Value Taken Time   /65 09/22/23 1330   Temp 36.6 °C (97.9 °F) 09/22/23 1300   Pulse 58 09/22/23 1330   Resp 17 09/22/23 1330   SpO2 99 % 09/22/23 1330         Event Time   Out of Recovery 13:40:58         Pain/Lana Score: Lana Score: 10 (9/22/2023  1:30 PM)

## 2023-09-22 NOTE — ANESTHESIA PREPROCEDURE EVALUATION
"                                                                                                             09/22/2023  Felicia Ruffin is a 57 y.o., female.      Pre-op Assessment    I have reviewed the Patient Summary Reports.       I have reviewed the Medications.     Review of Systems  Anesthesia Hx:  PONV History of prior surgery of interest to airway management or planning: Previous anesthesia: General   Social:  Non-Smoker, No Alcohol Use    Hematology/Oncology:         -- Anemia: Oncology Comments: Gastric Adeno CA, recently diagnosed with gastroparesis. Has not eaten since yesterday at 1 PM.  Had BM.     Cardiovascular:   Exercise tolerance: good Hypertension, well controlled    Renal/:   Chronic Renal Disease renal calculi    Hepatic/GI:  Hepatic/GI Normal    Musculoskeletal:  Musculoskeletal Normal    Neurological:   Neuromuscular Disease,    Endocrine:  Endocrine Normal    Psych:  Psychiatric Normal           Physical Exam  General: Well nourished, Cooperative, Alert and Oriented    Airway:  Mallampati: II   Mouth Opening: Normal  TM Distance: Normal  Tongue: Normal  Neck ROM: Normal ROM    Dental:  Intact        Anesthesia Plan  Type of Anesthesia, risks & benefits discussed:    Anesthesia Type: Gen Natural Airway  Intra-op Monitoring Plan: Standard ASA Monitors  Post Op Pain Control Plan: multimodal analgesia and IV/PO Opioids PRN  Induction:  IV  Airway Plan: , Post-Induction  Informed Consent: Informed consent signed with the Patient and all parties understand the risks and agree with anesthesia plan.  All questions answered.   ASA Score: 3  Anesthesia Plan Notes: Pt requests lidocaine for IV because of her "rolling veins"    Ready For Surgery From Anesthesia Perspective.     .      "

## 2023-09-22 NOTE — [employer] PATIENT INSTRUCTIONS
Discharge Summary/Instructions after an Endoscopic Procedure  Patient Name: Felicia Ruffin  Patient MRN: [id number]  Patient YOB: 1965 Friday, September 22, 2023  Brigido Contreras MD  Dear patient,  As a result of recent federal legislation (The Federal Cures Act), you may   receive lab or pathology results from your procedure in your [facility]   account before your physician is able to contact you. Your physician or   their representative will relay the results to you with their   recommendations at their soonest availability.  Thank you,  RESTRICTIONS:  During your procedure today, you received medications for sedation.  These   medications may affect your judgment, balance and coordination.  Therefore,   for 24 hours, you have the following restrictions:   - DO NOT drive a car, operate machinery, make legal/financial decisions,   sign important papers or drink alcohol.    ACTIVITY:  Today: no heavy lifting, straining or running due to procedural   sedation/anesthesia.  The following day: return to full activity including work.  DIET:  Eat and drink normally unless instructed otherwise.     TREATMENT FOR COMMON SIDE EFFECTS:  - Mild abdominal pain, nausea, belching, bloating or excessive gas:  rest,   eat lightly and use a heating pad.  - Sore Throat: treat with throat lozenges and/or gargle with warm salt   water.  - Because air was used during the procedure, expelling large amounts of air   from your rectum or belching is normal.  - If a bowel prep was taken, you may not have a bowel movement for 1-3 days.    This is normal.  SYMPTOMS TO WATCH FOR AND REPORT TO YOUR PHYSICIAN:  1. Abdominal pain or bloating, other than gas cramps.  2. Chest pain.  3. Back pain.  4. Signs of infection such as: chills or fever occurring within 24 hours   after the procedure.  5. Rectal bleeding, which would show as bright red, maroon, or black stools.   (A tablespoon of blood from the rectum is not serious,  especially if   hemorrhoids are present.)  6. Vomiting.  7. Weakness or dizziness.  GO DIRECTLY TO THE NEAREST EMERGENCY ROOM IF YOU HAVE ANY OF THE FOLLOWING:      Difficulty breathing              Chills and/or fever over 101 F   Persistent vomiting and/or vomiting blood   Severe abdominal pain   Severe chest pain   Black, tarry stools   Bleeding- more than one tablespoon   Any other symptom or condition that you feel may need urgent attention  Your doctor recommends these additional instructions:  If any biopsies were taken, your doctors clinic will contact you in 1 to 2   weeks with any results.  - Discharge patient to home (ambulatory).   - Continue present medications.   - Repeat anoscopy PRN for retreatment.   - Return to my office PRN.   - Please call or send me a message in 2 weeks with an update  For questions, problems or results please call your physician - Brigido Contreras MD at Work:  [phone].  [facility] [place], EMERGENCY ROOM PHONE NUMBER: [phone]  IF A COMPLICATION OR EMERGENCY SITUATION ARISES AND YOU ARE UNABLE TO REACH   YOUR PHYSICIAN - GO DIRECTLY TO THE EMERGENCY ROOM.  Brigido Contreras MD  9/22/2023 12:56:54 PM  This report has been verified and signed electronically.  Dear patient,  As a result of recent federal legislation (The Federal Cures Act), you may   receive lab or pathology results from your procedure in your [facility]   account before your physician is able to contact you. Your physician or   their representative will relay the results to you with their   recommendations at their soonest availability.  Thank you,  [employer]ATION

## 2023-09-22 NOTE — TRANSFER OF CARE
"Anesthesia Transfer of Care Note    Patient: Felicia Ruffin    Procedure(s) Performed: Procedure(s) (LRB):  ANOSCOPY (N/A)    Patient location: PACU    Anesthesia Type: general    Transport from OR: Transported from OR on 6-10 L/min O2 by face mask with adequate spontaneous ventilation    Post pain: adequate analgesia    Post assessment: no apparent anesthetic complications    Post vital signs: stable    Level of consciousness: awake, alert and oriented    Nausea/Vomiting: no nausea/vomiting    Complications: none    Transfer of care protocol was followed      Last vitals:   Visit Vitals  /68 (BP Location: Left arm, Patient Position: Lying)   Pulse 69   Temp 36.8 °C (98.2 °F) (Temporal)   Resp 18   Ht 5' 2" (1.575 m)   Wt 68 kg (150 lb)   LMP 07/01/2017   SpO2 99%   Breastfeeding No   BMI 27.44 kg/m²     "

## 2023-10-10 ENCOUNTER — OFFICE VISIT (OUTPATIENT)
Dept: PSYCHIATRY | Facility: CLINIC | Age: 58
End: 2023-10-10
Payer: OTHER GOVERNMENT

## 2023-10-10 DIAGNOSIS — F43.23 ADJUSTMENT DISORDER WITH MIXED ANXIETY AND DEPRESSED MOOD: Primary | ICD-10-CM

## 2023-10-10 PROCEDURE — 90837 PSYTX W PT 60 MINUTES: CPT | Mod: ,,,

## 2023-10-10 PROCEDURE — 90837 PR PSYCHOTHERAPY W/PATIENT, 60 MIN: ICD-10-PCS | Mod: ,,,

## 2023-10-10 NOTE — PROGRESS NOTES
PSYCHO-ONCOLOGY NOTE/ Individual Psychotherapy     Date: 10/10/2023   Site:  [facility]      Therapeutic Intervention: Met with patient.  Outpatient - Supportive psychotherapy 60 min - CPT Code 74264    This includes face to face time and non-face to face time preparing to see the patient, obtaining and/or reviewing separately obtained history, documenting clinical information in the electronic or other health record, independently interpreting results and communicating results to the patient/family/caregiver, or care coordinator.      Patient was last seen by me on 9/21/2023    Problem list  Patient Active Problem List   Diagnosis    HTN (hypertension)    Asthma    Pre-diabetes    Kidney stone    Chronic lumbar pain    Hyperlipemia    Anal fissure    Iron deficiency anemia, unspecified    OB + stool    Dysphagia    Gastric adenocarcinoma    Chemotherapy-induced peripheral neuropathy    Chemotherapy-induced fatigue    Insomnia       Chief complaint/reason for encounter: anxiety and decision making  Met with patient to evaluate psychosocial adaptation to diagnosis/treatment/survivorship of Gastric adenocarcinoma        Current Medications  Current Outpatient Medications   Medication    acetaminophen (TYLENOL) 500 MG tablet    aspirin 81 MG Chew    atorvastatin (LIPITOR) 20 MG tablet    azilsartan med-chlorthalidone (EDARBYCLOR) 40-25 mg Tab    budesonide-formoterol 80-4.5 mcg (SYMBICORT) 80-4.5 mcg/actuation HFAA    cetirizine (ZYRTEC) 10 MG tablet    COVID-19 vacc,mRNA,Moderna,-PF (SPIKEVAX, PF,) 100 mcg/0.5 mL Susp    docusate sodium (COLACE) 100 MG capsule    estradiol-norethindrone (COMBIPATCH) 0.05-0.14 mg/24 hr    hydrocortisone (ANUSOL-HC) 25 mg suppository    hydrocortisone 2.5 % cream    ibuprofen (ADVIL,MOTRIN) 400 MG tablet    ibuprofen (ADVIL,MOTRIN) 600 MG tablet    LIDOcaine (LIDODERM) 5 %    LINZESS 72 mcg Cap capsule    multivitamin (THERAGRAN) per tablet    omega-3 fatty acids/fish oil (FISH  OIL-OMEGA-3 FATTY ACIDS) 300-1,000 mg capsule    pantoprazole (PROTONIX) 40 MG tablet    traMADoL (ULTRAM) 50 mg tablet    vitamin D3-folic acid 5,000 unit- 1 mg Tab    vitamin E 100 UNIT capsule    zinc gluconate 50 mg tablet     No current facility-administered medications for this visit.     Facility-Administered Medications Ordered in Other Visits   Medication Frequency    0.9%  NaCl infusion Continuous    mupirocin 2 % ointment On Call Procedure       ONCOLOGY HISTORY  Oncology History    No history exists.       Objective:  Felicia Ruffin arrived  promptly for the session.  Ms. Ruffin was independently ambulatory at the time of session. The patient was fully cooperative throughout the session.  Appearance: age appropriate, appropriately  dressed, adequately  groomed  Behavior/Cooperation: friendly and cooperative  Speech: appropriate quality, quantity and organization of sentences and pressured  Mood: anxious  Affect: anxious and tearful at times, appropriate to the content of the conversation   Thought Process: goal-directed, logical  Thought Content: normal,  No delusions or paranoia; did not appear to be responding to internal stimuli during the session  Orientation: grossly intact  Memory: grossly intact  Attention Span/Concentration: Attends to session without distraction; reports no difficulty  Fund of Knowledge: average  Estimate of Intelligence: average from verbal skills and history  Cognition: grossly intact  Insight: patient has awareness of illness; good insight into own behavior and behavior of others  Judgment: the patient's behavior is adequate to circumstances    NCCN Distress thermometer:       1/4/2023     8:40 AM   DISTRESS SCREENING   Distress Score 7      PHQ-9 score: 19  JACQUELINE-7 score: 20    Interval history and content of current session:   Discussed diagnosis, treatment, prognosis, and current adaptation to disease and treatment status. Reports to be coping with significant difficulty.  She just received news of a spot on her liver, nodules in her GI tract and lymph node inflammation. Despite this evidence, she continues to get different answers from various providers about her diagnosis, further testing and treatment plan. She voiced frustration about providers continually bringing up her mortality. She views herself as having three treatment options (chemo, clinical trial, holistic medicine) and is unsure how to make a decision of this gravity. Felicia Ruffin is concerned about the impact of her decision and treatment on her son. She was encouraged to write out her options, pros and cons of each option, and to identify further questions she has regarding her options. Although she is willing to try this, she does not think this will help bring her closer to a decision. She is hoping God will give her the answer on what treatment course to take. Felicia Ruffin discussed previous decisions she made and whether she made them based in emotional reasoning or rational reasoning. She was introduced to a grounding coping skill (5-4-3-2-1) to reduce the spiral of anxious thoughts.      Risk parameters:   Patient reports no suicidal ideation  Patient reports no homicidal ideation  Patient reports no self-injurious behavior  Patient reports no violent behavior    Safety needs:  None at this time      Verbal deficits: None     Patient's response to intervention:The patient's response to intervention is reluctant.     Progress toward goals and other mental status changes:  The patient's progress toward goals is fair .      Progress to date:Progress - Ongoing, but Slow      Goals from last visit:  Since this is the first therapy session with this provider, goals were not previously established.        Patient Strengths: verbal, intelligent, successful, good insight, commitment to wellness, strong iron, advocate for herself      Treatment Plan:individual psychotherapy  Target symptoms: anxiety , decision  making  Why chosen therapy is appropriate versus another modality: relevant to diagnosis  Outcome monitoring methods: self-report  Therapeutic intervention type: supportive psychotherapy  Prognosis: Fair      Behavioral goals:    Stress management: practice grounding technique to reduce anxious thoughts     Return to clinic: 1 week     Length of Service (minutes direct face-to-face contact):  75 minutes    Diagnosis:     ICD-10-CM ICD-9-CM   1. Adjustment disorder with mixed anxiety and depressed mood  F43.23 309.28                     Magda Ace, PhD  Clinical Health Psychology Fellow

## 2023-10-12 ENCOUNTER — PATIENT MESSAGE (OUTPATIENT)
Dept: PSYCHIATRY | Facility: CLINIC | Age: 58
End: 2023-10-12
Payer: OTHER GOVERNMENT

## 2023-10-16 NOTE — PROGRESS NOTES
PSYCHO-ONCOLOGY NOTE/ Individual Psychotherapy     Date: 10/17/2023   Site:  [facility]      Therapeutic Intervention: Met with patient.  Outpatient - Behavior modifying psychotherapy 60 min - CPT code 86195    This includes face to face time and non-face to face time preparing to see the patient, obtaining and/or reviewing separately obtained history, documenting clinical information in the electronic or other health record, independently interpreting results and communicating results to the patient/family/caregiver, or care coordinator.      Patient was last seen by me on 10/10/2023    Problem list  Patient Active Problem List   Diagnosis    HTN (hypertension)    Asthma    Pre-diabetes    Kidney stone    Chronic lumbar pain    Hyperlipemia    Anal fissure    Iron deficiency anemia, unspecified    OB + stool    Dysphagia    Gastric adenocarcinoma    Chemotherapy-induced peripheral neuropathy    Chemotherapy-induced fatigue    Insomnia       Chief complaint/reason for encounter: depression and sexual problems   Met with patient to evaluate psychosocial adaptation to diagnosis/treatment/survivorship of Gastric adenocarcinoma    Current Medications  Current Outpatient Medications   Medication    acetaminophen (TYLENOL) 500 MG tablet    aspirin 81 MG Chew    atorvastatin (LIPITOR) 20 MG tablet    azilsartan med-chlorthalidone (EDARBYCLOR) 40-25 mg Tab    budesonide-formoterol 80-4.5 mcg (SYMBICORT) 80-4.5 mcg/actuation HFAA    cetirizine (ZYRTEC) 10 MG tablet    COVID-19 vacc,mRNA,Moderna,-PF (SPIKEVAX, PF,) 100 mcg/0.5 mL Susp    docusate sodium (COLACE) 100 MG capsule    estradiol-norethindrone (COMBIPATCH) 0.05-0.14 mg/24 hr    hydrocortisone (ANUSOL-HC) 25 mg suppository    hydrocortisone 2.5 % cream    ibuprofen (ADVIL,MOTRIN) 400 MG tablet    ibuprofen (ADVIL,MOTRIN) 600 MG tablet    LIDOcaine (LIDODERM) 5 %    LINZESS 72 mcg Cap capsule    multivitamin (THERAGRAN) per tablet    omega-3 fatty acids/fish oil  (FISH OIL-OMEGA-3 FATTY ACIDS) 300-1,000 mg capsule    pantoprazole (PROTONIX) 40 MG tablet    traMADoL (ULTRAM) 50 mg tablet    vitamin D3-folic acid 5,000 unit- 1 mg Tab    vitamin E 100 UNIT capsule    zinc gluconate 50 mg tablet     No current facility-administered medications for this visit.     Facility-Administered Medications Ordered in Other Visits   Medication Frequency    0.9%  NaCl infusion Continuous    mupirocin 2 % ointment On Call Procedure       ONCOLOGY HISTORY  Oncology History    No history exists.       Objective:  Felicia Ruffin arrived promptly for the session.  Ms. Ruffin was independently ambulatory at the time of session. The patient was fully cooperative throughout the session.  Appearance: age appropriate, appropriately  dressed, adequately  groomed  Behavior/Cooperation: friendly and cooperative  Speech: normal in rate, volume, and tone  Mood: steady  Affect: appropriate  Thought Process: goal-directed, logical  Thought Content: normal,  No delusions or paranoia; did not appear to be responding to internal stimuli during the session  Orientation: grossly intact  Memory: grossly intact; subjective reports of memory difficulties as a side effect of her recent chemo treatment   Attention Span/Concentration: Attends to session without distraction; reports no difficulty  Fund of Knowledge: average  Estimate of Intelligence: average from verbal skills and history  Cognition: grossly intact  Insight: patient has awareness of illness; good insight into own behavior and behavior of others  Judgment: the patient's behavior is adequate to circumstances    NCCN Distress thermometer:       1/4/2023     8:40 AM   DISTRESS SCREENING   Distress Score 7        PHQ-9 score: 12  JACQUELINE-7 score: 17    Interval history and content of current session:  Discussed treatment, prognosis, current adaptation to disease and treatment status, and family's adaptation to disease and treatment status. Reports to be coping  with significant difficulty. She decided to continue with chemotherapy and started her first cycle last week. She is experiencing side effects including fatigue, loss of energy and flu-like body aches. Given her physical side effects, she reported feeling distraught because she has been unable to complete activities (work, house chores, paying bills, etc.). She also reported feeling guilty about not being able to engage in sexual intercourse with her . Thoughts of this type are in evidence with severe distress. Provided cognitive behavioral therapy to address negative cognitions. She brainstormed other ways to be sexually intimate with her . Felicia Ruffin also problem-solved ways to have her son help with vacuuming the house. She was also reminded of the importance of pacing her activities and rest.      Risk parameters:   Patient reports no suicidal ideation  Patient reports no homicidal ideation  Patient reports no self-injurious behavior  Patient reports no violent behavior   Safety needs:  None at this time      Verbal deficits: None     Patient's response to intervention:The patient's response to intervention is reluctant because she is used to taking care of her family without help.     Progress toward goals and other mental status changes:  The patient's progress toward goals is good.      Progress to date:Progress - Ongoing, but Slow      Goals from last visit: Met (made a decision regarding her treatment)        Patient Strengths: verbal, intelligent, successful, good insight, commitment to wellness, strong iron, advocate for herself      Treatment Plan:individual psychotherapy  Target symptoms: depression, interpersonal  Why chosen therapy is appropriate versus another modality: relevant to diagnosis  Outcome monitoring methods: self-report  Therapeutic intervention type: behavior modifying psychotherapy  Prognosis: Good      Behavioral goals:    Stress management: talk with her son about  vacuuming the house at least twice a week   Social engagement: talk to her  about alternatives to sexual intercourse      Return to clinic: 1 week     Length of Service (minutes direct face-to-face contact): 60 minutes    Diagnosis:     ICD-10-CM ICD-9-CM   1. Adjustment disorder with mixed anxiety and depressed mood  F43.23 309.28                     Magda Ace, PhD  Clinical Health Psychology Fellow

## 2023-10-17 ENCOUNTER — OFFICE VISIT (OUTPATIENT)
Dept: PSYCHIATRY | Facility: CLINIC | Age: 58
End: 2023-10-17
Payer: OTHER GOVERNMENT

## 2023-10-17 DIAGNOSIS — F43.23 ADJUSTMENT DISORDER WITH MIXED ANXIETY AND DEPRESSED MOOD: Primary | ICD-10-CM

## 2023-10-17 PROCEDURE — 90837 PR PSYCHOTHERAPY W/PATIENT, 60 MIN: ICD-10-PCS | Mod: ,,,

## 2023-10-17 PROCEDURE — 90837 PSYTX W PT 60 MINUTES: CPT | Mod: ,,,

## 2023-10-24 ENCOUNTER — OFFICE VISIT (OUTPATIENT)
Dept: PSYCHIATRY | Facility: CLINIC | Age: 58
End: 2023-10-24
Payer: OTHER GOVERNMENT

## 2023-10-24 DIAGNOSIS — F43.23 ADJUSTMENT DISORDER WITH MIXED ANXIETY AND DEPRESSED MOOD: Primary | ICD-10-CM

## 2023-10-24 PROCEDURE — 90837 PSYTX W PT 60 MINUTES: CPT | Mod: ,,,

## 2023-10-24 PROCEDURE — 90837 PR PSYCHOTHERAPY W/PATIENT, 60 MIN: ICD-10-PCS | Mod: ,,,

## 2023-10-24 NOTE — PROGRESS NOTES
PSYCHO-ONCOLOGY NOTE/ Individual Psychotherapy     Date: 10/24/2023   Site:  [facility]      Therapeutic Intervention: Met with patient.  Outpatient - Supportive psychotherapy 60 min - CPT Code 60390    This includes face to face time and non-face to face time preparing to see the patient, obtaining and/or reviewing separately obtained history, documenting clinical information in the electronic or other health record, independently interpreting results and communicating results to the patient/family/caregiver, or care coordinator.      Patient was last seen by me on 10/17/2023    Problem list  Patient Active Problem List   Diagnosis    HTN (hypertension)    Asthma    Pre-diabetes    Kidney stone    Chronic lumbar pain    Hyperlipemia    Anal fissure    Iron deficiency anemia, unspecified    OB + stool    Dysphagia    Gastric adenocarcinoma    Chemotherapy-induced peripheral neuropathy    Chemotherapy-induced fatigue    Insomnia       Chief complaint/reason for encounter: anxiety and fatigue   Met with patient to evaluate psychosocial adaptation to diagnosis/treatment/survivorship of Gastric adenocarcinoma    Current Medications  Current Outpatient Medications   Medication    acetaminophen (TYLENOL) 500 MG tablet    aspirin 81 MG Chew    atorvastatin (LIPITOR) 20 MG tablet    azilsartan med-chlorthalidone (EDARBYCLOR) 40-25 mg Tab    budesonide-formoterol 80-4.5 mcg (SYMBICORT) 80-4.5 mcg/actuation HFAA    cetirizine (ZYRTEC) 10 MG tablet    COVID-19 vacc,mRNA,Moderna,-PF (SPIKEVAX, PF,) 100 mcg/0.5 mL Susp    docusate sodium (COLACE) 100 MG capsule    estradiol-norethindrone (COMBIPATCH) 0.05-0.14 mg/24 hr    hydrocortisone (ANUSOL-HC) 25 mg suppository    hydrocortisone 2.5 % cream    ibuprofen (ADVIL,MOTRIN) 400 MG tablet    ibuprofen (ADVIL,MOTRIN) 600 MG tablet    LIDOcaine (LIDODERM) 5 %    LINZESS 72 mcg Cap capsule    multivitamin (THERAGRAN) per tablet    omega-3 fatty acids/fish oil (FISH OIL-OMEGA-3  FATTY ACIDS) 300-1,000 mg capsule    pantoprazole (PROTONIX) 40 MG tablet    traMADoL (ULTRAM) 50 mg tablet    vitamin D3-folic acid 5,000 unit- 1 mg Tab    vitamin E 100 UNIT capsule    zinc gluconate 50 mg tablet     No current facility-administered medications for this visit.     Facility-Administered Medications Ordered in Other Visits   Medication Frequency    0.9%  NaCl infusion Continuous    mupirocin 2 % ointment On Call Procedure       ONCOLOGY HISTORY  Oncology History    No history exists.       Objective:  Felicia Ruffin arrived promptly for the session.  Ms. Ruffin was independently ambulatory at the time of session. The patient was fully cooperative throughout the session.  Appearance: age appropriate, appropriately  dressed, adequately  groomed  Behavior/Cooperation: friendly and cooperative  Speech: normal in rate, volume, and tone  Mood: steady  Affect: euthymic and irritable  Thought Process: goal-directed, logical  Thought Content: normal,  No delusions or paranoia; did not appear to be responding to internal stimuli during the session  Orientation: grossly intact  Memory: grossly intact  Attention Span/Concentration: Attends to session without distraction; reports no difficulty  Fund of Knowledge: average  Estimate of Intelligence: average from verbal skills and history  Cognition: grossly intact  Insight: patient has awareness of illness; good insight into own behavior and behavior of others  Judgment: the patient's behavior is adequate to circumstances    NCCN Distress thermometer:       1/4/2023     8:40 AM   DISTRESS SCREENING   Distress Score 7        Interval history and content of current session:  Discussed diagnosis, treatment, prognosis, current adaptation to disease and treatment status, and family's adaptation to disease and treatment status. Reports to be coping with significant difficulty. She continues to wonder about the connection between her procedure, her symptoms returning and  the recurrence of her cancer. Evaluated cognitive response, paying particular attention to negative intrusive thoughts of a persistent and detrimental nature. Thoughts of this type are in evidence with moderate distress. Provided cognitive behavioral therapy to address negative cognitions. Although she recognizes her thoughts are not helpful, she continues to want answers. She was encouraged to contact patient advocacy if she would like to pursue finding out more information about the procedure. Identified and evaluated psychosocial and environmental stressors secondary to diagnosis and treatment. Specifically, her  and son continue to not help her with house chores and Felicia Ruffin has to complete tasks despite treatment-related fatigue. Examined proactive behaviors that may be implemented to minimize or ameliorate psychosocial stressors secondary to diagnosis and treatment. Specifically, she discussed ways to compromise with her  to help with vacuuming the home.      Risk parameters:   Patient reports no suicidal ideation  Patient reports no homicidal ideation  Patient reports no self-injurious behavior  Patient reports no violent behavior   Safety needs:  None at this time      Verbal deficits: None     Patient's response to intervention:The patient's response to intervention is accepting.     Progress toward goals and other mental status changes:  The patient's progress toward goals is good.      Progress to date:Progress - Ongoing, but Slow      Goals from last visit: Met, however, her family was not receptive to her needs.         Patient Strengths: verbal, intelligent, successful, good social support, good insight, commitment to wellness, strong iron      Treatment Plan:individual psychotherapy  Target symptoms: anxiety , interpersonal  Why chosen therapy is appropriate versus another modality: patient responds to this modality  Outcome monitoring methods: self-report  Therapeutic intervention  type: supportive psychotherapy  Prognosis: Good      Behavioral goals:    Therapy: compromise with her  to have him help with physically demanding chores    Return to clinic: 1 week     Length of Service (minutes direct face-to-face contact): 60 minutes    Diagnosis:     ICD-10-CM ICD-9-CM   1. Adjustment disorder with mixed anxiety and depressed mood  F43.23 309.28                     Magda Ace, PhD  Clinical Health Psychology Fellow

## 2023-11-01 NOTE — PROGRESS NOTES
PSYCHO-ONCOLOGY NOTE/ Individual Psychotherapy     Date: 11/2/2023   Site:  [facility]      Therapeutic Intervention: Met with patient.  Outpatient - Behavior modifying psychotherapy 45 min - CPT code 05927 and Outpatient - Supportive psychotherapy 45 min - CPT Code 79446    This includes face to face time and non-face to face time preparing to see the patient, obtaining and/or reviewing separately obtained history, documenting clinical information in the electronic or other health record, independently interpreting results and communicating results to the patient/family/caregiver, or care coordinator.      Patient was last seen by me on 10/24/2023    Problem list  Patient Active Problem List   Diagnosis    HTN (hypertension)    Asthma    Pre-diabetes    Kidney stone    Chronic lumbar pain    Hyperlipemia    Anal fissure    Iron deficiency anemia, unspecified    OB + stool    Dysphagia    Gastric adenocarcinoma    Chemotherapy-induced peripheral neuropathy    Chemotherapy-induced fatigue    Insomnia       Chief complaint/reason for encounter: fatigue   Met with patient to evaluate psychosocial adaptation to diagnosis/treatment/survivorship of Gastric adenocarcinoma    Current Medications  Current Outpatient Medications   Medication    acetaminophen (TYLENOL) 500 MG tablet    aspirin 81 MG Chew    atorvastatin (LIPITOR) 20 MG tablet    azilsartan med-chlorthalidone (EDARBYCLOR) 40-25 mg Tab    budesonide-formoterol 80-4.5 mcg (SYMBICORT) 80-4.5 mcg/actuation HFAA    cetirizine (ZYRTEC) 10 MG tablet    COVID-19 vacc,mRNA,Moderna,-PF (SPIKEVAX, PF,) 100 mcg/0.5 mL Susp    docusate sodium (COLACE) 100 MG capsule    estradiol-norethindrone (COMBIPATCH) 0.05-0.14 mg/24 hr    hydrocortisone (ANUSOL-HC) 25 mg suppository    hydrocortisone 2.5 % cream    ibuprofen (ADVIL,MOTRIN) 400 MG tablet    ibuprofen (ADVIL,MOTRIN) 600 MG tablet    LIDOcaine (LIDODERM) 5 %    LINZESS 72 mcg Cap capsule    multivitamin (THERAGRAN) per  tablet    omega-3 fatty acids/fish oil (FISH OIL-OMEGA-3 FATTY ACIDS) 300-1,000 mg capsule    pantoprazole (PROTONIX) 40 MG tablet    traMADoL (ULTRAM) 50 mg tablet    vitamin D3-folic acid 5,000 unit- 1 mg Tab    vitamin E 100 UNIT capsule    zinc gluconate 50 mg tablet     No current facility-administered medications for this visit.     Facility-Administered Medications Ordered in Other Visits   Medication Frequency    0.9%  NaCl infusion Continuous    mupirocin 2 % ointment On Call Procedure       ONCOLOGY HISTORY  Oncology History    No history exists.       Objective:  Felicia Ruffin arrived 10 minutes late for the session.  Ms. Ruffin was independently ambulatory at the time of session. The patient was fully cooperative throughout the session.  Appearance: age appropriate, appropriately  dressed, adequately  groomed  Behavior/Cooperation: friendly and cooperative  Speech: normal in rate, volume, and tone  Mood: euthymic  Affect: euthymic  Thought Process: goal-directed, logical  Thought Content: normal,  No delusions or paranoia; did not appear to be responding to internal stimuli during the session  Orientation: grossly intact  Memory: grossly intact  Attention Span/Concentration: Attends to session without distraction; reports no difficulty  Fund of Knowledge: average  Estimate of Intelligence: average from verbal skills and history  Cognition: grossly intact  Insight: patient has awareness of illness; good insight into own behavior and behavior of others  Judgment: the patient's behavior is adequate to circumstances    NCCN Distress thermometer:       1/4/2023     8:40 AM   DISTRESS SCREENING   Distress Score 7        Interval history and content of current session: Discussed treatment, current adaptation to disease and treatment status, and family's adaptation to disease and treatment status. Reports to be coping with significant difficulty. Her family (son, mother, , sister) do not understand the  fatigue Ms. Ruffin is experiencing from chemotherapy. She has difficulty completing tasks due to her fatigue. Evaluated cognitive response, paying particular attention to negative intrusive thoughts of a persistent and detrimental nature. Thoughts of this type are in evidence with moderate distress. Specifically, she questions whether she is fatigued from chemotherapy or from her body dying. She was reminded of fatigue being a common side effect to chemotherapy and when she is off of chemotherapy, her energy improves. She was encouraged to discuss options with her oncologist to address fatigue. She also reported losing weight and having a minimal appetite. She declined a referral to the nutritionist at this time. She acknowledged the potential connection between lack of nutrition and fatigue. Finally, Ms. Ruffin noted frustration with her inability to be productive. She was reminded that resting is productive and was encouraged to continue pacing her tasks.      Risk parameters:   Patient reports no suicidal ideation  Patient reports no homicidal ideation  Patient reports no self-injurious behavior  Patient reports no violent behavior   Safety needs:  None at this time      Verbal deficits: None     Patient's response to intervention:The patient's response to intervention is accepting.     Progress toward goals and other mental status changes:  The patient's progress toward goals is fair .      Progress to date:Progress - Ongoing, but Slow      Goals from last visit: Attempted, not met        Patient Strengths: verbal, intelligent, successful, good insight, commitment to wellness, strong iron      Treatment Plan:individual psychotherapy  Target symptoms:  fatigue  Why chosen therapy is appropriate versus another modality: evidence based practice  Outcome monitoring methods: self-report  Therapeutic intervention type: behavior modifying psychotherapy, supportive psychotherapy  Prognosis: Good      Behavioral  goals:    Therapy: continue pacing her activities, engage in positive self-talk regarding her fatigue     Return to clinic: 3 weeks     Length of Service (minutes direct face-to-face contact): 45 minutes    Diagnosis:     ICD-10-CM ICD-9-CM   1. Adjustment disorder with mixed anxiety and depressed mood  F43.23 309.28                     Magda Ace, PhD  Clinical Health Psychology Fellow

## 2023-11-02 ENCOUNTER — PATIENT MESSAGE (OUTPATIENT)
Dept: PSYCHIATRY | Facility: CLINIC | Age: 58
End: 2023-11-02
Payer: OTHER GOVERNMENT

## 2023-11-02 ENCOUNTER — OFFICE VISIT (OUTPATIENT)
Dept: PSYCHIATRY | Facility: CLINIC | Age: 58
End: 2023-11-02
Payer: OTHER GOVERNMENT

## 2023-11-02 DIAGNOSIS — F43.23 ADJUSTMENT DISORDER WITH MIXED ANXIETY AND DEPRESSED MOOD: Primary | ICD-10-CM

## 2023-11-02 PROCEDURE — 90834 PR PSYCHOTHERAPY W/PATIENT, 45 MIN: ICD-10-PCS | Mod: ,,,

## 2023-11-02 PROCEDURE — 90834 PSYTX W PT 45 MINUTES: CPT | Mod: ,,,

## 2023-11-28 ENCOUNTER — OFFICE VISIT (OUTPATIENT)
Dept: PSYCHIATRY | Facility: CLINIC | Age: 58
End: 2023-11-28
Payer: OTHER GOVERNMENT

## 2023-11-28 DIAGNOSIS — F43.23 ADJUSTMENT DISORDER WITH MIXED ANXIETY AND DEPRESSED MOOD: Primary | ICD-10-CM

## 2023-11-28 PROCEDURE — 90837 PR PSYCHOTHERAPY W/PATIENT, 60 MIN: ICD-10-PCS | Mod: ,,,

## 2023-11-28 PROCEDURE — 90837 PSYTX W PT 60 MINUTES: CPT | Mod: ,,,

## 2023-11-28 NOTE — PROGRESS NOTES
PSYCHO-ONCOLOGY NOTE/ Individual Psychotherapy     Date: 11/28/2023   Site:  [facility]      Therapeutic Intervention: Met with patient.  Outpatient - Behavior modifying psychotherapy 60 min - CPT Code 83038    This includes face to face time and non-face to face time preparing to see the patient, obtaining and/or reviewing separately obtained history, documenting clinical information in the electronic or other health record, independently interpreting results and communicating results to the patient/family/caregiver, or care coordinator.      Patient was last seen by me on 11/02/2023    Problem list  Patient Active Problem List   Diagnosis    HTN (hypertension)    Asthma    Pre-diabetes    Kidney stone    Chronic lumbar pain    Hyperlipemia    Anal fissure    Iron deficiency anemia, unspecified    OB + stool    Dysphagia    Gastric adenocarcinoma    Chemotherapy-induced peripheral neuropathy    Chemotherapy-induced fatigue    Insomnia       Chief complaint/reason for encounter: fatigue, confusion regarding her disease course   Met with patient to evaluate psychosocial adaptation to diagnosis/treatment/survivorship of Gastric adenocarcinoma    Current Medications  Current Outpatient Medications   Medication    acetaminophen (TYLENOL) 500 MG tablet    aspirin 81 MG Chew    atorvastatin (LIPITOR) 20 MG tablet    azilsartan med-chlorthalidone (EDARBYCLOR) 40-25 mg Tab    budesonide-formoterol 80-4.5 mcg (SYMBICORT) 80-4.5 mcg/actuation HFAA    cetirizine (ZYRTEC) 10 MG tablet    COVID-19 vacc,mRNA,Moderna,-PF (SPIKEVAX, PF,) 100 mcg/0.5 mL Susp    docusate sodium (COLACE) 100 MG capsule    estradiol-norethindrone (COMBIPATCH) 0.05-0.14 mg/24 hr    hydrocortisone (ANUSOL-HC) 25 mg suppository    hydrocortisone 2.5 % cream    ibuprofen (ADVIL,MOTRIN) 400 MG tablet    ibuprofen (ADVIL,MOTRIN) 600 MG tablet    LIDOcaine (LIDODERM) 5 %    LINZESS 72 mcg Cap capsule    multivitamin (THERAGRAN) per tablet    omega-3 fatty  acids/fish oil (FISH OIL-OMEGA-3 FATTY ACIDS) 300-1,000 mg capsule    pantoprazole (PROTONIX) 40 MG tablet    traMADoL (ULTRAM) 50 mg tablet    vitamin D3-folic acid 5,000 unit- 1 mg Tab    vitamin E 100 UNIT capsule    zinc gluconate 50 mg tablet     No current facility-administered medications for this visit.     Facility-Administered Medications Ordered in Other Visits   Medication Frequency    0.9%  NaCl infusion Continuous    mupirocin 2 % ointment On Call Procedure       ONCOLOGY HISTORY  Oncology History    No history exists.       Objective:  Felicia Ruffin arrived on time for the session.  Ms. Ruffin was independently ambulatory at the time of session. The patient was fully cooperative throughout the session.  Appearance: age appropriate, appropriately  dressed, adequately  groomed  Behavior/Cooperation: friendly and cooperative  Speech: normal in rate, volume, and tone  Mood: euthymic  Affect: euthymic  Thought Process: goal-directed, logical  Thought Content: normal,  No delusions or paranoia; did not appear to be responding to internal stimuli during the session  Orientation: grossly intact  Memory: grossly intact  Attention Span/Concentration: Attends to session without distraction; reports no difficulty  Fund of Knowledge: average  Estimate of Intelligence: average from verbal skills and history  Cognition: grossly intact  Insight: patient has awareness of illness; good insight into own behavior and behavior of others  Judgment: the patient's behavior is adequate to circumstances    NCCN Distress thermometer:       1/4/2023     8:40 AM   DISTRESS SCREENING   Distress Score 7        Interval history and content of current session: Discussed treatment, current adaptation to disease and treatment status, and family's adaptation to disease and treatment status. Reports to be coping with significant difficulty. She continues to experience treatment-related fatigue despite taking two weeks off from  treatment. During this time, she cleaned parts of her home and went on college tours with her son which exhausted her further. Her family continues to not understand her level of fatigue,. She continues to be confused by the course of her cancer and the scans her providers order. She reports receiving mixed information from her providers. She explored the unanswerable and uncontrollable questions she has and the potential answers. She was provided with the resource cleaning for a reason but is not willing to use this resource at this time.      Risk parameters:   Patient reports no suicidal ideation  Patient reports no homicidal ideation  Patient reports no self-injurious behavior  Patient reports no violent behavior   Safety needs:  None at this time      Verbal deficits: None     Patient's response to intervention:The patient's response to intervention is accepting.     Progress toward goals and other mental status changes:  The patient's progress toward goals is fair .      Progress to date:Progress - Ongoing, but Slow      Goals from last visit: Not attempted         Patient Strengths: verbal, intelligent, successful, good insight, commitment to wellness, strong iron      Treatment Plan:individual psychotherapy  Target symptoms:  fatigue  Why chosen therapy is appropriate versus another modality: evidence based practice  Outcome monitoring methods: self-report  Therapeutic intervention type: behavior modifying psychotherapy, supportive psychotherapy  Prognosis: Good      Behavioral goals:    Therapy: continue pacing her activities, inquire about whether her case can be presented at tumor board, continue to ask her family for support    Return to clinic: 3 weeks     Length of Service (minutes direct face-to-face contact): 45 minutes    Diagnosis:     ICD-10-CM ICD-9-CM   1. Adjustment disorder with mixed anxiety and depressed mood  F43.23 309.28                     Magda Ace, PhD  Clinical Health Psychology  Fellow

## 2023-12-27 NOTE — PROGRESS NOTES
PSYCHO-ONCOLOGY NOTE/ Individual Psychotherapy     Date: 12/28/2023   Site:  [facility]      Therapeutic Intervention: Met with patient.  Outpatient - Supportive psychotherapy 60 min - CPT Code 67516    This includes face to face time and non-face to face time preparing to see the patient, obtaining and/or reviewing separately obtained history, documenting clinical information in the electronic or other health record, independently interpreting results and communicating results to the patient/family/caregiver, or care coordinator.      Patient was last seen by me on 11/28/2023    Problem list  Patient Active Problem List   Diagnosis    HTN (hypertension)    Asthma    Pre-diabetes    Kidney stone    Chronic lumbar pain    Hyperlipemia    Anal fissure    Iron deficiency anemia, unspecified    OB + stool    Dysphagia    Gastric adenocarcinoma    Chemotherapy-induced peripheral neuropathy    Chemotherapy-induced fatigue    Insomnia       Chief complaint/reason for encounter: anger and depression   Met with patient to evaluate psychosocial adaptation to diagnosis/treatment/survivorship of gastric adenocarcinoma     Current Medications  Current Outpatient Medications   Medication    acetaminophen (TYLENOL) 500 MG tablet    aspirin 81 MG Chew    atorvastatin (LIPITOR) 20 MG tablet    azilsartan med-chlorthalidone (EDARBYCLOR) 40-25 mg Tab    budesonide-formoterol 80-4.5 mcg (SYMBICORT) 80-4.5 mcg/actuation HFAA    cetirizine (ZYRTEC) 10 MG tablet    COVID-19 vacc,mRNA,Moderna,-PF (SPIKEVAX, PF,) 100 mcg/0.5 mL Susp    docusate sodium (COLACE) 100 MG capsule    estradiol-norethindrone (COMBIPATCH) 0.05-0.14 mg/24 hr    hydrocortisone (ANUSOL-HC) 25 mg suppository    hydrocortisone 2.5 % cream    ibuprofen (ADVIL,MOTRIN) 400 MG tablet    ibuprofen (ADVIL,MOTRIN) 600 MG tablet    LIDOcaine (LIDODERM) 5 %    LINZESS 72 mcg Cap capsule    multivitamin (THERAGRAN) per tablet    omega-3 fatty acids/fish oil (FISH OIL-OMEGA-3  FATTY ACIDS) 300-1,000 mg capsule    pantoprazole (PROTONIX) 40 MG tablet    traMADoL (ULTRAM) 50 mg tablet    vitamin D3-folic acid 5,000 unit- 1 mg Tab    vitamin E 100 UNIT capsule    zinc gluconate 50 mg tablet     No current facility-administered medications for this visit.     Facility-Administered Medications Ordered in Other Visits   Medication Frequency    0.9%  NaCl infusion Continuous    mupirocin 2 % ointment On Call Procedure       ONCOLOGY HISTORY  Oncology History    No history exists.       Objective:  Felicia Ruffin arrived promptly for the session.  Ms. Ruffin was independently ambulatory at the time of session. The patient was fully cooperative throughout the session.  Appearance: age appropriate, appropriately  dressed, adequately  groomed  Behavior/Cooperation: friendly and cooperative  Speech: normal in rate, volume, and tone; talkative  Mood: irritable  Affect: mood congruent  Thought Process: goal-directed, logical  Thought Content: normal,  No delusions or paranoia; did not appear to be responding to internal stimuli during the session  Orientation: grossly intact  Memory: grossly intact  Attention Span/Concentration: Attends to session without distraction; reports no difficulty  Fund of Knowledge: average  Estimate of Intelligence: average from verbal skills and history  Cognition: grossly intact  Insight: patient has awareness of illness; good insight into own behavior and behavior of others  Judgment: the patient's behavior is adequate to circumstances    NCCN Distress thermometer:       1/4/2023     8:40 AM   DISTRESS SCREENING   Distress Score 7        Interval history and content of current session: Discussed diagnosis, treatment, prognosis, and current adaptation to disease and treatment status. Reports to be coping with significant difficulty. She continues to be frustrated with the lack of clarity regarding her treatment/prognosis. She reported wanting to live her life while she is  alive. She discussed activities she would like to do or accomplish while living her life (making cookies, house renovations, vacations). She has started taking Ritalin which has improved her energy levels significantly. She was encouraged to continue pacing her energy. Felicia Ruffin also discussed difficulties with intimacy with her .     Risk parameters:   Patient reports no suicidal ideation  Patient reports no homicidal ideation  Patient reports no self-injurious behavior  Patient reports no violent behavior   Safety needs:  None at this time      Verbal deficits: None     Patient's response to intervention:The patient's response to intervention is accepting.     Progress toward goals and other mental status changes:  The patient's progress toward goals is good.      Progress to date:Progress - Ongoing, but Slow      Goals from last visit: Attempted, not met        Patient Strengths: verbal, intelligent, successful, good insight, commitment to wellness, strong iron      Treatment Plan:individual psychotherapy  Target symptoms:  fatigue, frustration  Why chosen therapy is appropriate versus another modality: patient responds to this modality  Outcome monitoring methods: self-report  Therapeutic intervention type: supportive psychotherapy  Prognosis: Good      Behavioral goals:    Exercise: She is looking into starting PT again since she has more energy   Stress management: continue to ask her family for support with household responsibilities   Nutrition: be mindful of her negative thoughts before she eats about the likelihood of vomiting   Therapy: continue to pace her activities despite her increase in energy    Return to clinic: 1 month     Length of Service (minutes direct face-to-face contact): 60 minutes    Diagnosis:     ICD-10-CM ICD-9-CM   1. Adjustment disorder with mixed anxiety and depressed mood  F43.23 309.28                     Magda Ace, PhD  Clinical Health Psychology Fellow

## 2023-12-28 ENCOUNTER — OFFICE VISIT (OUTPATIENT)
Dept: PSYCHIATRY | Facility: CLINIC | Age: 58
End: 2023-12-28
Payer: OTHER GOVERNMENT

## 2023-12-28 DIAGNOSIS — F43.23 ADJUSTMENT DISORDER WITH MIXED ANXIETY AND DEPRESSED MOOD: Primary | ICD-10-CM

## 2023-12-28 PROCEDURE — 90837 PSYTX W PT 60 MINUTES: CPT | Mod: ,,,

## 2023-12-28 PROCEDURE — 90837 PR PSYCHOTHERAPY W/PATIENT, 60 MIN: ICD-10-PCS | Mod: ,,,

## 2024-02-15 ENCOUNTER — OFFICE VISIT (OUTPATIENT)
Dept: PSYCHIATRY | Facility: CLINIC | Age: 59
End: 2024-02-15
Payer: OTHER GOVERNMENT

## 2024-02-15 DIAGNOSIS — F43.23 ADJUSTMENT DISORDER WITH MIXED ANXIETY AND DEPRESSED MOOD: Primary | ICD-10-CM

## 2024-02-15 PROCEDURE — 90837 PSYTX W PT 60 MINUTES: CPT | Mod: ,,,

## 2024-02-15 NOTE — PROGRESS NOTES
PSYCHO-ONCOLOGY NOTE/ Individual Psychotherapy     Date: 2/15/2024   Site:  [facility]      Therapeutic Intervention: Met with patient.  Outpatient - Supportive psychotherapy 60 min - CPT Code 20079    This includes face to face time and non-face to face time preparing to see the patient, obtaining and/or reviewing separately obtained history, documenting clinical information in the electronic or other health record, independently interpreting results and communicating results to the patient/family/caregiver, or care coordinator.      Patient was last seen by me on 12/28/2023    Problem list  Patient Active Problem List   Diagnosis    HTN (hypertension)    Asthma    Pre-diabetes    Kidney stone    Chronic lumbar pain    Hyperlipemia    Anal fissure    Iron deficiency anemia, unspecified    OB + stool    Dysphagia    Gastric adenocarcinoma    Chemotherapy-induced peripheral neuropathy    Chemotherapy-induced fatigue    Insomnia       Chief complaint/reason for encounter: depression and anxiety   Met with patient to evaluate psychosocial adaptation to diagnosis/treatment/survivorship of gastric adenocarcinoma     Current Medications  Current Outpatient Medications   Medication    acetaminophen (TYLENOL) 500 MG tablet    aspirin 81 MG Chew    atorvastatin (LIPITOR) 20 MG tablet    azilsartan med-chlorthalidone (EDARBYCLOR) 40-25 mg Tab    budesonide-formoterol 80-4.5 mcg (SYMBICORT) 80-4.5 mcg/actuation HFAA    cetirizine (ZYRTEC) 10 MG tablet    COVID-19 vacc,mRNA,Moderna,-PF (SPIKEVAX, PF,) 100 mcg/0.5 mL Susp    docusate sodium (COLACE) 100 MG capsule    estradiol-norethindrone (COMBIPATCH) 0.05-0.14 mg/24 hr    hydrocortisone (ANUSOL-HC) 25 mg suppository    hydrocortisone 2.5 % cream    ibuprofen (ADVIL,MOTRIN) 400 MG tablet    ibuprofen (ADVIL,MOTRIN) 600 MG tablet    LIDOcaine (LIDODERM) 5 %    LINZESS 72 mcg Cap capsule    multivitamin (THERAGRAN) per tablet    omega-3 fatty acids/fish oil (FISH OIL-OMEGA-3  FATTY ACIDS) 300-1,000 mg capsule    pantoprazole (PROTONIX) 40 MG tablet    traMADoL (ULTRAM) 50 mg tablet    vitamin D3-folic acid 5,000 unit- 1 mg Tab    vitamin E 100 UNIT capsule    zinc gluconate 50 mg tablet     No current facility-administered medications for this visit.     Facility-Administered Medications Ordered in Other Visits   Medication Frequency    0.9%  NaCl infusion Continuous    mupirocin 2 % ointment On Call Procedure       ONCOLOGY HISTORY  Oncology History    No history exists.       Objective:  Felicia Ruffin arrived promptly for the session.  Ms. Ruffin was independently ambulatory at the time of session. The patient was fully cooperative throughout the session.  Appearance: age appropriate, appropriately  dressed, adequately  groomed  Behavior/Cooperation: friendly and cooperative  Speech: normal in rate, volume, and tone; talkative   Mood: irritable  Affect: mood congruent  Thought Process: goal-directed, logical  Thought Content: normal,  No delusions or paranoia; did not appear to be responding to internal stimuli during the session  Orientation: grossly intact  Memory: grossly intact  Attention Span/Concentration: Attends to session without distraction; reports no difficulty  Fund of Knowledge: average  Estimate of Intelligence: average from verbal skills and history  Cognition: grossly intact  Insight: patient has awareness of illness; good insight into own behavior and behavior of others  Judgment: the patient's behavior is adequate to circumstances    NCCN Distress thermometer:       1/4/2023     8:40 AM   DISTRESS SCREENING   Distress Score 7        Interval history and content of current session: Discussed treatment, current adaptation to disease and treatment status, and family's adaptation to disease and treatment status. Reports to be coping with significant difficulty. She has been experiencing GI-related difficulties which is making it difficult for her to eat. These issues  are also resulting in many hours in the bathroom, pain and being unable to engage in activities. Ms. Ruffin reported frustration related to her oncology team's response to her potential cancer progression. She continues to advocate for herself and asking questions to her providers. Identified and evaluated psychosocial and environmental stressors secondary to diagnosis and treatment. She is feeling overwhelmed by her son's college admissions process. He is still waiting to hear back from his top university even though his friends have been accepted. Discussed what is within her control and what is outside of her control. She was encouraged to remind herself of the steps she has taken and to remain present when her mind wanders to the future.      Risk parameters:   Patient reports no suicidal ideation  Patient reports no homicidal ideation  Patient reports no self-injurious behavior  Patient reports no violent behavior   Safety needs:  None at this time      Verbal deficits: None     Patient's response to intervention:The patient's response to intervention is reluctant.     Progress toward goals and other mental status changes:  The patient's progress toward goals is fair .      Progress to date:Progress - Ongoing, but Slow      Goals from last visit: Not attempted        Patient Strengths: verbal, intelligent, successful, good social support, good insight, commitment to wellness, strong iron      Treatment Plan:individual psychotherapy  Target symptoms: depression, anxiety   Why chosen therapy is appropriate versus another modality: patient responds to this modality  Outcome monitoring methods: self-report  Therapeutic intervention type: supportive psychotherapy  Prognosis: Fair      Behavioral goals:    Stress management: continue advocating for herself and asking questions to her providers; reaching out to her doctor's regarding her GI issues; reminding herself of the steps she has taken for uncontrollable  situations     Return to clinic: 6 weeks     Length of Service (minutes direct face-to-face contact): 60 minutes    Diagnosis:     ICD-10-CM ICD-9-CM   1. Adjustment disorder with mixed anxiety and depressed mood  F43.23 309.28                     Magda Ace, PhD  Clinical Health Psychology Fellow

## 2024-02-20 ENCOUNTER — TELEPHONE (OUTPATIENT)
Dept: GASTROENTEROLOGY | Facility: CLINIC | Age: 59
End: 2024-02-20
Payer: OTHER GOVERNMENT

## 2024-02-20 NOTE — TELEPHONE ENCOUNTER
Clinic appt scheduled with pt on Monday, February 26, 2024 at 2pm from referral by Dr. Barksdale.  Clinic address given and repeated correctly.

## 2024-02-20 NOTE — TELEPHONE ENCOUNTER
----- Message from Kesha Grimes sent at 2/19/2024  4:17 PM CST -----  Type:  Needs Medical Advice    Who Called: pt  Symptoms (please be specific): pt michele referral from Dr Barksdale needs to be seen please call back as soon as possible to get scheduled for appt l  HWould the patient rather a call back or a response via [facility]? call  Best Call Back Number: [phone]  Additional Information:

## 2024-02-26 ENCOUNTER — OFFICE VISIT (OUTPATIENT)
Dept: GASTROENTEROLOGY | Facility: CLINIC | Age: 59
End: 2024-02-26
Payer: OTHER GOVERNMENT

## 2024-02-26 VITALS
WEIGHT: 120 LBS | HEIGHT: 62 IN | HEART RATE: 85 BPM | DIASTOLIC BLOOD PRESSURE: 62 MMHG | SYSTOLIC BLOOD PRESSURE: 118 MMHG | BODY MASS INDEX: 22.08 KG/M2

## 2024-02-26 DIAGNOSIS — K22.0 ACHALASIA: Primary | ICD-10-CM

## 2024-02-26 PROCEDURE — 99999 PR PBB SHADOW E&M-EST. PATIENT-LVL V: CPT | Mod: PBBFAC,,, | Performed by: INTERNAL MEDICINE

## 2024-02-26 PROCEDURE — 99215 OFFICE O/P EST HI 40 MIN: CPT | Mod: PBBFAC,PO | Performed by: INTERNAL MEDICINE

## 2024-02-26 PROCEDURE — 99204 OFFICE O/P NEW MOD 45 MIN: CPT | Mod: S$PBB,,, | Performed by: INTERNAL MEDICINE

## 2024-02-26 RX ORDER — SUCRALFATE 1 G/10ML
1 SUSPENSION ORAL 4 TIMES DAILY
COMMUNITY

## 2024-02-26 RX ORDER — LACTULOSE 10 G/15ML
SOLUTION ORAL; RECTAL 3 TIMES DAILY
COMMUNITY

## 2024-02-26 RX ORDER — HYDROCODONE BITARTRATE AND IBUPROFEN 5; 200 MG/1; MG/1
1 TABLET ORAL EVERY 8 HOURS PRN
COMMUNITY

## 2024-02-26 RX ORDER — METHYLPHENIDATE HYDROCHLORIDE 20 MG/1
20 TABLET ORAL 2 TIMES DAILY
COMMUNITY

## 2024-02-26 NOTE — PATIENT INSTRUCTIONS
You are scheduled for an EGD on ____Monday, March 4, 2024 at [facility] at [address] [place] [address], [place]  [id number] _____________________________    You should eat light meals the day before the procedure and nothing to eat or drink after midnight the night before your procedure.    You will need to be at the 1st floor admission desk at the hospital on ___Endoscopy staff will contact 2-3 days prior to procedure to give arrival time._____________________

## 2024-02-26 NOTE — PROGRESS NOTES
"[facility]      HPI 58 y.o. female with PMH significant for metastatic (peritoneum) gastric adenocarcinoma s/p diagnostic laparoscopy and chemo    Follows with GI on [facility]. Went to [facility] who did last lap with sudden onset of emesis afterwards. Never had an issue prior. Of note, had to stop oxaliplatin due to severe neuropathy then switched to irinotecan and 5-FU. However, her last dose of irinotecan due to neuropathy as well. Has been told her gastric adenoCA has decreased in size with this regimen. Denies ever having n/v with chemo. Pyloric botox was attempted to be ordered, however, her insurance denied since she "does not have achalasia," hence, is coming here to see if her insurance will cover it at this hospital.    Her most bothersome GI symptom is dysphagia associated with regurgitation which is seldomly associated with nausea. Has had unintentional weight loss subsequently and has a strong association with esophageal dysphagia to solids and liqudis. Has tried Protonix, Carafate, Reglan which have not helped her symptoms. Has not never noted a minimal improvement. Has been having to eat purees and liquids to help maintain calories. Other notable GI symptoms include constipation with hemorrhoidal bleeding and anal fissures for which she required botox 9/2023. Constipation has progressively worsened in past year. GES with 79% gastric contents at 90 min.    Notable home meds: advil, chemo, linzess 72 mcg, ritalin, protonix 40 qd, lactulose, carafate    Endo Hx:  EGD 2022 - with Dr. Montanez; remarkable for mild esophagitis, gastric tumor (adenoCA)  Colonoscopy 2020 - with Dr. Thao; remarkable for 4 sub-centimeter polyps, internal hemorrhoids, and congested mucosa in transverse colon; no interval advised in EMR    Past Medical History  Past Medical History:   Diagnosis Date    Allergy     Asthma     Hyperlipidemia     Hypertension     Lumbago     PONV (postoperative nausea and vomiting)     " "Pre-diabetes     Skin sensitivity     Vitamin D deficiency          Physical Examination  /62 (BP Location: Left arm, Patient Position: Sitting, BP Method: Medium (Automatic))   Pulse 85   Ht 5' 2" (1.575 m)   Wt 54.4 kg (120 lb)   LMP 07/01/2017   BMI 21.95 kg/m²   General appearance: alert, cooperative, no distress  Abdomen: soft, tender RUQ over scar; bowel sounds normoactive; no organomegaly      Assessment:   58 y.o. female with PMH significant for metastatic (peritoneum) gastric adenocarcinoma s/p diagnostic laparoscopy and chemo here for severe intermittent regurgitation of solids and liquids but not pills with notable esophageal dysphagia. Started after last surgery and does not recall any association with chemo. Has tried carafate, PPI, and reglan without relief. Has severe constipation complicated by hemorrhoidal bleeding and anal fissures requiring anal botox. Had a GES with 79% retention at 90 minutes. Etiology of GI symptoms are consistent with acquired achalasia which is most likely multifactorial (meds [as listed above], co-morbidities/gastric cancer, possible vagus nerve injury post-op, DGBI/stress)      Plan:  -EGD on 2/29 with botox injections into esophagus and maybe into pylorus if have remaining botox; will need 100 units Botox total with 20-25 units into each quadrant of esophagus  -does not need to hold any meds pre endoscopy      Sixto Baumann MD   [address] [place] [address], [address]  [facility] [address] [phone]  "

## 2024-02-29 ENCOUNTER — TELEPHONE (OUTPATIENT)
Dept: ENDOSCOPY | Facility: HOSPITAL | Age: 59
End: 2024-02-29
Payer: OTHER GOVERNMENT

## 2024-02-29 NOTE — TELEPHONE ENCOUNTER
Spoke with patient about arrival time @ 0900.   EGD w/botox inj    NPO status reviewed: Light meals on Sunday, 03/03/24. Patient must have nothing to eat after midnight.      Medications: Do not take Insulin or oral diabetic medications the day of the procedure.  Take as prescribed: heart, seizure and blood pressure medication in the morning with a sip of water (less than an ounce).  Take any breathing medications and bring inhalers to hospital with you Leave all valuables and jewelry at home.     Wear comfortable clothes to procedure to change into hospital gown You cannot drive for 24 hours after your procedure because you will receive sedation for your procedure to make you comfortable.  A ride must be provided at discharge.      Patient request use of port for IV.  Pharmacy notified of request for botox.

## 2024-03-04 ENCOUNTER — ANESTHESIA (OUTPATIENT)
Dept: ENDOSCOPY | Facility: HOSPITAL | Age: 59
End: 2024-03-04
Payer: OTHER GOVERNMENT

## 2024-03-04 ENCOUNTER — HOSPITAL ENCOUNTER (OUTPATIENT)
Facility: HOSPITAL | Age: 59
Discharge: HOME | End: 2024-03-04
Attending: INTERNAL MEDICINE | Admitting: INTERNAL MEDICINE
Payer: OTHER GOVERNMENT

## 2024-03-04 ENCOUNTER — ANESTHESIA EVENT (OUTPATIENT)
Dept: ENDOSCOPY | Facility: HOSPITAL | Age: 59
End: 2024-03-04
Payer: OTHER GOVERNMENT

## 2024-03-04 VITALS
OXYGEN SATURATION: 99 % | RESPIRATION RATE: 13 BRPM | HEIGHT: 62 IN | BODY MASS INDEX: 21.35 KG/M2 | HEART RATE: 79 BPM | TEMPERATURE: 99 F | SYSTOLIC BLOOD PRESSURE: 141 MMHG | DIASTOLIC BLOOD PRESSURE: 67 MMHG | WEIGHT: 116 LBS

## 2024-03-04 DIAGNOSIS — K22.0 ACHALASIA: Primary | ICD-10-CM

## 2024-03-04 PROCEDURE — D9220A PRA ANESTHESIA: Mod: CRNA,,, | Performed by: NURSE ANESTHETIST, CERTIFIED REGISTERED

## 2024-03-04 PROCEDURE — 25000003 PHARM REV CODE 250: Performed by: NURSE ANESTHETIST, CERTIFIED REGISTERED

## 2024-03-04 PROCEDURE — 88342 IMHCHEM/IMCYTCHM 1ST ANTB: CPT | Mod: 26,59,, | Performed by: PATHOLOGY

## 2024-03-04 PROCEDURE — 37000009 HC ANESTHESIA EA ADD 15 MINS: Performed by: INTERNAL MEDICINE

## 2024-03-04 PROCEDURE — 88342 IMHCHEM/IMCYTCHM 1ST ANTB: CPT | Performed by: PATHOLOGY

## 2024-03-04 PROCEDURE — 88305 TISSUE EXAM BY PATHOLOGIST: CPT | Performed by: PATHOLOGY

## 2024-03-04 PROCEDURE — 88305 TISSUE EXAM BY PATHOLOGIST: CPT | Mod: 26,,, | Performed by: PATHOLOGY

## 2024-03-04 PROCEDURE — 88360 TUMOR IMMUNOHISTOCHEM/MANUAL: CPT | Performed by: PATHOLOGY

## 2024-03-04 PROCEDURE — 88360 TUMOR IMMUNOHISTOCHEM/MANUAL: CPT | Mod: 26,,, | Performed by: PATHOLOGY

## 2024-03-04 PROCEDURE — D9220A PRA ANESTHESIA: Mod: ANES,,, | Performed by: ANESTHESIOLOGY

## 2024-03-04 PROCEDURE — 88377 M/PHMTRC ALYS ISHQUANT/SEMIQ: CPT | Performed by: PATHOLOGY

## 2024-03-04 PROCEDURE — 43239 EGD BIOPSY SINGLE/MULTIPLE: CPT | Performed by: INTERNAL MEDICINE

## 2024-03-04 PROCEDURE — 37000008 HC ANESTHESIA 1ST 15 MINUTES: Performed by: INTERNAL MEDICINE

## 2024-03-04 PROCEDURE — 88341 IMHCHEM/IMCYTCHM EA ADD ANTB: CPT | Performed by: PATHOLOGY

## 2024-03-04 PROCEDURE — 25000003 PHARM REV CODE 250: Performed by: INTERNAL MEDICINE

## 2024-03-04 PROCEDURE — 63600175 PHARM REV CODE 636 W HCPCS: Performed by: NURSE ANESTHETIST, CERTIFIED REGISTERED

## 2024-03-04 PROCEDURE — 43236 UPPR GI SCOPE W/SUBMUC INJ: CPT | Mod: 59 | Performed by: INTERNAL MEDICINE

## 2024-03-04 PROCEDURE — 63600175 PHARM REV CODE 636 W HCPCS: Mod: JZ,JG | Performed by: INTERNAL MEDICINE

## 2024-03-04 PROCEDURE — 88341 IMHCHEM/IMCYTCHM EA ADD ANTB: CPT | Mod: 26,59,, | Performed by: PATHOLOGY

## 2024-03-04 PROCEDURE — 27201028 HC NEEDLE, SCLERO: Performed by: INTERNAL MEDICINE

## 2024-03-04 RX ORDER — DEXMEDETOMIDINE HYDROCHLORIDE 100 UG/ML
INJECTION, SOLUTION INTRAVENOUS
Status: DISCONTINUED | OUTPATIENT
Start: 2024-03-04 | End: 2024-03-04

## 2024-03-04 RX ORDER — SODIUM CHLORIDE 0.9 % (FLUSH) 0.9 %
10 SYRINGE (ML) INJECTION
Status: DISCONTINUED | OUTPATIENT
Start: 2024-03-04 | End: 2024-03-04 | Stop reason: HOSPADM

## 2024-03-04 RX ORDER — PROPOFOL 10 MG/ML
VIAL (ML) INTRAVENOUS CONTINUOUS PRN
Status: DISCONTINUED | OUTPATIENT
Start: 2024-03-04 | End: 2024-03-04

## 2024-03-04 RX ORDER — SODIUM CHLORIDE 9 MG/ML
INJECTION, SOLUTION INTRAVENOUS CONTINUOUS
Status: DISCONTINUED | OUTPATIENT
Start: 2024-03-04 | End: 2024-03-04 | Stop reason: HOSPADM

## 2024-03-04 RX ORDER — PROPOFOL 10 MG/ML
VIAL (ML) INTRAVENOUS
Status: DISCONTINUED | OUTPATIENT
Start: 2024-03-04 | End: 2024-03-04

## 2024-03-04 RX ORDER — HEPARIN 100 UNIT/ML
100 SYRINGE INTRAVENOUS ONCE
Status: DISCONTINUED | OUTPATIENT
Start: 2024-03-04 | End: 2024-03-04 | Stop reason: HOSPADM

## 2024-03-04 RX ORDER — LIDOCAINE HYDROCHLORIDE 20 MG/ML
INJECTION, SOLUTION EPIDURAL; INFILTRATION; INTRACAUDAL; PERINEURAL
Status: DISCONTINUED | OUTPATIENT
Start: 2024-03-04 | End: 2024-03-04

## 2024-03-04 RX ADMIN — GLYCOPYRROLATE 0.2 MG: 0.2 INJECTION, SOLUTION INTRAMUSCULAR; INTRAVITREAL at 01:03

## 2024-03-04 RX ADMIN — TOPICAL ANESTHETIC 1 EACH: 200 SPRAY DENTAL; PERIODONTAL at 01:03

## 2024-03-04 RX ADMIN — DEXMEDETOMIDINE HYDROCHLORIDE 4 MCG: 100 INJECTION, SOLUTION, CONCENTRATE INTRAVENOUS at 01:03

## 2024-03-04 RX ADMIN — PROPOFOL 150 MCG/KG/MIN: 10 INJECTION, EMULSION INTRAVENOUS at 01:03

## 2024-03-04 RX ADMIN — PROPOFOL 70 MG: 10 INJECTION, EMULSION INTRAVENOUS at 01:03

## 2024-03-04 RX ADMIN — ONABOTULINUMTOXINA 100 UNITS: 100 INJECTION, POWDER, LYOPHILIZED, FOR SOLUTION INTRADERMAL; INTRAMUSCULAR at 01:03

## 2024-03-04 RX ADMIN — SODIUM CHLORIDE: 9 INJECTION, SOLUTION INTRAVENOUS at 10:03

## 2024-03-04 RX ADMIN — LIDOCAINE HYDROCHLORIDE 80 MG: 20 INJECTION, SOLUTION EPIDURAL; INFILTRATION; INTRACAUDAL; PERINEURAL at 01:03

## 2024-03-04 RX ADMIN — DEXMEDETOMIDINE HYDROCHLORIDE 8 MCG: 100 INJECTION, SOLUTION, CONCENTRATE INTRAVENOUS at 01:03

## 2024-03-04 NOTE — ANESTHESIA PREPROCEDURE EVALUATION
2024  Felicia Ruffin is a 58 y.o., female.    Patient Active Problem List   Diagnosis    HTN (hypertension)    Asthma    Pre-diabetes    Kidney stone    Chronic lumbar pain    Hyperlipemia    Anal fissure    Iron deficiency anemia, unspecified    OB + stool    Dysphagia    Gastric adenocarcinoma    Chemotherapy-induced peripheral neuropathy    Chemotherapy-induced fatigue    Insomnia     Past Surgical History:   Procedure Laterality Date    ANOSCOPY N/A 2023    Procedure: ANOSCOPY;  Surgeon: MARICRUZ Contreras MD;  Location: [facility] (4TH FLR);  Service: Endoscopy;  Laterality: N/A;  ok per DR. Titus w/ anesthesia to extend block by 2o min - and the ok to add on case per Diane     SECTION      times 1    COLONOSCOPY N/A 10/02/2020    Procedure: COLONOSCOPY;  Surgeon: Ortega Thao MD;  Location: [facility];  Service: Endoscopy;  Laterality: N/A;    DIAGNOSTIC LAPAROSCOPY N/A 2022    Procedure: LAPAROSCOPY, DIAGNOSTIC;  Surgeon: Tee Porter MD;  Location: [facility] OR [address] [place] [address];  Service: General;  Laterality: N/A;  Diag lap and port insertion    DILATION AND CURETTAGE OF UTERUS      ESOPHAGOGASTRODUODENOSCOPY N/A 10/18/2022    Procedure: EGD (ESOPHAGOGASTRODUODENOSCOPY);  Surgeon: CLINT Montanez MD;  Location: [facility];  Service: Endoscopy;  Laterality: N/A;    INSERTION OF TUNNELED CENTRAL VENOUS CATHETER (CVC) WITH SUBCUTANEOUS PORT Right 2022    Procedure: INSERTION, SINGLE LUMEN CATHETER WITH PORT, WITH FLUOROSCOPIC GUIDANCE;  Surgeon: Tee Porter MD;  Location: [facility] OR [address] [place] [address];  Service: General;  Laterality: Right;  Diag lap and port insertion    LAPAROSCOPY  2023    TX LAPS ABD PRTM&OMENTUM DX W/WO SPEC BR/WA SPX    LATERAL INTERNAL ANAL SPHINCTEROTOMY N/A 2020    Procedure: SPHINCTEROTOMY, ANAL, INTERNAL, LATERAL;  Surgeon: Brigido GRAY  MD Diane;  Location: [facility] OR [address] [place] [address];  Service: Colon and Rectal;  Laterality: N/A;    tonsillectomy      TONSILLECTOMY     Pre-op Assessment       I have reviewed the Medications.     Review of Systems  Anesthesia Hx:             Denies Family Hx of Anesthesia complications.     Hematology/Oncology:                      Current/Recent Cancer.         chemotherapy       Cardiovascular:     Hypertension                                        Pulmonary:    Asthma                    Renal/:  Chronic Renal Disease                Hepatic/GI:    Esophageal / Stomach Disorders Gastric Conditions:, Gastric Cancer          Neurological:    Neuromuscular Disease,                                       Physical Exam  General: Well nourished    Airway:  Mallampati: II   TM Distance: Normal  Neck ROM: Normal ROM    Dental:  Intact    Chest/Lungs:  Clear to auscultation    Heart:  Rate: Normal  Rhythm: Regular Rhythm        Anesthesia Plan  Type of Anesthesia, risks & benefits discussed:    Anesthesia Type: Gen Natural Airway  Intra-op Monitoring Plan: Standard ASA Monitors  Post Op Pain Control Plan: multimodal analgesia  Informed Consent: Informed consent signed with the Patient and all parties understand the risks and agree with anesthesia plan.  All questions answered.   ASA Score: 3    Ready For Surgery From Anesthesia Perspective.     .

## 2024-03-04 NOTE — H&P
"[facility]     CC: gastric adenoCA     HPI 58 y.o. female with PMH significant for metastatic (peritoneum) gastric adenocarcinoma s/p diagnostic laparoscopy and chemo.     Follows with GI on [facility]. Went to [facility] who did last lap with sudden onset of emesis afterwards. Never had an issue prior. Of note, had to stop oxaliplatin due to severe neuropathy then switched to irinotecan and 5-FU. However, her last dose of irinotecan due to neuropathy as well. Has been told her gastric adenoCA has decreased in size with this regimen. Denies ever having n/v with chemo. Pyloric botox was attempted to be ordered, however, her insurance denied since she "does not have achalasia," hence, is coming here to see if her insurance will cover it at this hospital.     Her most bothersome GI symptom is dysphagia associated with regurgitation which is seldomly associated with nausea. Has had unintentional weight loss subsequently and has a strong association with esophageal dysphagia to solids and liqudis. Has tried Protonix, Carafate, Reglan which have not helped her symptoms. Has not never noted a minimal improvement. Has been having to eat purees and liquids to help maintain calories. Other notable GI symptoms include constipation with hemorrhoidal bleeding and anal fissures for which she required botox 9/2023. Constipation has progressively worsened in past year. GES with 79% gastric contents at 90 min.     Notable home meds: advil, chemo, linzess 72 mcg, ritalin, protonix 40 qd, lactulose, carafate     Endo Hx:  EGD 2022 - with Dr. Montanez; remarkable for mild esophagitis, gastric tumor (adenoCA)  Colonoscopy 2020 - with Dr. Thao; remarkable for 4 sub-centimeter polyps, internal hemorrhoids, and congested mucosa in transverse colon; no interval advised in EMR     Past Medical History  Asthma  Metastatic gastric cancer  HTN       Physical Examination  There were no vitals filed for this visit.    General " appearance: alert, cooperative, no distress  Abdomen: soft, tender RUQ over scar; bowel sounds normoactive; no organomegaly        Assessment:   58 y.o. female with PMH significant for metastatic (peritoneum) gastric adenocarcinoma s/p diagnostic laparoscopy and chemo here for severe intermittent regurgitation of solids and liquids but not pills with notable esophageal dysphagia. Started after last surgery and does not recall any association with chemo. Has tried carafate, PPI, and reglan without relief. Has severe constipation complicated by hemorrhoidal bleeding and anal fissures requiring anal botox. Had a GES with 79% retention at 90 minutes. Etiology of GI symptoms are consistent with acquired achalasia which is most likely multifactorial (meds [as listed above], co-morbidities/gastric cancer, possible vagus nerve injury post-op, DGBI/stress)        Plan:  -EGD today with botox injections into esophagus and maybe into pylorus if have remaining botox; will need 100 units Botox total with 20-25 units into each quadrant of esophagus    Sixto Baumann MD   [address] [place] [address], [address]  [facility] [place] [address] [phone]

## 2024-03-04 NOTE — TRANSFER OF CARE
"Anesthesia Transfer of Care Note    Patient: Felicia Ruffin    Procedure(s) Performed: Procedure(s) (LRB):  EGD (ESOPHAGOGASTRODUODENOSCOPY) (N/A)    Patient location: GI    Anesthesia Type: general    Transport from OR: Transported from OR on room air with adequate spontaneous ventilation    Post pain: adequate analgesia    Post assessment: no apparent anesthetic complications    Post vital signs: stable    Level of consciousness: awake    Nausea/Vomiting: no nausea/vomiting    Complications: none    Transfer of care protocol was followed      Last vitals: Visit Vitals  BP (!) 134/95 (BP Location: Left arm, Patient Position: Lying)   Pulse 64   Temp 36.5 °C (97.7 °F) (Skin)   Resp 18   Ht 5' 2" (1.575 m)   Wt 52.6 kg (116 lb)   LMP 07/01/2017   SpO2 99%   Breastfeeding No   BMI 21.22 kg/m²     "

## 2024-03-04 NOTE — ANESTHESIA POSTPROCEDURE EVALUATION
Anesthesia Post Evaluation    Patient: Felicia Ruffin    Procedure(s) Performed: Procedure(s) (LRB):  EGD (ESOPHAGOGASTRODUODENOSCOPY) (N/A)    Final Anesthesia Type: general      Patient location during evaluation: PACU  Patient participation: Yes- Able to Participate  Level of consciousness: awake and alert  Post-procedure vital signs: reviewed and stable  Pain management: adequate  Airway patency: patent    PONV status at discharge: No PONV  Anesthetic complications: no      Cardiovascular status: blood pressure returned to baseline  Respiratory status: unassisted  Hydration status: euvolemic  Follow-up not needed.          Vitals Value Taken Time   /67 03/04/24 1414   Temp 37.1 °C (98.7 °F) 03/04/24 1344   Pulse 79 03/04/24 1414   Resp 13 03/04/24 1414   SpO2 99 % 03/04/24 1414         Event Time   Out of Recovery 14:16:03         Pain/Lana Score: Lana Score: 10 (3/4/2024  2:15 PM)

## 2024-03-04 NOTE — [employer] PATIENT INSTRUCTIONS
Discharge Summary/Instructions after an Endoscopic Procedure  Patient Name: Felicia Ruffin  Patient MRN: [id number]  Patient YOB: 1965 Monday, March 4, 2024  Sixto Baumann MD  Dear patient,  As a result of recent federal legislation (The Federal Cures Act), you may   receive lab or pathology results from your procedure in your [facility]   account before your physician is able to contact you. Your physician or   their representative will relay the results to you with their   recommendations at their soonest availability.  Thank you,  Your health is very important to us during the Covid Crisis. Following your   procedure today, you will receive a daily text for 2 weeks asking about   signs or symptoms of Covid 19.  Please respond to this text when you   receive it so we can follow up and keep you as safe as possible.   RESTRICTIONS:  During your procedure today, you received medications for sedation.  These   medications may affect your judgment, balance and coordination.  Therefore,   for 24 hours, you have the following restrictions:   - DO NOT drive a car, operate machinery, make legal/financial decisions,   sign important papers or drink alcohol.    ACTIVITY:  Today: no heavy lifting, straining or running due to procedural   sedation/anesthesia.  The following day: return to full activity including work.  DIET:  Eat and drink normally unless instructed otherwise.     TREATMENT FOR COMMON SIDE EFFECTS:  - Mild abdominal pain, nausea, belching, bloating or excessive gas:  rest,   eat lightly and use a heating pad.  - Sore Throat: treat with throat lozenges and/or gargle with warm salt   water.  - Because air was used during the procedure, expelling large amounts of air   from your rectum or belching is normal.  - If a bowel prep was taken, you may not have a bowel movement for 1-3 days.    This is normal.  SYMPTOMS TO WATCH FOR AND REPORT TO YOUR PHYSICIAN:  1. Abdominal pain or bloating, other than gas  cramps.  2. Chest pain.  3. Back pain.  4. Signs of infection such as: chills or fever occurring within 24 hours   after the procedure.  5. Rectal bleeding, which would show as bright red, maroon, or black stools.   (A tablespoon of blood from the rectum is not serious, especially if   hemorrhoids are present.)  6. Vomiting.  7. Weakness or dizziness.  GO DIRECTLY TO THE NEAREST EMERGENCY ROOM IF YOU HAVE ANY OF THE FOLLOWING:      Difficulty breathing              Chills and/or fever over 101 F   Persistent vomiting and/or vomiting blood   Severe abdominal pain   Severe chest pain   Black, tarry stools   Bleeding- more than one tablespoon   Any other symptom or condition that you feel may need urgent attention  Your doctor recommends these additional instructions:  If any biopsies were taken, your doctors clinic will contact you in 1 to 2   weeks with any results.  - Discharge patient to home (ambulatory).   - Resume previous diet.   - Continue present medications.   - Await pathology results.   - Next steps in care include esophageal stent (palliative), surgical   resection, or chemo/radiation of esopahgeal stenosis to assist with nausea   and vomiting.  For questions, problems or results please call your physician - Sixto Baumann MD.  EMERGENCY PHONE NUMBER: [phone],  LAB RESULTS: [phone]  IF A COMPLICATION OR EMERGENCY SITUATION ARISES AND YOU ARE UNABLE TO REACH   YOUR PHYSICIAN - GO DIRECTLY TO THE EMERGENCY ROOM.  MD Sixto Oviedo MD  3/4/2024 1:57:04 PM  This report has been verified and signed electronically.  Dear patient,  As a result of recent federal legislation (The Federal Cures Act), you may   receive lab or pathology results from your procedure in your [facility]   account before your physician is able to contact you. Your physician or   their representative will relay the results to you with their   recommendations at their soonest availability.  Thank  you,  [employer]OVATION

## 2024-03-07 ENCOUNTER — PATIENT MESSAGE (OUTPATIENT)
Dept: GASTROENTEROLOGY | Facility: CLINIC | Age: 59
End: 2024-03-07
Payer: OTHER GOVERNMENT

## 2024-03-10 PROBLEM — K22.0 ACHALASIA: Status: ACTIVE | Noted: 2024-03-10

## 2024-03-19 LAB
FINAL PATHOLOGIC DIAGNOSIS: ABNORMAL
GROSS: ABNORMAL
Lab: ABNORMAL
MICROSCOPIC EXAM: ABNORMAL
SUPPLEMENTAL DIAGNOSIS: ABNORMAL

## 2024-03-28 ENCOUNTER — OFFICE VISIT (OUTPATIENT)
Dept: PSYCHIATRY | Facility: CLINIC | Age: 59
End: 2024-03-28
Payer: OTHER GOVERNMENT

## 2024-03-28 DIAGNOSIS — F43.23 ADJUSTMENT DISORDER WITH MIXED ANXIETY AND DEPRESSED MOOD: Primary | ICD-10-CM

## 2024-03-28 PROCEDURE — 90837 PSYTX W PT 60 MINUTES: CPT | Mod: ,,,

## 2024-03-28 NOTE — PROGRESS NOTES
PSYCHO-ONCOLOGY NOTE/ Individual Psychotherapy     Date: 3/28/2024   Site:  [facility]      Therapeutic Intervention: Met with patient.  Outpatient - Supportive psychotherapy 60 min - CPT Code 88404    This includes face to face time and non-face to face time preparing to see the patient, obtaining and/or reviewing separately obtained history, documenting clinical information in the electronic or other health record, independently interpreting results and communicating results to the patient/family/caregiver, or care coordinator.      Patient was last seen by me on 2/15/2024    Problem list  Patient Active Problem List   Diagnosis    HTN (hypertension)    Asthma    Pre-diabetes    Kidney stone    Chronic lumbar pain    Hyperlipemia    Anal fissure    Iron deficiency anemia, unspecified    OB + stool    Dysphagia    Gastric adenocarcinoma    Chemotherapy-induced peripheral neuropathy    Chemotherapy-induced fatigue    Insomnia    Achalasia       Chief complaint/reason for encounter: anxiety and frustration   Met with patient to evaluate psychosocial adaptation to diagnosis/treatment/survivorship of gastric adenocarcinoma     Current Medications  Current Outpatient Medications   Medication    acetaminophen (TYLENOL) 500 MG tablet    aspirin 81 MG Chew    atorvastatin (LIPITOR) 20 MG tablet    azilsartan med-chlorthalidone (EDARBYCLOR) 40-25 mg Tab    budesonide-formoterol 80-4.5 mcg (SYMBICORT) 80-4.5 mcg/actuation HFAA    cetirizine (ZYRTEC) 10 MG tablet    COVID-19 vacc,mRNA,Moderna,-PF (SPIKEVAX, PF,) 100 mcg/0.5 mL Susp    docusate sodium (COLACE) 100 MG capsule    estradiol-norethindrone (COMBIPATCH) 0.05-0.14 mg/24 hr    HYDROcodone-ibuprofen (VICOPROFEN) 5-200 mg per tablet    hydrocortisone (ANUSOL-HC) 25 mg suppository    hydrocortisone 2.5 % cream    lactulose (CHRONULAC) 10 gram/15 mL solution    LIDOcaine (LIDODERM) 5 %    LINZESS 72 mcg Cap capsule    methylphenidate HCl (RITALIN) 20 MG tablet     multivitamin (THERAGRAN) per tablet    omega-3 fatty acids/fish oil (FISH OIL-OMEGA-3 FATTY ACIDS) 300-1,000 mg capsule    pantoprazole (PROTONIX) 40 MG tablet    sucralfate (CARAFATE) 100 mg/mL suspension    traMADoL (ULTRAM) 50 mg tablet    vitamin D3-folic acid 5,000 unit- 1 mg Tab    vitamin E 100 UNIT capsule    zinc gluconate 50 mg tablet     Current Facility-Administered Medications   Medication Frequency    onabotulinumtoxina injection 100 Units Once     Facility-Administered Medications Ordered in Other Visits   Medication Frequency    0.9%  NaCl infusion Continuous    mupirocin 2 % ointment On Call Procedure       ONCOLOGY HISTORY  Oncology History    No history exists.       Objective:  Felicia Ruffin arrived early for the session.   Ms. Ruffin was independently ambulatory at the time of session. The patient was fully cooperative throughout the session.  Appearance: age appropriate, appropriately  dressed, adequately  groomed  Behavior/Cooperation: friendly and cooperative  Speech: normal in rate, volume, and tone  Mood: irritable, sad  Affect: mood congruent and appropriate  Thought Process: goal-directed, logical  Thought Content: normal,  No delusions or paranoia; did not appear to be responding to internal stimuli during the session  Orientation: grossly intact  Memory: grossly intact  Attention Span/Concentration: Attends to session without distraction; reports no difficulty  Fund of Knowledge: average  Estimate of Intelligence: average from verbal skills and history  Cognition: grossly intact  Insight: patient has awareness of illness; good insight into own behavior and behavior of others  Judgment: the patient's behavior is adequate to circumstances    NCCN Distress thermometer:       1/4/2023     8:40 AM   DISTRESS SCREENING   Distress Score 7        Interval history and content of current session: Discussed treatment, current adaptation to disease and treatment status, and family's adaptation to  disease and treatment status. Reports to be coping with moderate difficulty. Ms. Ruffin has been staying at her sister's home due to her recent hospitalization and to monitor her potential side effects to her feeding tube. Although she wants to go back to her home, she is not sure this is the healthiest option at this time. Her son and  continue to be dependent on Ms. Ruffin and are not completing tasks on their own (cooking, cleaning, paying bills) which is frustrating for her. Ms. Ruffin was introduced to the idea of natural consequences and attempting to let go of control to help them gain more independence. She noted frustrations with and confusion surrounding her medical treatment. Her goal is to find a cure for her cancer, despite her disease progression. She discussed her feelings surrounding death.      Risk parameters:   Patient reports no suicidal ideation  Patient reports no homicidal ideation  Patient reports no self-injurious behavior  Patient reports no violent behavior   Safety needs:  None at this time      Verbal deficits: None     Patient's response to intervention:The patient's response to intervention is reluctant.     Progress toward goals and other mental status changes:  The patient's progress toward goals is fair .      Progress to date:Progress - Ongoing, but Slow      Goals from last visit: Attempted, partially met        Patient Strengths: verbal, intelligent, successful, good social support, good insight, commitment to wellness, strong iron      Treatment Plan:individual psychotherapy  Target symptoms: anxiety , frustration  Why chosen therapy is appropriate versus another modality: patient responds to this modality  Outcome monitoring methods: self-report  Therapeutic intervention type: supportive psychotherapy  Prognosis: Fair      Behavioral goals:    Stress management: attempt to allow her family to take care of themselves, prioritize what is most important to her and  compromise when needed     Return to clinic: 1 month     Length of Service (minutes direct face-to-face contact): 60 minutes    Diagnosis:     ICD-10-CM ICD-9-CM   1. Adjustment disorder with mixed anxiety and depressed mood  F43.23 309.28                     Magda Ace, PhD  Clinical Health Psychology Fellow

## 2024-05-02 ENCOUNTER — TELEPHONE (OUTPATIENT)
Dept: PSYCHIATRY | Facility: CLINIC | Age: 59
End: 2024-05-02
Payer: OTHER GOVERNMENT

## 2024-05-09 ENCOUNTER — OFFICE VISIT (OUTPATIENT)
Dept: PSYCHIATRY | Facility: CLINIC | Age: 59
End: 2024-05-09
Payer: OTHER GOVERNMENT

## 2024-05-09 DIAGNOSIS — F43.23 ADJUSTMENT DISORDER WITH MIXED ANXIETY AND DEPRESSED MOOD: Primary | ICD-10-CM

## 2024-05-09 PROCEDURE — 90834 PSYTX W PT 45 MINUTES: CPT | Mod: 95,,,

## 2024-05-09 NOTE — PROGRESS NOTES
PSYCHO-ONCOLOGY NOTE/ Individual Psychotherapy     Date: 5/9/2024   Site:  [place], [place]      Therapeutic Intervention: Met with patient.  Outpatient - Supportive psychotherapy 45 min - CPT Code 02985    This includes face to face time and non-face to face time preparing to see the patient, obtaining and/or reviewing separately obtained history, documenting clinical information in the electronic or other health record, independently interpreting results and communicating results to the patient/family/caregiver, or care coordinator.    The patient location is: [place], [place]  The chief complaint leading to consultation is: follow up therapy    Visit type: audiovisual    Face to Face time with patient: 40 minutes  46 minutes of total time spent on the encounter, which includes face to face time and non-face to face time preparing to see the patient (eg, review of tests), Obtaining and/or reviewing separately obtained history, Documenting clinical information in the electronic or other health record, Independently interpreting results (not separately reported) and communicating results to the patient/family/caregiver, or Care coordination (not separately reported).         Each patient to whom he or she provides medical services by telemedicine is:  (1) informed of the relationship between the physician and patient and the respective role of any other health care provider with respect to management of the patient; and (2) notified that he or she may decline to receive medical services by telemedicine and may withdraw from such care at any time.    Patient was last seen by me on 3/28/2024    Problem list  Patient Active Problem List   Diagnosis    HTN (hypertension)    Asthma    Pre-diabetes    Kidney stone    Chronic lumbar pain    Hyperlipemia    Anal fissure    Iron deficiency anemia, unspecified    OB + stool    Dysphagia    Gastric adenocarcinoma    Chemotherapy-induced peripheral neuropathy     Chemotherapy-induced fatigue    Insomnia    Achalasia       Chief complaint/reason for encounter: fatigue/energy preservation   Met with patient to evaluate psychosocial adaptation to diagnosis/treatment/survivorship of gastric adenocarcinoma     Current Medications  Current Outpatient Medications   Medication    acetaminophen (TYLENOL) 500 MG tablet    aspirin 81 MG Chew    atorvastatin (LIPITOR) 20 MG tablet    azilsartan med-chlorthalidone (EDARBYCLOR) 40-25 mg Tab    budesonide-formoterol 80-4.5 mcg (SYMBICORT) 80-4.5 mcg/actuation HFAA    cetirizine (ZYRTEC) 10 MG tablet    COVID-19 vacc,mRNA,Moderna,-PF (SPIKEVAX, PF,) 100 mcg/0.5 mL Susp    docusate sodium (COLACE) 100 MG capsule    estradiol-norethindrone (COMBIPATCH) 0.05-0.14 mg/24 hr    HYDROcodone-ibuprofen (VICOPROFEN) 5-200 mg per tablet    hydrocortisone (ANUSOL-HC) 25 mg suppository    hydrocortisone 2.5 % cream    lactulose (CHRONULAC) 10 gram/15 mL solution    LIDOcaine (LIDODERM) 5 %    LINZESS 72 mcg Cap capsule    methylphenidate HCl (RITALIN) 20 MG tablet    multivitamin (THERAGRAN) per tablet    omega-3 fatty acids/fish oil (FISH OIL-OMEGA-3 FATTY ACIDS) 300-1,000 mg capsule    pantoprazole (PROTONIX) 40 MG tablet    sucralfate (CARAFATE) 100 mg/mL suspension    traMADoL (ULTRAM) 50 mg tablet    vitamin D3-folic acid 5,000 unit- 1 mg Tab    vitamin E 100 UNIT capsule    zinc gluconate 50 mg tablet     Current Facility-Administered Medications   Medication Frequency    onabotulinumtoxina injection 100 Units Once     Facility-Administered Medications Ordered in Other Visits   Medication Frequency    0.9%  NaCl infusion Continuous    mupirocin 2 % ointment On Call Procedure       ONCOLOGY HISTORY  Oncology History    No history exists.       Objective:  Felicia Ruffin arrived late for the session. The patient was fully cooperative throughout the session.  Appearance: age appropriate, appropriately  dressed, adequately   groomed  Behavior/Cooperation: friendly and cooperative  Speech: normal in rate, volume, and tone  Mood: euthymic  Affect: mood congruent and appropriate  Thought Process: goal-directed, logical  Thought Content: normal,  No delusions or paranoia; did not appear to be responding to internal stimuli during the session  Orientation: grossly intact  Memory: grossly intact  Attention Span/Concentration: Attends to session without distraction; reports no difficulty  Fund of Knowledge: average  Estimate of Intelligence: average from verbal skills and history  Cognition: grossly intact  Insight: patient has awareness of illness; good insight into own behavior and behavior of others  Judgment: the patient's behavior is adequate to circumstances    NCCN Distress thermometer:       1/4/2023     8:40 AM   DISTRESS SCREENING   Distress Score 7        Interval history and content of current session: Ms. Ruffin provided an update on her treatment status. She has begun chemotherapy and discontinued radiation at this time. She noted some nausea related to her chemo treatment which is impacting her ability to complete tasks (cooking, driving, cleaning) and be independent. She is still on a feeding tube. Ms. Ruffin noted concerns about her son's college summer program. She also mentioned concerns about transportation when her son is away at college. His graduation is next week and she will be preparing some dishes for his graduation party. She was reminded of the importance of pacing herself even when celebratory events occur.      Risk parameters:   Patient reports no suicidal ideation  Patient reports no homicidal ideation  Patient reports no self-injurious behavior  Patient reports no violent behavior   Safety needs:  None at this time      Verbal deficits: None     Patient's response to intervention:The patient's response to intervention is accepting.     Progress toward goals and other mental status changes:  The patient's  progress toward goals is fair .      Progress to date:Progress - Ongoing, but Slow      Goals from last visit: Met        Patient Strengths: verbal, intelligent, successful, good social support, good insight, commitment to wellness, strong iron      Treatment Plan:individual psychotherapy  Target symptoms:  energy preservation  Why chosen therapy is appropriate versus another modality: patient responds to this modality  Outcome monitoring methods: self-report  Therapeutic intervention type: supportive psychotherapy  Prognosis: Good      Behavioral goals:    Pace herself with celebratory activities and daily activities    Return to clinic: 1 month     Length of Service (minutes direct face-to-face contact): 40 minutes    Diagnosis:     ICD-10-CM ICD-9-CM   1. Adjustment disorder with mixed anxiety and depressed mood  F43.23 309.28                     Magda Ace, PhD  Clinical Health Psychology Fellow

## 2024-06-06 ENCOUNTER — OFFICE VISIT (OUTPATIENT)
Dept: PSYCHIATRY | Facility: CLINIC | Age: 59
End: 2024-06-06
Payer: OTHER GOVERNMENT

## 2024-06-06 DIAGNOSIS — F43.23 ADJUSTMENT DISORDER WITH MIXED ANXIETY AND DEPRESSED MOOD: Primary | ICD-10-CM

## 2024-06-06 PROCEDURE — 90837 PSYTX W PT 60 MINUTES: CPT | Mod: ,,,

## 2024-06-06 NOTE — PROGRESS NOTES
PSYCHO-ONCOLOGY NOTE/ Individual Psychotherapy     Date: 6/6/2024   Site:  [facility]      Therapeutic Intervention: Met with patient.  Outpatient - Supportive psychotherapy 60 min - CPT Code 03927    This includes face to face time and non-face to face time preparing to see the patient, obtaining and/or reviewing separately obtained history, documenting clinical information in the electronic or other health record, independently interpreting results and communicating results to the patient/family/caregiver, or care coordinator.      Patient was last seen by me on 5/9/2024    Problem list  Patient Active Problem List   Diagnosis    HTN (hypertension)    Asthma    Pre-diabetes    Kidney stone    Chronic lumbar pain    Hyperlipemia    Anal fissure    Iron deficiency anemia, unspecified    OB + stool    Dysphagia    Gastric adenocarcinoma    Chemotherapy-induced peripheral neuropathy    Chemotherapy-induced fatigue    Insomnia    Achalasia       Chief complaint/reason for encounter: anxiety and frustration   Met with patient to evaluate psychosocial adaptation to diagnosis/treatment/survivorship of gastric adenocarcinoma     Current Medications  Current Outpatient Medications   Medication    acetaminophen (TYLENOL) 500 MG tablet    aspirin 81 MG Chew    atorvastatin (LIPITOR) 20 MG tablet    azilsartan med-chlorthalidone (EDARBYCLOR) 40-25 mg Tab    budesonide-formoterol 80-4.5 mcg (SYMBICORT) 80-4.5 mcg/actuation HFAA    cetirizine (ZYRTEC) 10 MG tablet    COVID-19 vacc,mRNA,Moderna,-PF (SPIKEVAX, PF,) 100 mcg/0.5 mL Susp    docusate sodium (COLACE) 100 MG capsule    estradiol-norethindrone (COMBIPATCH) 0.05-0.14 mg/24 hr    HYDROcodone-ibuprofen (VICOPROFEN) 5-200 mg per tablet    hydrocortisone (ANUSOL-HC) 25 mg suppository    hydrocortisone 2.5 % cream    lactulose (CHRONULAC) 10 gram/15 mL solution    LIDOcaine (LIDODERM) 5 %    LINZESS 72 mcg Cap capsule    methylphenidate HCl (RITALIN) 20 MG tablet     multivitamin (THERAGRAN) per tablet    omega-3 fatty acids/fish oil (FISH OIL-OMEGA-3 FATTY ACIDS) 300-1,000 mg capsule    pantoprazole (PROTONIX) 40 MG tablet    sucralfate (CARAFATE) 100 mg/mL suspension    traMADoL (ULTRAM) 50 mg tablet    vitamin D3-folic acid 5,000 unit- 1 mg Tab    vitamin E 100 UNIT capsule    zinc gluconate 50 mg tablet     Current Facility-Administered Medications   Medication Frequency    onabotulinumtoxina injection 100 Units Once     Facility-Administered Medications Ordered in Other Visits   Medication Frequency    0.9%  NaCl infusion Continuous    mupirocin 2 % ointment On Call Procedure       ONCOLOGY HISTORY  Oncology History    No history exists.       Objective:  Felicia Ruffin arrived promptly for the session.  Ms. Ruffin was independently ambulatory at the time of session. The patient was fully cooperative throughout the session.  Appearance: age appropriate, appropriately  dressed, adequately  groomed  Behavior/Cooperation: friendly and cooperative  Speech: normal in rate, volume, and tone  Mood: euthymic  Affect: mood congruent and appropriate  Thought Process: goal-directed, logical  Thought Content: normal,  No delusions or paranoia; did not appear to be responding to internal stimuli during the session  Orientation: grossly intact  Memory: grossly intact  Attention Span/Concentration: Attends to session without distraction; reports no difficulty  Fund of Knowledge: average  Estimate of Intelligence: average from verbal skills and history  Cognition: grossly intact  Insight: patient has awareness of illness; good insight into own behavior and behavior of others  Judgment: the patient's behavior is adequate to circumstances    NCCN Distress thermometer:       1/4/2023     8:40 AM   DISTRESS SCREENING   Distress Score 7        Interval history and content of current session: Ms. Ruffin reported her son's graduation party went well and she did not overexert herself on cooking  food or socializing. She is concerned about her son's mental health regarding her health and his impending transition to college. Her son's feelings were normalized and she was encouraged to consider whether intervening in his therapy is appropriate. She recently switched to a new chemotherapy which she believes is working (due to her lab work, less pain). She has more energy on this chemo but she has lost her hair. Ms. Ruffin is in pain with her feeding tube and she is attending various doctor's appointments related to this. Ms. Ruffin noted continued frustrations with the lack of communication between her providers at three institutions ([facility], [facility], [facility]). She reported [facility] does not use MyChart/EPIC and this impedes communication between her providers. Ms. Ruffin was encouraged to contact [facility] to discuss ways to share her medical records with ease. Ms. Ruffin was also encouraged to contact the  at [facility] to discuss her concerns/questions related to short-term and long-term disability.      Risk parameters:   Patient reports no suicidal ideation  Patient reports no homicidal ideation  Patient reports no self-injurious behavior  Patient reports no violent behavior   Safety needs:  None at this time      Verbal deficits: None     Patient's response to intervention:The patient's response to intervention is accepting.     Progress toward goals and other mental status changes:  The patient's progress toward goals is fair .      Progress to date:Progress - Ongoing, but Slow      Goals from last visit: Met        Patient Strengths: verbal, intelligent, successful, good social support, good insight, commitment to wellness      Treatment Plan:individual psychotherapy  Target symptoms: anxiety , frustrations  Why chosen therapy is appropriate versus another modality: evidence based practice  Outcome monitoring methods: self-report  Therapeutic intervention type: supportive  psychotherapy  Prognosis: Fair      Behavioral goals:    Therapy: discuss her concerns about disability and sharing medical records between her providers with the  and records department at [facility]     Return to clinic: 2 months with Dr. Annette Wallace since this provider will be leaving the Cancer Center     Length of Service (minutes direct face-to-face contact): 60 minutes    Diagnosis:     ICD-10-CM ICD-9-CM   1. Adjustment disorder with mixed anxiety and depressed mood  F43.23 309.28                     Magda Ace, PhD  Clinical Health Psychology Fellow

## 2024-10-17 ENCOUNTER — DOCUMENTATION ONLY (OUTPATIENT)
Dept: HEMATOLOGY/ONCOLOGY | Facility: CLINIC | Age: 59
End: 2024-10-17
Payer: OTHER GOVERNMENT

## 2024-10-17 NOTE — PROGRESS NOTES
Received from [facility] Referral/auth for appt w/ Dr Pedroza.  Pt self scheduled with Dr Pedroza for Oct 31st with dx of Stage 4 gastric cancer.  GI navigator out of office today.     After discussing with other navigators, scanned in auth into media and requested from [facility] imaging CT and PET w/ dates; request scanned into Media.     GI navigator will be informed at her return.

## 2024-10-31 ENCOUNTER — OFFICE VISIT (OUTPATIENT)
Dept: HEMATOLOGY/ONCOLOGY | Facility: CLINIC | Age: 59
End: 2024-10-31
Payer: OTHER GOVERNMENT

## 2024-10-31 ENCOUNTER — DOCUMENTATION ONLY (OUTPATIENT)
Dept: HEMATOLOGY/ONCOLOGY | Facility: CLINIC | Age: 59
End: 2024-10-31
Payer: OTHER GOVERNMENT

## 2024-10-31 VITALS
HEIGHT: 65 IN | TEMPERATURE: 97 F | OXYGEN SATURATION: 95 % | SYSTOLIC BLOOD PRESSURE: 101 MMHG | RESPIRATION RATE: 16 BRPM | WEIGHT: 98.13 LBS | HEART RATE: 70 BPM | BODY MASS INDEX: 16.35 KG/M2 | DIASTOLIC BLOOD PRESSURE: 63 MMHG

## 2024-10-31 DIAGNOSIS — T45.1X5A CHEMOTHERAPY-INDUCED PERIPHERAL NEUROPATHY: ICD-10-CM

## 2024-10-31 DIAGNOSIS — C78.01 MALIGNANT NEOPLASM METASTATIC TO BOTH LUNGS: ICD-10-CM

## 2024-10-31 DIAGNOSIS — C16.9 GASTRIC ADENOCARCINOMA: Primary | ICD-10-CM

## 2024-10-31 DIAGNOSIS — C78.02 MALIGNANT NEOPLASM METASTATIC TO BOTH LUNGS: ICD-10-CM

## 2024-10-31 DIAGNOSIS — C78.6 SECONDARY MALIGNANT NEOPLASM OF PARIETAL PERITONEUM: ICD-10-CM

## 2024-10-31 DIAGNOSIS — C78.7 SECONDARY LIVER CANCER: ICD-10-CM

## 2024-10-31 DIAGNOSIS — G62.0 CHEMOTHERAPY-INDUCED PERIPHERAL NEUROPATHY: ICD-10-CM

## 2024-10-31 PROCEDURE — 99215 OFFICE O/P EST HI 40 MIN: CPT | Mod: PBBFAC,PN | Performed by: INTERNAL MEDICINE

## 2024-10-31 PROCEDURE — 99999 PR PBB SHADOW E&M-EST. PATIENT-LVL V: CPT | Mod: PBBFAC,,, | Performed by: INTERNAL MEDICINE

## 2024-11-01 ENCOUNTER — PATIENT MESSAGE (OUTPATIENT)
Dept: HEMATOLOGY/ONCOLOGY | Facility: CLINIC | Age: 59
End: 2024-11-01
Payer: OTHER GOVERNMENT

## 2024-11-01 ENCOUNTER — INFUSION (OUTPATIENT)
Dept: INFUSION THERAPY | Facility: HOSPITAL | Age: 59
End: 2024-11-01
Attending: NURSE PRACTITIONER
Payer: OTHER GOVERNMENT

## 2024-11-01 DIAGNOSIS — C16.9 GASTRIC ADENOCARCINOMA: Primary | ICD-10-CM

## 2024-11-01 PROBLEM — C78.7 SECONDARY LIVER CANCER: Status: ACTIVE | Noted: 2024-11-01

## 2024-11-01 PROBLEM — C78.6: Status: ACTIVE | Noted: 2024-11-01

## 2024-11-01 PROBLEM — C78.00 SECONDARY LUNG CANCER: Status: ACTIVE | Noted: 2024-11-01

## 2024-11-01 LAB
ALBUMIN SERPL BCP-MCNC: 3.3 G/DL (ref 3.5–5.2)
ALP SERPL-CCNC: 592 U/L (ref 40–150)
ALT SERPL W/O P-5'-P-CCNC: 54 U/L (ref 10–44)
ANION GAP SERPL CALC-SCNC: 12 MMOL/L (ref 8–16)
AST SERPL-CCNC: 51 U/L (ref 10–40)
BILIRUB SERPL-MCNC: 0.9 MG/DL (ref 0.1–1)
BUN SERPL-MCNC: 11 MG/DL (ref 6–20)
CALCIUM SERPL-MCNC: 9.3 MG/DL (ref 8.7–10.5)
CHLORIDE SERPL-SCNC: 104 MMOL/L (ref 95–110)
CO2 SERPL-SCNC: 25 MMOL/L (ref 23–29)
CREAT SERPL-MCNC: 0.5 MG/DL (ref 0.5–1.4)
ERYTHROCYTE [DISTWIDTH] IN BLOOD BY AUTOMATED COUNT: 17.5 % (ref 11.5–14.5)
EST. GFR  (NO RACE VARIABLE): >60 ML/MIN/1.73 M^2
GLUCOSE SERPL-MCNC: 114 MG/DL (ref 70–110)
HCT VFR BLD AUTO: 26.7 % (ref 37–48.5)
HGB BLD-MCNC: 8.5 G/DL (ref 12–16)
IMM GRANULOCYTES # BLD AUTO: 0.01 K/UL (ref 0–0.04)
MCH RBC QN AUTO: 29.4 PG (ref 27–31)
MCHC RBC AUTO-ENTMCNC: 31.8 G/DL (ref 32–36)
MCV RBC AUTO: 92 FL (ref 82–98)
NEUTROPHILS # BLD AUTO: 3.1 K/UL (ref 1.8–7.7)
PLATELET # BLD AUTO: 139 K/UL (ref 150–450)
PMV BLD AUTO: 8.9 FL (ref 9.2–12.9)
POTASSIUM SERPL-SCNC: 3.8 MMOL/L (ref 3.5–5.1)
PROT SERPL-MCNC: 6.5 G/DL (ref 6–8.4)
RBC # BLD AUTO: 2.89 M/UL (ref 4–5.4)
SODIUM SERPL-SCNC: 141 MMOL/L (ref 136–145)
WBC # BLD AUTO: 3.57 K/UL (ref 3.9–12.7)

## 2024-11-01 PROCEDURE — 63600175 PHARM REV CODE 636 W HCPCS: Mod: PN | Performed by: INTERNAL MEDICINE

## 2024-11-01 PROCEDURE — A4216 STERILE WATER/SALINE, 10 ML: HCPCS | Mod: PN | Performed by: INTERNAL MEDICINE

## 2024-11-01 PROCEDURE — 85027 COMPLETE CBC AUTOMATED: CPT | Mod: PN | Performed by: INTERNAL MEDICINE

## 2024-11-01 PROCEDURE — 80053 COMPREHEN METABOLIC PANEL: CPT | Mod: PN | Performed by: INTERNAL MEDICINE

## 2024-11-01 PROCEDURE — 36591 DRAW BLOOD OFF VENOUS DEVICE: CPT | Mod: PN

## 2024-11-01 PROCEDURE — 25000003 PHARM REV CODE 250: Mod: PN | Performed by: INTERNAL MEDICINE

## 2024-11-01 RX ORDER — SODIUM CHLORIDE 0.9 % (FLUSH) 0.9 %
10 SYRINGE (ML) INJECTION
Status: DISCONTINUED | OUTPATIENT
Start: 2024-11-01 | End: 2024-11-01 | Stop reason: HOSPADM

## 2024-11-01 RX ORDER — HEPARIN 100 UNIT/ML
500 SYRINGE INTRAVENOUS
OUTPATIENT
Start: 2024-11-01

## 2024-11-01 RX ORDER — HEPARIN 100 UNIT/ML
500 SYRINGE INTRAVENOUS
Status: DISCONTINUED | OUTPATIENT
Start: 2024-11-01 | End: 2024-11-01 | Stop reason: HOSPADM

## 2024-11-01 RX ORDER — SODIUM CHLORIDE 0.9 % (FLUSH) 0.9 %
10 SYRINGE (ML) INJECTION
OUTPATIENT
Start: 2024-11-01

## 2024-11-01 RX ADMIN — SODIUM CHLORIDE, PRESERVATIVE FREE 10 ML: 5 INJECTION INTRAVENOUS at 10:11

## 2024-11-01 RX ADMIN — Medication 500 UNITS: at 10:11

## 2024-11-04 ENCOUNTER — TUMOR BOARD CONFERENCE (OUTPATIENT)
Dept: SURGERY | Facility: CLINIC | Age: 59
End: 2024-11-04
Payer: OTHER GOVERNMENT

## 2024-11-04 ENCOUNTER — TELEPHONE (OUTPATIENT)
Dept: HEMATOLOGY/ONCOLOGY | Facility: CLINIC | Age: 59
End: 2024-11-04
Payer: OTHER GOVERNMENT

## 2024-11-04 NOTE — PROGRESS NOTES
[facility] UGI MULTIDISCIPLINARY TUMOR BOARD  PATIENT REVIEW FORM   ____________________________________________________________    CLINIC #:  [id number]        DATE: 11/4/2024    DIAGNOSIS: gastric CA    Her case was not presented to this [facility].

## 2024-11-05 ENCOUNTER — DOCUMENTATION ONLY (OUTPATIENT)
Dept: HEMATOLOGY/ONCOLOGY | Facility: CLINIC | Age: 59
End: 2024-11-05
Payer: OTHER GOVERNMENT

## 2024-11-05 ENCOUNTER — OFFICE VISIT (OUTPATIENT)
Dept: HEMATOLOGY/ONCOLOGY | Facility: CLINIC | Age: 59
End: 2024-11-05
Payer: OTHER GOVERNMENT

## 2024-11-05 VITALS
HEIGHT: 62 IN | DIASTOLIC BLOOD PRESSURE: 70 MMHG | BODY MASS INDEX: 17.94 KG/M2 | HEART RATE: 82 BPM | WEIGHT: 97.5 LBS | OXYGEN SATURATION: 99 % | SYSTOLIC BLOOD PRESSURE: 110 MMHG | TEMPERATURE: 98 F

## 2024-11-05 DIAGNOSIS — C16.9 GASTRIC ADENOCARCINOMA: Primary | ICD-10-CM

## 2024-11-05 DIAGNOSIS — R53.0 NEOPLASTIC MALIGNANT RELATED FATIGUE: ICD-10-CM

## 2024-11-05 DIAGNOSIS — T45.1X5A CHEMOTHERAPY-INDUCED PERIPHERAL NEUROPATHY: ICD-10-CM

## 2024-11-05 DIAGNOSIS — R45.89 ANXIETY ABOUT HEALTH: ICD-10-CM

## 2024-11-05 DIAGNOSIS — M54.59 OTHER LOW BACK PAIN: Primary | ICD-10-CM

## 2024-11-05 DIAGNOSIS — C16.9 GASTRIC ADENOCARCINOMA: ICD-10-CM

## 2024-11-05 DIAGNOSIS — G62.0 CHEMOTHERAPY-INDUCED PERIPHERAL NEUROPATHY: ICD-10-CM

## 2024-11-05 PROCEDURE — 99215 OFFICE O/P EST HI 40 MIN: CPT | Mod: S$PBB,,, | Performed by: NURSE PRACTITIONER

## 2024-11-05 PROCEDURE — 99215 OFFICE O/P EST HI 40 MIN: CPT | Mod: PBBFAC,PN | Performed by: NURSE PRACTITIONER

## 2024-11-05 PROCEDURE — 99999 PR PBB SHADOW E&M-EST. PATIENT-LVL V: CPT | Mod: PBBFAC,,, | Performed by: NURSE PRACTITIONER

## 2024-11-05 RX ORDER — OXYCODONE 18 MG/1
1 CAPSULE, EXTENDED RELEASE ORAL 2 TIMES DAILY
COMMUNITY

## 2024-11-05 RX ORDER — ONDANSETRON 4 MG/1
TABLET, ORALLY DISINTEGRATING ORAL
COMMUNITY
Start: 2024-03-14

## 2024-11-05 RX ORDER — BENZONATATE 200 MG/1
CAPSULE ORAL
COMMUNITY
Start: 2024-02-21

## 2024-11-05 RX ORDER — ACETAMINOPHEN 500 MG
TABLET ORAL
COMMUNITY
Start: 2023-09-01

## 2024-11-05 RX ORDER — METOCLOPRAMIDE 10 MG/1
10 TABLET ORAL
COMMUNITY

## 2024-11-05 RX ORDER — TRIFLURIDINE AND TIPIRACIL 20; 8.19 MG/1; MG/1
TABLET, FILM COATED ORAL
COMMUNITY

## 2024-11-05 RX ORDER — LIDOCAINE AND PRILOCAINE 25; 25 MG/G; MG/G
CREAM TOPICAL
COMMUNITY
Start: 2023-12-13

## 2024-11-05 RX ORDER — OXYCODONE HYDROCHLORIDE 5 MG/1
5 TABLET ORAL
COMMUNITY
Start: 2024-10-29

## 2024-11-05 RX ORDER — GABAPENTIN 250 MG/5ML
300 SOLUTION ORAL
COMMUNITY
Start: 2024-04-24

## 2024-11-05 RX ORDER — TRIFLURIDINE AND TIPIRACIL 15; 6.14 MG/1; MG/1
TABLET, FILM COATED ORAL
COMMUNITY

## 2024-11-05 RX ORDER — LORAZEPAM 2 MG/ML
CONCENTRATE ORAL
COMMUNITY
Start: 2024-04-25

## 2024-11-05 RX ORDER — POTASSIUM BICARBONATE 782 MG/1
20 TABLET, EFFERVESCENT ORAL
COMMUNITY
Start: 2024-09-12

## 2024-11-05 NOTE — NURSING
Reviewed chart, assigned self as navigator.  Patient saw Dr. Pedroza 10/31, patient of Dr. Rosa that would like to be referred to [facility] for Trial, gastric cancer.

## 2024-11-05 NOTE — PROGRESS NOTES
"Felicia Ruffin  58 y.o. is here to seek an integrative approach to discuss side effects related to gastric cancer treatment. Felicia Ruffin  was referred by Dr. Pedroza     HPI  Mrs. Ruffin reports she had started losing weight and was thinking it was from Ozempic. She then started having pain between her breastbone and it felt like food was stuck. She has been to [facility] times. She has completed 13 chemo treatments. She took oxaliplatin, but stopped after 10 cycles due to neuropathy. She states the neuropathy has not improved since stopping the Oxaliplatin. She states it is numbness and tingling to the bottom of her feet and her toes and her fingertips. She reports she was walking 2 miles most days of the week, but stopped due to the neuropathy. She sleep approximately 5-6 hours at night and then takes a nap daily. She reports she is fatigued, but states it is better since she has been off of chemo. She reports a lot of stress and anxiety through her life. She states she stresses about finances. She reports she does not have a hunger feeling, but eats because she knows she has to eat. She states she cannot digest vegetables so it makes eating healthy hard.   She has been  for 19 years and she has a 17 year old who will be a senior at [employer]. She works full time and is currently working from home.     Today's Visit  Mrs. Duarte reports she went to [facility] and she was encouraged to get some other physician opinions. She went to [facility] and our cancer center. She is currently on Lonsurf. She is considering changing her care to this cancer center.   She has a feeding tube that is causing her pain. She is eating more so she is using the tube for medication. The tube is causing pain, appears to be hypergranulation tissue. She is waiting for approval to go to wound care. She continues to work from home, but has fatigue. She states, "I do one little think and I am tired." She continues to have neuropathy since chemo. She " "also has back pain and uses icy hot as needed. She states, "this is such a mental game. I feel like mentally I am a ping pong ball." She had improved scans and EGD and then her CEA started increasing and now it is "all over again" She has a roller coaster of emotions. Her activity level is low due to fatigue.      Pillars Assessment    Sleep  How many hours of sleep per night? 5-6 hours  Do you have trouble falling asleep, staying asleep or waking up earlier than you need to? yes  Do you have daytime fatigue? yes  Do you need medication for sleep? yes She takes oxycodone for pain which helps her sleep.   Do you use any supplements or other interventions for sleep? no    Resilience  Rate your current level of stress- high  How do you manage stress?  Prayer, psychology     Spirituality-  [facility], [facility]    Nutrition   Food allergies or sensitivities: none  Do you adhere to a particular type of diet? no  Do you have any concerns with your eating habits? no    Exercise  How would you describe your physical activity level? low    Past Medical History  Past Medical History:   Diagnosis Date    Allergy     Asthma     Hyperlipidemia     Hypertension     Lumbago     PONV (postoperative nausea and vomiting)     Pre-diabetes     Skin sensitivity     Vitamin D deficiency       Past Surgical History   Past Surgical History:   Procedure Laterality Date    ANOSCOPY N/A 2023    Procedure: ANOSCOPY;  Surgeon: MARICRUZ Contreras MD;  Location: [address]);  Service: Endoscopy;  Laterality: N/A;  ok per DR. Titus w/ anesthesia to extend block by 2o min - and the ok to add on case per Diane     SECTION      times 1    COLONOSCOPY N/A 10/02/2020    Procedure: COLONOSCOPY;  Surgeon: Ortega Thao MD;  Location: [facility];  Service: Endoscopy;  Laterality: N/A;    DIAGNOSTIC LAPAROSCOPY N/A 2022    Procedure: LAPAROSCOPY, DIAGNOSTIC;  Surgeon: Tee Porter MD;  Location: [facility]" 2ND FLR;  Service: General;  Laterality: N/A;  Diag lap and port insertion    DILATION AND CURETTAGE OF UTERUS      ESOPHAGOGASTRODUODENOSCOPY N/A 10/18/2022    Procedure: EGD (ESOPHAGOGASTRODUODENOSCOPY);  Surgeon: CLINT Montanez MD;  Location: [facility] ENDO;  Service: Endoscopy;  Laterality: N/A;    ESOPHAGOGASTRODUODENOSCOPY N/A 3/4/2024    Procedure: EGD (ESOPHAGOGASTRODUODENOSCOPY);  Surgeon: Sixto Bauamnn MD;  Location: [facility] ENDO;  Service: Endoscopy;  Laterality: N/A;    INSERTION OF TUNNELED CENTRAL VENOUS CATHETER (CVC) WITH SUBCUTANEOUS PORT Right 11/08/2022    Procedure: INSERTION, SINGLE LUMEN CATHETER WITH PORT, WITH FLUOROSCOPIC GUIDANCE;  Surgeon: Tee Porter MD;  Location: [facility] OR 2ND FLR;  Service: General;  Laterality: Right;  Diag lap and port insertion    LAPAROSCOPY  06/23/2023    KY LAPS ABD PRTM&OMENTUM DX W/WO SPEC BR/WA SPX    LATERAL INTERNAL ANAL SPHINCTEROTOMY N/A 06/29/2020    Procedure: SPHINCTEROTOMY, ANAL, INTERNAL, LATERAL;  Surgeon: Brigido Contreras MD;  Location: [facility] OR 2ND FLR;  Service: Colon and Rectal;  Laterality: N/A;    tonsillectomy      TONSILLECTOMY        Family History   Family History   Problem Relation Name Age of Onset    Hypertension Mother      Stroke Father      Hypertension Father      Heart disease Father      Stroke Paternal Grandfather      Colon polyps Sister Maria Del Carmen     Prostate cancer Brother Gregory 57        s/p surg only    Breast cancer Maternal Aunt  90        unilat?    Obesity Maternal Aunt Nati     Prostate cancer Maternal Uncle [address] 44    Cancer Other Oskar         origin?    Prostate cancer Other Lemuel 52    Cancer Other          maybe    Heart disease Paternal Aunt      Heart disease Paternal Uncle Harvey     Heart disease Paternal Uncle Toño     Heart disease Paternal Uncle Elvis       Social History  Social History     Socioeconomic History    Marital status:    Tobacco Use    Smoking status: Never    Smokeless tobacco:  Never   Substance and Sexual Activity    Alcohol use: No    Drug use: No    Sexual activity: Yes     Birth control/protection: None     Social Drivers of Health     Food Insecurity: No Food Insecurity (3/9/2024)    Received from [facility] [employer], [facility]    Hunger Vital Sign     Worried About Running Out of Food in the Last Year: Never true     Ran Out of Food in the Last Year: Never true   Transportation Needs: No Transportation Needs (3/9/2024)    Received from [facility]    PRAPARE - Transportation     Lack of Transportation (Medical): No     Lack of Transportation (Non-Medical): No   Housing Stability: Low Risk  (3/9/2024)    Received from [facility] [employer], [facility]    Housing Stability Vital Sign     Unable to Pay for Housing in the Last Year: No     Number of Places Lived in the Last Year: 1     Unstable Housing in the Last Year: No      Allergies  Review of patient's allergies indicates:   Allergen Reactions    Other Itching     Tape-sensitive skin    Adhesive Hives and Rash     Okay to use paper tape//patient    Codeine Itching    Percodan [oxycodone hcl-oxycodone-asa] Itching    Vicodin [hydrocodone-acetaminophen] Itching    Cocoa Hives and Other (See Comments)     Agent:  Patient suspects she is allergic to the cocoa bean    Reactions:  Hives, fever    Dilaudid [hydromorphone]     Sunscreen Hives and Other (See Comments)     Reactions:  Hives, fever      Current Medications:    Current Outpatient Medications:     acetaminophen (TYLENOL) 500 MG tablet, Take 1,000 mg by mouth every 6 to 8 hours as needed (pain). , Disp: , Rfl:     atorvastatin (LIPITOR) 20 MG tablet, TAKE 1 TABLET AT BEDTIME (Patient taking differently: Take 20 mg by mouth every evening.), Disp: 90 tablet, Rfl: 2    azilsartan med-chlorthalidone (EDARBYCLOR) 40-25 mg Tab, Take 1 tablet by mouth nightly., Disp: , Rfl:     benzonatate (TESSALON) 200 MG capsule, , Disp: , Rfl:     cetirizine (ZYRTEC) 10 MG tablet, TAKE 1 TABLET DAILY  (Patient taking differently: Take 10 mg by mouth every evening.), Disp: 90 tablet, Rfl: 1    cholecalciferol, vitamin D3, (VITAMIN D3) 50 mcg (2,000 unit) Cap capsule, , Disp: , Rfl:     COVID-19 vacc,mRNA,Moderna,-PF (SPIKEVAX, PF,) 100 mcg/0.5 mL Susp, , Disp: , Rfl:     docusate sodium (COLACE) 100 MG capsule, Take 200 mg by mouth 2 (two) times daily., Disp: , Rfl:     EFFER-K disintegrating tablet, Take 20 mEq by mouth., Disp: , Rfl:     gabapentin (NEURONTIN) 250 mg/5 mL solution, Take 300 mg by mouth., Disp: , Rfl:     hydrocortisone 2.5 % cream, , Disp: , Rfl:     lactulose (CHRONULAC) 10 gram/15 mL solution, Take by mouth 3 (three) times daily., Disp: , Rfl:     LIDOcaine (LIDODERM) 5 %, , Disp: , Rfl:     LIDOcaine-prilocaine (EMLA) cream, , Disp: , Rfl:     LINZESS 72 mcg Cap capsule, TAKE 1 CAPSULE BEFORE BREAKFAST, Disp: 30 capsule, Rfl: 11    LONSURF 15-6.14 mg Tab, Take by mouth., Disp: , Rfl:     LONSURF 20-8.19 mg Tab, Take by mouth., Disp: , Rfl:     LORazepam (LORAZEPAM INTENSOL) 2 mg/mL Conc, GIVE ONE ML EVERY 12 HOURS AS NEEDED FOR ANXIETY, Disp: , Rfl:     methylphenidate HCl (RITALIN) 20 MG tablet, Take 20 mg by mouth 2 (two) times daily., Disp: , Rfl:     metoclopramide HCl (REGLAN) 10 MG tablet, Take 10 mg by mouth., Disp: , Rfl:     ondansetron (ZOFRAN-ODT) 4 MG TbDL, , Disp: , Rfl:     oxyCODONE (ROXICODONE) 5 MG immediate release tablet, Take 5 mg by mouth every 4 to 6 hours as needed., Disp: , Rfl:     pantoprazole (PROTONIX) 40 MG tablet, TAKE ONE TABLET BY MOUTH TWICE DAILY BEFORE breakfast and dinner, Disp: , Rfl:     sucralfate (CARAFATE) 100 mg/mL suspension, Take 1 g by mouth 4 (four) times daily., Disp: , Rfl:     XTAMPZA ER 18 mg CSpT, Take 1 capsule by mouth 2 (two) times daily., Disp: , Rfl:     budesonide-formoterol 80-4.5 mcg (SYMBICORT) 80-4.5 mcg/actuation HFAA, , Disp: , Rfl:     hydrocortisone (ANUSOL-HC) 25 mg suppository, , Disp: , Rfl:     zinc gluconate 50 mg tablet,  "Take 50 mg by mouth once daily., Disp: , Rfl:     Current Facility-Administered Medications:     onabotulinumtoxina injection 100 Units, 100 Units, Subcutaneous, Once, Sixto Baumann MD    Facility-Administered Medications Ordered in Other Visits:     0.9%  NaCl infusion, , Intravenous, Continuous, Tonia Woods NP, Last Rate: 70 mL/hr at 06/29/20 0603, New Bag at 06/29/20 0603    mupirocin 2 % ointment, , Nasal, On Call Procedure, Tonia Woods NP, Given at 06/29/20 0603     Review of Systems  Review of Systems   Constitutional:  Positive for malaise/fatigue.   HENT: Negative.     Eyes: Negative.    Respiratory: Negative.     Cardiovascular: Negative.    Gastrointestinal: Negative.    Genitourinary: Negative.    Musculoskeletal:  Positive for back pain.   Skin: Negative.    Neurological:  Positive for tingling.   Endo/Heme/Allergies: Negative.    Psychiatric/Behavioral:  The patient is nervous/anxious and has insomnia.       Physical Exam      Vitals:    11/05/24 1025   BP: 110/70   BP Location: Left arm   Patient Position: Sitting   Pulse: 82   Temp: 98 °F (36.7 °C)   TempSrc: Temporal   SpO2: 99%   Weight: 44.2 kg (97 lb 8 oz)   Height: 5' 2" (1.575 m)     Body mass index is 17.83 kg/m².  Physical Exam  Vitals reviewed.   Constitutional:       Appearance: Normal appearance.   Neurological:      Mental Status: She is alert.   Psychiatric:         Mood and Affect: Mood normal.         Behavior: Behavior normal.        ASSESSMENT :  1. Other low back pain    2. Chemotherapy-induced peripheral neuropathy    3. Neoplastic malignant related fatigue    4. Anxiety about health    5. Gastric adenocarcinoma      PLAN:  Reviewed all information discussed at today's visit and all questions were answered.    Counseled on healthy lifestyle and behavior modifications:   Referral to Acupuncture  I explained acupuncture can reduce fatigue,  pain, and neuropathy. It can also assist with behavioral health such " as depression and anxiety and improve overall sleep quality. Acupuncture helps improve overall symptoms from treatment.   Referral to Psychology  I discussed and recommended the following support services:  Meek Chi and Yoga I suggested Meek Chi and/or Yoga as these practices reduce stress, increases flexibility and muscle strength, improves balance and promotes serenity in the power of movement to help fight disease and boost your immune system.   Music and relaxation therapy and Meditation which can decrease stress by lowering blood pressure, slowing breathing, and helping you be more present in the moment. It improves sleep by relaxing the body and mind at the end of the day.Meditation also trains you how to focus on one thing at a time, improving concentration. It also promotes emotional well-being by decreasing depression and anxiety, and helping create a more positive outlook on life.  Art Therapy    Follow up with Integrative Services in 3 months     I spent a total of 40 minutes on the day of the visit.This includes face to face time and non-face to face time preparing to see the patient (eg, review of tests), obtaining and/or reviewing separately obtained history, documenting clinical information in the electronic or other health record, independently interpreting results and communicating results to the patient/family/caregiver, or care coordinator.

## 2024-11-06 ENCOUNTER — TELEPHONE (OUTPATIENT)
Dept: HEMATOLOGY/ONCOLOGY | Facility: CLINIC | Age: 59
End: 2024-11-06
Payer: OTHER GOVERNMENT

## 2024-11-06 NOTE — TELEPHONE ENCOUNTER
Spoke with the patient to schedule the first acupuncture appt. Pt is aware that her insurance denied. Pt agreed to self pay. She voiced acceptance of date/time. They were made aware to arrive early to allow time for check in and complete paperwork. Location was reviewed.

## 2024-11-07 ENCOUNTER — TELEPHONE (OUTPATIENT)
Dept: PSYCHIATRY | Facility: CLINIC | Age: 59
End: 2024-11-07
Payer: OTHER GOVERNMENT

## 2024-11-08 ENCOUNTER — TELEPHONE (OUTPATIENT)
Dept: HEMATOLOGY/ONCOLOGY | Facility: CLINIC | Age: 59
End: 2024-11-08
Payer: OTHER GOVERNMENT

## 2024-11-08 NOTE — TELEPHONE ENCOUNTER
Spoke to pt to remind of appt on 11/11/24 with Dr. Bernardo. Pt verbalized understanding and confirmed appt Location was discussed.

## 2024-11-11 ENCOUNTER — TELEPHONE (OUTPATIENT)
Dept: PSYCHIATRY | Facility: CLINIC | Age: 59
End: 2024-11-11
Payer: OTHER GOVERNMENT

## 2024-11-11 ENCOUNTER — TELEPHONE (OUTPATIENT)
Dept: HEMATOLOGY/ONCOLOGY | Facility: CLINIC | Age: 59
End: 2024-11-11
Payer: OTHER GOVERNMENT

## 2024-11-11 NOTE — TELEPHONE ENCOUNTER
Returned call to pt. Appt for 11/11/24 was cancelled and r/s for 11/12/24 as per pt request. Pt verbalized understanding, confirmed new day/time and thanked me for calling.  ----- Message from Alberta sent at 11/11/2024 10:46 AM CST -----  Type: Needs Medical Advice  Who Called:  Patient   Symptoms (please be specific):    How long has patient had these symptoms:    Pharmacy name and phone #:    Best Call Back Number: [phone]  Additional Information: Patient is requesting a call back to reschedule her appt. with Dr. Bernardo on 11/11.

## 2024-11-12 ENCOUNTER — TELEPHONE (OUTPATIENT)
Dept: HEMATOLOGY/ONCOLOGY | Facility: CLINIC | Age: 59
End: 2024-11-12
Payer: OTHER GOVERNMENT

## 2024-11-12 NOTE — TELEPHONE ENCOUNTER
Returned call to pt she is not feeling well today and wants to reschedule her acup apt; pt accepted the apt on 11/19/24 at 2:00 pm. Location and check-in process were discussed  ----- Message from Alberta sent at 11/12/2024  8:47 AM CST -----  Type: Needs Medical Advice  Who Called:  Patient   Symptoms (please be specific):    How long has patient had these symptoms:    Pharmacy name and phone #:    Best Call Back Number: [phone]  Additional Information: Patient is requesting a call back to reschedule her appt. on 11/12 at 10:30.

## 2024-11-14 ENCOUNTER — OFFICE VISIT (OUTPATIENT)
Dept: HEMATOLOGY/ONCOLOGY | Facility: CLINIC | Age: 59
End: 2024-11-14
Payer: OTHER GOVERNMENT

## 2024-11-14 VITALS
TEMPERATURE: 98 F | WEIGHT: 95.88 LBS | BODY MASS INDEX: 17.64 KG/M2 | RESPIRATION RATE: 20 BRPM | HEIGHT: 62 IN | OXYGEN SATURATION: 90 % | HEART RATE: 79 BPM | DIASTOLIC BLOOD PRESSURE: 69 MMHG | SYSTOLIC BLOOD PRESSURE: 123 MMHG

## 2024-11-14 DIAGNOSIS — T45.1X5A CHEMOTHERAPY-INDUCED PERIPHERAL NEUROPATHY: ICD-10-CM

## 2024-11-14 DIAGNOSIS — Z79.899 IMMUNODEFICIENCY DUE TO CHEMOTHERAPY: ICD-10-CM

## 2024-11-14 DIAGNOSIS — G62.0 CHEMOTHERAPY-INDUCED PERIPHERAL NEUROPATHY: ICD-10-CM

## 2024-11-14 DIAGNOSIS — C78.6 SECONDARY MALIGNANT NEOPLASM OF PARIETAL PERITONEUM: ICD-10-CM

## 2024-11-14 DIAGNOSIS — D84.821 IMMUNODEFICIENCY DUE TO CHEMOTHERAPY: ICD-10-CM

## 2024-11-14 DIAGNOSIS — C78.7 SECONDARY LIVER CANCER: ICD-10-CM

## 2024-11-14 DIAGNOSIS — C78.02 MALIGNANT NEOPLASM METASTATIC TO BOTH LUNGS: ICD-10-CM

## 2024-11-14 DIAGNOSIS — T45.1X5A IMMUNODEFICIENCY DUE TO CHEMOTHERAPY: ICD-10-CM

## 2024-11-14 DIAGNOSIS — C16.9 GASTRIC ADENOCARCINOMA: Primary | ICD-10-CM

## 2024-11-14 DIAGNOSIS — C78.01 MALIGNANT NEOPLASM METASTATIC TO BOTH LUNGS: ICD-10-CM

## 2024-11-14 DIAGNOSIS — G89.3 NEOPLASM RELATED PAIN: ICD-10-CM

## 2024-11-14 PROCEDURE — 99999 PR PBB SHADOW E&M-EST. PATIENT-LVL V: CPT | Mod: PBBFAC,,, | Performed by: INTERNAL MEDICINE

## 2024-11-14 PROCEDURE — 99215 OFFICE O/P EST HI 40 MIN: CPT | Mod: PBBFAC,PN | Performed by: INTERNAL MEDICINE

## 2024-11-14 PROCEDURE — G2211 COMPLEX E/M VISIT ADD ON: HCPCS | Mod: S$PBB,,, | Performed by: INTERNAL MEDICINE

## 2024-11-14 PROCEDURE — 99215 OFFICE O/P EST HI 40 MIN: CPT | Mod: S$PBB,,, | Performed by: INTERNAL MEDICINE

## 2024-11-14 RX ORDER — HYOSCYAMINE SULFATE 0.12 MG/1
TABLET SUBLINGUAL
COMMUNITY
Start: 2024-04-23

## 2024-11-14 RX ORDER — PROMETHAZINE HYDROCHLORIDE 6.25 MG/5ML
SYRUP ORAL
COMMUNITY
Start: 2024-10-10

## 2024-11-14 NOTE — PROGRESS NOTES
Subjective     Patient ID: Felicia Ruffin is a 59 y.o. female.    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   Reviewed immunohistochemistry results from [facility]: CLDN18 negative, ERBB2 equivocal at 2+.  Microsatellite stable/ PDL1 negative CPS 0      Pharmacogenomic profile (11/1/2024): DPD normal. Significant UGT1A1  -significant weight loss that she had initially attributed to Ozempic but more recently has had solid food dysphagia and anemia  -10/18/2022: EGD: stomach cardia biopsies: invasive mod diff adeno  -10/26/2022: CT CAP: Faint density in the right lung would recommend follow-up studies. No evidence of metastatic disease a localized spread.  -11/2/2022: PET/CT concerning for potential carcinomatosis with several pet avid abdominal soft tissue nodules.  -11/8/2022: diagnostic laparoscopy omentectomy: metastatic mod diff adeno . gross peritoneal disease   -11/16/2022: C1D1 FOLFOX.  She received 9 cycles of the FOLFOX at full dose of oxaliplatin.  On 04/19/2023 cycle 10 FOLFOX oxaliplatin was dose reduced to 65 mg/m2  On 05/17/2023 she received 5 FU alone with no oxaliplatin and started maintenance 5 FU after that     In June 20, 2023 she went to see [facility] to discuss HIPEC but was not recommend.  She returned back to [facility] and continued her 5 FU maintenance chemotherapy  In August of 2023:  EGD showed known severe reflux esophagitis with no bleeding biopsied, there was large amount of food residue in the stomach, but no residual tumor identified although the presence of food could have interfered with  visualization     9/7/2023:  She had gastric emptying study which showed 79% retention of food after 90 minutes.  She received cycle 19 of maintenance 5 FU as of 9/27/2023     CT scans 10/9/2023:  Following an [facility] visit with the recommendation to stop FOLFOX secondary to disease progression with new hepatic and peritoneal disease and new left gastric adenopathy.  [facility] recommendation included clinical trial with Keytruda and lenvatinib versus FOLFIRI     After long discussion she decided to proceed with FOLFIRI.     Cycle 1 of FOLFIRI was on 10/25/2023     Repeat EGD 12/24/2023 with decrease in size of the tumor and nonobstructing lesion in the gastric body.  Pathology showed gastric adenocarcinoma     12/08/2023:  MRI abdomen with and without contrast at [facility] shows no definitive enhancing lesion of the gastric body no adjacent lymph nodes and no evidence of metastatic disease in the liver.     12/11/2023 CT chest abdomen pelvis also at [facility]:  Stable irregularly marginated right lower lobe nodule 8 mm not significantly changed, stable hepatic metastasis.  Multiple peritoneal implants not significantly changed, some more confluent and marginally larger.  Left gastric peripancreatic lymphadenopathy not significantly changed.  New 0.6 cm hypoattenuating lesion in segment 4 of the liver.  Multifocal omental implants with 1 particular omental implant now measuring 3 x 1 cm compared to 2 x 1 cm     Between 12/13/2023 through 02/07/2024 she received cycle 5 through cycle 9 of chemotherapy with FOLFIRI     On 02/21/2024 she received 5 FU only and irinotecan was omitted     On 03/04/2024 she underwent EGD for Botox.  She was noted to have a GE junction stricture with stenosis and biopsies were taken and Botox was injected     On 03/14/2024 she had a CT chest abdomen pelvis and that showed a slight increase in the size of the right lower lobe compared to prior (10 mm from 8 mm) stable  metastatic disease in the liver, interval dislodgment of the esophageal stent with distal migration into the stomach.  Malignant implant in the right anterior abdominal wall and omental and mesenteric metastatic implants as well as lymph nodes, left lower quadrant omental lymph nodes and implants measuring 11 mm, 9.5 mm, 12 mm, with mild bilateral hydronephrosis.  Omental lesion 3 cm prior 2 cm.  Right rectus muscle anterior focal thickening measuring 2.3 cm likely a malignant implant.     PEG Tube placed in 3/19/2024    On 04/05/2024 she underwent PET scan:  Revealed severe thickening along the gastroesophageal junction, gastric cardia with radiotracer activity consistent with malignancy significantly worsened in the interval.  Multiple new metastatic liver lesions, moderately enlarged peritoneal omental implants.  Right lower lobe nodule slightly larger, and right upper intramuscular rectus abdominal mass consistent with malignancy with tract extending to the skin surface.  Bilateral severe hydronephrosis     On 04/24/2024 she was started on paclitaxel plus ramcirumab.  She received cycle 2 on 05/22/2024.  Her CEA was 157.9.   She received cycle 3 on 06/19/2024, CEA on that day was 56.1     Repeat PET scan 07/15/2024, revealed complete positive response to the therapy with near complete resolution of uptake within the liver lesions and the stomach.  CEA was 49.2     On 07/31/2024 she received cycle 4 of paclitaxel ramcirumab.  Repeat CEA on 08/07/2024 86.4     On 08/28/2024 she received cycle 5 of paclitaxel ramcirumab with CEA of 177.2     She then started Lonsurf on 09/21/2024  She saw Dr. Zhou at [facility] on 10/07/2024 and was recommended a phase 1 clinical trial           All of her treatments so far have been at [facility] with consults at [facility]       CT CAP W contrast at [facility] on 10/06/2024 (compared to scans at [facility] on 12/11/2023)  1. Interval worsening of pulmonary and  "hepatic metastasis. 2. Interval worsening of carcinomatosis in the abdomen and development of trace fluid in the abdomen. 3. New mass lesion involving the right rectus musculature. ACTIONABLE ITEMS/RECOMMENDATIONS*: See impression *An Actionable Finding is a finding that may be unrelated to the original reason for imaging but potentially actionable, meaning further investigation may be necessary. The Actionable Findings Vigilance Unit (AFVU) assists medical providers with responding to additional radiologic findings that are unexpected and potentially actionable."     Discussed with Dr. Que Zhou at [facility], who notes that they they have not sent a claudin 18 at [facility] but she has peritoneal biopsy specimen and she will send that.  We discussed continuing Lonsurf unless it stops working.  We discussed Keytruda plus lenvatinib off-label use based on phase 2 data from [place].  If insurance does not approve the combination then we could try to obtain Keytruda on a compassionate use.     If she turns out to be claudin 18 positive then we could try to obtain FOLFOX and zolbetuximab and then plan on either avoiding oxaliplatin or dose reducing oxaliplatin due to her neuropathy  She is seeing [facility] here for acupuncture       HPIElizabeth comes in to review above recs. She notes pain and blistering at the site of the PEG tube. She is not using it for nutrition. She is eating by mouth.  She has anal fissure and hemorrhoids   She is starting Lonsurf cycle 3 at [facility] on 11/16/2024 will complete on 11/27/2204       Review of Systems   Constitutional:  Negative for appetite change and unexpected weight change.   HENT:  Negative for mouth sores.    Eyes:  Negative for visual disturbance.   Respiratory:  Negative for cough and shortness of breath.    Cardiovascular:  Negative for chest pain.   Gastrointestinal:  Positive for abdominal pain. Negative for diarrhea.   Genitourinary:  Negative for frequency. "   Musculoskeletal:  Negative for back pain.   Integumentary:  Negative for rash.   Neurological:  Negative for headaches.   Hematological:  Negative for adenopathy.   Psychiatric/Behavioral:  The patient is not nervous/anxious.           Objective     Physical Exam  Constitutional:       Appearance: She is ill-appearing.   HENT:      Head: Normocephalic and atraumatic.      Nose: Nose normal.   Cardiovascular:      Rate and Rhythm: Normal rate and regular rhythm.   Pulmonary:      Effort: Pulmonary effort is normal.      Breath sounds: Normal breath sounds.   Abdominal:      General: There is distension.      Palpations: There is mass.      Tenderness: There is abdominal tenderness.      Comments: PEG tube site with ulcerated area. Mass palpable in the right upper quadrant, tender    Musculoskeletal:         General: Normal range of motion.      Cervical back: Normal range of motion.   Skin:     General: Skin is warm and dry.   Neurological:      General: No focal deficit present.      Mental Status: She is alert and oriented to person, place, and time.   Psychiatric:         Mood and Affect: Mood normal.         Behavior: Behavior normal.              LABS:  WBC   Date Value Ref Range Status   11/01/2024 3.57 (L) 3.90 - 12.70 K/uL Final     Hemoglobin   Date Value Ref Range Status   11/01/2024 8.5 (L) 12.0 - 16.0 g/dL Final     Hematocrit   Date Value Ref Range Status   11/01/2024 26.7 (L) 37.0 - 48.5 % Final     Platelets   Date Value Ref Range Status   11/01/2024 139 (L) 150 - 450 K/uL Final     Gran # (ANC)   Date Value Ref Range Status   11/01/2024 3.1 1.8 - 7.7 K/uL Final     Comment:     The ANC is based on a white cell differential from an   automated cell counter. It has not been microscopically   reviewed for the presence of abnormal cells. Clinical   correlation is required.         Chemistry        Component Value Date/Time     11/01/2024 0939    K 3.8 11/01/2024 0939     11/01/2024 0939     CO2 25 11/01/2024 0939    BUN 11 11/01/2024 0939    CREATININE 0.5 11/01/2024 0939     (H) 11/01/2024 0939        Component Value Date/Time    CALCIUM 9.3 11/01/2024 0939    ALKPHOS 592 (H) 11/01/2024 0939    AST 51 (H) 11/01/2024 0939    ALT 54 (H) 11/01/2024 0939    BILITOT 0.9 11/01/2024 0939    ESTGFRAFRICA >60 05/20/2022 0752    EGFRNONAA >60 05/20/2022 0752          Assessment and Plan     1. Gastric adenocarcinoma  -     Ambulatory referral/consult to CLINIC Palliative Care; Future; Expected date: 11/21/2024  -     CBC w/ DIFF; Future; Expected date: 11/14/2024  -     CMP; Future; Expected date: 11/14/2024  -     CT Chest Abdomen Pelvis With IV Contrast (XPD) Routine Oral Contrast; Future; Expected date: 11/14/2024    2. Secondary liver cancer  -     Ambulatory referral/consult to CLINIC Palliative Care; Future; Expected date: 11/21/2024    3. Malignant neoplasm metastatic to both lungs    4. Secondary malignant neoplasm of parietal peritoneum    5. 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 Chemotherapy-induced peripheral neuropathy      Route Chart for Scheduling    Med Onc Chart Routing      Follow up with physician . On 12/6 schedule CBc, CMp, CT chest, abdomen/pelvis. On 12/10 schedule to see me and palliative care (on same day)   Follow up with HANK    Infusion scheduling note    Injection scheduling note    Labs    Imaging    Pharmacy appointment    Other referrals                    Therapy Plan Information  FERAHEME (FERUMOXYTOL) TWO DOSES for Iron deficiency anemia, unspecified, noted on 7/9/2020  Medications  ferumoxytoL (FERAHEME) 510 mg in dextrose 5 % 100 mL IVPB  510 mg, Intravenous, Every visit  Flushes  heparin, porcine (PF) 100 unit/mL injection flush 500 Units  500 Units, Intravenous, PRN  sodium chloride 0.9% flush 10 mL  10 mL, Intravenous, Every visit  sodium  chloride 0.9% 100 mL flush bag  Intravenous, Every visit  PRN Medications  EPINEPHrine (EPIPEN) 0.3 mg/0.3 mL pen injection 0.3 mg  0.3 mg, Intramuscular, PRN  diphenhydrAMINE injection 50 mg  50 mg, Intravenous, PRN  methylPREDNISolone sodium succinate injection 125 mg  125 mg, Intravenous, PRN  sodium chloride 0.9% bolus 1,000 mL  1,000 mL, Intravenous, PRN    PORT FLUSH for Gastric adenocarcinoma, noted on 7/5/2023  Flushes  heparin, porcine (PF) 100 unit/mL injection flush 500 Units  500 Units, Intravenous, Every visit  sodium chloride 0.9% flush 10 mL  10 mL, Intravenous, Every visit      No therapy plan of the specified type found.         Mrs. Ruffin comes in to review recs after my discussion with Dr. Zhou at [facility].   Claudin-18 was tested at [facility] and was negative, so she does not qualify for Zolbetuximab based treatment as well as clinical trials based off of Claudin-18    She is currently on Lonsurf and seems to be tolerating it reasonably well, she will start cycle 3 on 11/16/2024.  She has labs scheduled at [facility] to start the cycle.  She plans to switch to [facility] after that cycle is completed and would like to do her scans here.  So we will schedule her for CT scans in early December and then see her back after that.    We unfortunately do not have any clinical trials for her diagnosis at this time but per her request reached back to [facility] to Dr. Zhou and they will contact her with phase 1 clinical trials    Chemo induced neuropathy she will continue with acupuncture    Neoplasm related pain she does want to switch to palliative Care here at [facility] so referral was placed      Visit today included increased complexity associated with the care of the episodic problem chemotherapy addressed and managing the longitudinal care of the patient due to the serious and/or complex managed problem(s) metastatic gastric cancer      Above care plan was discussed with  patient and all questions were addressed to her satisfaction

## 2024-11-15 ENCOUNTER — PATIENT MESSAGE (OUTPATIENT)
Dept: HEMATOLOGY/ONCOLOGY | Facility: CLINIC | Age: 59
End: 2024-11-15
Payer: OTHER GOVERNMENT

## 2024-11-18 ENCOUNTER — TELEPHONE (OUTPATIENT)
Dept: HEMATOLOGY/ONCOLOGY | Facility: CLINIC | Age: 59
End: 2024-11-18
Payer: OTHER GOVERNMENT

## 2024-11-18 NOTE — TELEPHONE ENCOUNTER
Spoke to pt to remind of appt tomorrow with Dr. Bernardo. Pt verbalized understanding and confirmed appt Location was discussed.

## 2024-11-19 ENCOUNTER — CLINICAL SUPPORT (OUTPATIENT)
Dept: REHABILITATION | Facility: HOSPITAL | Age: 59
End: 2024-11-19
Payer: OTHER GOVERNMENT

## 2024-11-19 DIAGNOSIS — R45.89 ANXIETY ABOUT HEALTH: ICD-10-CM

## 2024-11-19 DIAGNOSIS — G62.0 CHEMOTHERAPY-INDUCED PERIPHERAL NEUROPATHY: ICD-10-CM

## 2024-11-19 DIAGNOSIS — T45.1X5A CHEMOTHERAPY-INDUCED PERIPHERAL NEUROPATHY: ICD-10-CM

## 2024-11-19 DIAGNOSIS — M54.59 OTHER LOW BACK PAIN: Primary | ICD-10-CM

## 2024-11-19 PROCEDURE — 97810 ACUP 1/> WO ESTIM 1ST 15 MIN: CPT | Mod: PN | Performed by: ACUPUNCTURIST

## 2024-11-19 PROCEDURE — 97811 ACUP 1/> W/O ESTIM EA ADD 15: CPT | Mod: PN | Performed by: ACUPUNCTURIST

## 2024-11-19 NOTE — PROGRESS NOTES
Acupuncture Evaluation Note     Name: Felicia Ruffin  Clinic Number: [id number]    Traditional Chinese Medicine (TCM) Diagnosis: Qi Stagnation, Blood Stasis, Qi Deficiency, Blood Deficiency, Wind , Damp, and Yin Deficiency  Medical Diagnosis:   1. Other low back pain    2. Chemotherapy-induced peripheral neuropathy    3. Anxiety about health         Evaluation Date: 11/19/2024    Visit #/Visits authorized:     Precautions: Standard    Subjective     Chief Concern: Chemotherapy-induced peripheral neuropathy (Patient reports neuropathic pain in upper and lower extremities, which is since she began her cancer therapies.  Today her neuropathy is rated as 5/10 and primarily in the lower extremities.  )        Medical necessity is demonstrated by the following IMPAIRMENTS: Medical Necessity: Decreased mobility limits day to day activities, social, and emergent situations              Aggravating Factors:  movement   Relieving Factors:  nothing    Symptom Description:     Quality:  Aching, Dull, numb and tingling  Severity:  2-5/10  Frequency:  every day    Previous Treatments Tried:  Medication      Digestion:     Diet: Pt on feeding tube   Fluids:  is drinking moderate amounts of fluids,         Sleep: 2/10 insomnia    Energy Levels:  5/10 fatigue    Psychological Symptoms:  2/10 depression, 3/10 anxiety     Other Symptoms: 1/10 nausea, headaches, sob, 2/10 appetite, 4/10 neuropathy and wellbeing concerns     GYN Symptoms: 1/10 hot flashes    Objective     Observation: Patient reports severe neuropathic pain in feet and fingers.  Today it's 5/10 but most days 7/10 or higher.  Her low back pain today is 2/10 bilateral and diffuse    Pulse:        thready       New Findings:  na    Treatment     Treatment Principles:  move qi and blood, relieve bi, clear channels, nourish yin, calm orozco    Acupuncture points used:  4 BORDEN, BA JEANMARIE, BA BRANDON , Du20, ER OLIVER, Gb34, Sp10, Sp6, Sp9, St36, and YIN BENITEZ    Bilateral  points:  Unilateral points:  Auricular Treatment:  orozco men    Needles In: 31  Needles Out: 31  Needles W/O STIM placed: 205  [place] W/O STIM removed: 225      Other Traditional Chinese Medicine Modalities -  infrared heat    Assessment     After treatment, patient felt relief and plans to continue as recommended.      Patient prognosis is Fair.     Patient will continue to benefit from acupuncture treatment to address the deficits listed in the problem list box on initial evaluation, provide patient family education and to maximize pt's level of independence in the home and community environment.     Patient's spiritual, cultural and educational needs considered and pt agreeable to plan of care and goals.     Anticipated barriers to treatment: none    Plan     Recommend 2 /week for 6 sessions then re-assess.      Education:  Patient is aware of cumulative benefit of acupuncture

## 2024-11-21 ENCOUNTER — TELEPHONE (OUTPATIENT)
Dept: HEMATOLOGY/ONCOLOGY | Facility: CLINIC | Age: 59
End: 2024-11-21
Payer: OTHER GOVERNMENT

## 2024-11-21 ENCOUNTER — TELEPHONE (OUTPATIENT)
Dept: PSYCHIATRY | Facility: CLINIC | Age: 59
End: 2024-11-21
Payer: OTHER GOVERNMENT

## 2024-11-21 NOTE — TELEPHONE ENCOUNTER
Called and spoke with patient. Patient notified that Dr Wallace will be on leave. Patient agreed to see Dr Acevedo on 12/10/24 at 2. Patient was a Dr Ace patient.

## 2024-11-22 ENCOUNTER — TELEPHONE (OUTPATIENT)
Dept: HEMATOLOGY/ONCOLOGY | Facility: CLINIC | Age: 59
End: 2024-11-22
Payer: OTHER GOVERNMENT

## 2024-11-22 NOTE — TELEPHONE ENCOUNTER
Spoke to pt to remind of appt on 11/25/24 with Dr. Bernardo. Pt verbalized understanding and confirmed appt Location was discussed.

## 2024-11-25 ENCOUNTER — CLINICAL SUPPORT (OUTPATIENT)
Dept: REHABILITATION | Facility: HOSPITAL | Age: 59
End: 2024-11-25
Payer: OTHER GOVERNMENT

## 2024-11-25 ENCOUNTER — PATIENT MESSAGE (OUTPATIENT)
Dept: HEMATOLOGY/ONCOLOGY | Facility: CLINIC | Age: 59
End: 2024-11-25
Payer: OTHER GOVERNMENT

## 2024-11-25 DIAGNOSIS — T45.1X5A CHEMOTHERAPY-INDUCED PERIPHERAL NEUROPATHY: ICD-10-CM

## 2024-11-25 DIAGNOSIS — M54.59 OTHER LOW BACK PAIN: Primary | ICD-10-CM

## 2024-11-25 DIAGNOSIS — R45.89 ANXIETY ABOUT HEALTH: ICD-10-CM

## 2024-11-25 DIAGNOSIS — C16.9 GASTRIC ADENOCARCINOMA: Primary | ICD-10-CM

## 2024-11-25 DIAGNOSIS — G62.0 CHEMOTHERAPY-INDUCED PERIPHERAL NEUROPATHY: ICD-10-CM

## 2024-11-25 PROCEDURE — 97810 ACUP 1/> WO ESTIM 1ST 15 MIN: CPT | Mod: PN | Performed by: ACUPUNCTURIST

## 2024-11-25 PROCEDURE — 97811 ACUP 1/> W/O ESTIM EA ADD 15: CPT | Mod: PN | Performed by: ACUPUNCTURIST

## 2024-11-25 NOTE — PROGRESS NOTES
Acupuncture Follow-Up Note     Name: Felicia Ruffin  Clinic Number: [id number]    Traditional Chinese Medicine (TCM) Diagnosis: Qi Stagnation, Blood Stasis, Qi Deficiency, Blood Deficiency, Wind , Damp, Yin Deficiency, and Phlegm  Medical Diagnosis:   1. Other low back pain    2. Chemotherapy-induced peripheral neuropathy    3. Anxiety about health         Evaluation Date: 11/25/2024    Visit #/Visits authorized: 3/12    Precautions: Standard    Subjective     Chief Concern: Anxiety about health (5/10 over health issues) and Peripheral Neuropathy (Chemotherapy induced.  4/10)       Medical necessity is demonstrated by the following IMPAIRMENTS: Medical Necessity: Decreased mobility limits day to day activities, social, and emergent situations              Aggravating Factors:  movement     Relieving Factors:  rest    Symptom Description:     Quality:  Variable  Severity:  4-5/10  Frequency:  every day      Objective     Observation: Patient is anxious about health concerns and reports that first two visits to treat her neuropathy have her feet tingling, which she doesn't like.  Today we worked on the entire body through auricular therapy to see if she tolerates that protocol better.  Determination forthcoming     Pulse:        tight       New Findings:  na    Treatment     Treatment Principles:  move qi and blood, relieve bi, calm orozco    Acupuncture points used:  4 BORDEN    Bilateral points:  Unilateral points:  Auricular Treatment:  fingers, kidney, liver, orozco men, toe, and muscle relaxation    Needles In: 18  Needles Out: 18  Needles W/O STIM placed: 305  Needles W/O STIM removed: 325      Other Traditional Chinese Medicine Modalities -  infrared heat    Assessment     After treatment, patient felt to be reported at next visit     Patient prognosis is Fair.     Patient will continue to benefit from acupuncture treatment to address the deficits listed in the problem list box on initial evaluation, provide patient  family education and to maximize pt's level of independence in the home and community environment.     Patient's spiritual, cultural and educational needs considered and pt agreeable to plan of care and goals.     Anticipated barriers to treatment: none    Plan     Recommend 2 /week for 6 sessions then re-assess.      Education:  Patient is aware of cumulative benefit of acupuncture

## 2024-11-30 ENCOUNTER — PATIENT MESSAGE (OUTPATIENT)
Dept: HEMATOLOGY/ONCOLOGY | Facility: CLINIC | Age: 59
End: 2024-11-30
Payer: OTHER GOVERNMENT

## 2024-12-02 ENCOUNTER — TELEPHONE (OUTPATIENT)
Dept: HEMATOLOGY/ONCOLOGY | Facility: CLINIC | Age: 59
End: 2024-12-02
Payer: OTHER GOVERNMENT

## 2024-12-02 NOTE — TELEPHONE ENCOUNTER
Appts cancelled and message noted.  ----- Message from Alberta sent at 12/2/2024 11:02 AM CST -----  Type: Needs Medical Advice  Who Called:  Patient   Symptoms (please be specific):    How long has patient had these symptoms:    Pharmacy name and phone #:    Best Call Back Number: [phone]  Additional Information: Patient called to cancel her appts for Acupuncture on 12/2 and 12/6 and stated she will call back to reschedule at later time.

## 2024-12-05 ENCOUNTER — PATIENT MESSAGE (OUTPATIENT)
Dept: HEMATOLOGY/ONCOLOGY | Facility: CLINIC | Age: 59
End: 2024-12-05
Payer: OTHER GOVERNMENT

## 2024-12-05 DIAGNOSIS — C16.9 STOMACH CANCER: Primary | ICD-10-CM

## 2024-12-06 ENCOUNTER — TELEPHONE (OUTPATIENT)
Dept: HEMATOLOGY/ONCOLOGY | Facility: CLINIC | Age: 59
End: 2024-12-06
Payer: OTHER GOVERNMENT

## 2024-12-06 ENCOUNTER — HOSPITAL ENCOUNTER (OUTPATIENT)
Dept: RADIOLOGY | Facility: HOSPITAL | Age: 59
Discharge: HOME | End: 2024-12-06
Attending: INTERNAL MEDICINE
Payer: OTHER GOVERNMENT

## 2024-12-06 ENCOUNTER — INFUSION (OUTPATIENT)
Dept: INFUSION THERAPY | Facility: HOSPITAL | Age: 59
End: 2024-12-06
Attending: NURSE PRACTITIONER
Payer: OTHER GOVERNMENT

## 2024-12-06 ENCOUNTER — TELEPHONE (OUTPATIENT)
Dept: HEMATOLOGY/ONCOLOGY | Facility: HOSPITAL | Age: 59
End: 2024-12-06
Payer: OTHER GOVERNMENT

## 2024-12-06 DIAGNOSIS — I26.99 OTHER ACUTE PULMONARY EMBOLISM WITHOUT ACUTE COR PULMONALE: Primary | ICD-10-CM

## 2024-12-06 DIAGNOSIS — C16.9 GASTRIC ADENOCARCINOMA: Primary | ICD-10-CM

## 2024-12-06 DIAGNOSIS — C16.9 GASTRIC ADENOCARCINOMA: ICD-10-CM

## 2024-12-06 LAB
ALBUMIN SERPL BCP-MCNC: 3.2 G/DL (ref 3.5–5.2)
ALP SERPL-CCNC: 631 U/L (ref 40–150)
ALT SERPL W/O P-5'-P-CCNC: 23 U/L (ref 10–44)
ANION GAP SERPL CALC-SCNC: 11 MMOL/L (ref 8–16)
AST SERPL-CCNC: 22 U/L (ref 10–40)
BASOPHILS # BLD AUTO: 0 K/UL (ref 0–0.2)
BASOPHILS NFR BLD: 0 % (ref 0–1.9)
BILIRUB SERPL-MCNC: 1.6 MG/DL (ref 0.1–1)
BUN SERPL-MCNC: 16 MG/DL (ref 6–20)
CALCIUM SERPL-MCNC: 9 MG/DL (ref 8.7–10.5)
CHLORIDE SERPL-SCNC: 100 MMOL/L (ref 95–110)
CO2 SERPL-SCNC: 26 MMOL/L (ref 23–29)
CREAT SERPL-MCNC: 0.6 MG/DL (ref 0.5–1.4)
DIFFERENTIAL METHOD BLD: ABNORMAL
EOSINOPHIL # BLD AUTO: 0 K/UL (ref 0–0.5)
EOSINOPHIL NFR BLD: 1.2 % (ref 0–8)
ERYTHROCYTE [DISTWIDTH] IN BLOOD BY AUTOMATED COUNT: 19.5 % (ref 11.5–14.5)
EST. GFR  (NO RACE VARIABLE): >60 ML/MIN/1.73 M^2
GLUCOSE SERPL-MCNC: 129 MG/DL (ref 70–110)
HCT VFR BLD AUTO: 25.3 % (ref 37–48.5)
HGB BLD-MCNC: 8 G/DL (ref 12–16)
IMM GRANULOCYTES # BLD AUTO: 0.03 K/UL (ref 0–0.04)
IMM GRANULOCYTES NFR BLD AUTO: 0.9 % (ref 0–0.5)
LYMPHOCYTES # BLD AUTO: 0.5 K/UL (ref 1–4.8)
LYMPHOCYTES NFR BLD: 14.4 % (ref 18–48)
MCH RBC QN AUTO: 30 PG (ref 27–31)
MCHC RBC AUTO-ENTMCNC: 31.6 G/DL (ref 32–36)
MCV RBC AUTO: 95 FL (ref 82–98)
MONOCYTES # BLD AUTO: 0.9 K/UL (ref 0.3–1)
MONOCYTES NFR BLD: 28.7 % (ref 4–15)
NEUTROPHILS # BLD AUTO: 1.8 K/UL (ref 1.8–7.7)
NEUTROPHILS NFR BLD: 54.8 % (ref 38–73)
NRBC BLD-RTO: 0 /100 WBC
PLATELET # BLD AUTO: 173 K/UL (ref 150–450)
PMV BLD AUTO: 9.7 FL (ref 9.2–12.9)
POTASSIUM SERPL-SCNC: 3.3 MMOL/L (ref 3.5–5.1)
PROT SERPL-MCNC: 6.7 G/DL (ref 6–8.4)
RBC # BLD AUTO: 2.67 M/UL (ref 4–5.4)
SODIUM SERPL-SCNC: 137 MMOL/L (ref 136–145)
WBC # BLD AUTO: 3.27 K/UL (ref 3.9–12.7)

## 2024-12-06 PROCEDURE — 74177 CT ABD & PELVIS W/CONTRAST: CPT | Mod: 26,,, | Performed by: RADIOLOGY

## 2024-12-06 PROCEDURE — 80053 COMPREHEN METABOLIC PANEL: CPT | Mod: PN | Performed by: INTERNAL MEDICINE

## 2024-12-06 PROCEDURE — 71260 CT THORAX DX C+: CPT | Mod: TC,PO

## 2024-12-06 PROCEDURE — 25000003 PHARM REV CODE 250: Mod: PN | Performed by: INTERNAL MEDICINE

## 2024-12-06 PROCEDURE — 36591 DRAW BLOOD OFF VENOUS DEVICE: CPT | Mod: PN

## 2024-12-06 PROCEDURE — 63600175 PHARM REV CODE 636 W HCPCS: Mod: PN | Performed by: INTERNAL MEDICINE

## 2024-12-06 PROCEDURE — A4216 STERILE WATER/SALINE, 10 ML: HCPCS | Mod: PN | Performed by: INTERNAL MEDICINE

## 2024-12-06 PROCEDURE — 85025 COMPLETE CBC W/AUTO DIFF WBC: CPT | Mod: PN | Performed by: INTERNAL MEDICINE

## 2024-12-06 PROCEDURE — 71260 CT THORAX DX C+: CPT | Mod: 26,,, | Performed by: RADIOLOGY

## 2024-12-06 PROCEDURE — 25500020 PHARM REV CODE 255: Mod: PO | Performed by: INTERNAL MEDICINE

## 2024-12-06 PROCEDURE — A9698 NON-RAD CONTRAST MATERIALNOC: HCPCS | Mod: PO | Performed by: INTERNAL MEDICINE

## 2024-12-06 RX ORDER — HEPARIN 100 UNIT/ML
500 SYRINGE INTRAVENOUS
OUTPATIENT
Start: 2024-12-06

## 2024-12-06 RX ORDER — SODIUM CHLORIDE 0.9 % (FLUSH) 0.9 %
10 SYRINGE (ML) INJECTION
Status: DISCONTINUED | OUTPATIENT
Start: 2024-12-06 | End: 2024-12-06 | Stop reason: HOSPADM

## 2024-12-06 RX ORDER — HEPARIN 100 UNIT/ML
500 SYRINGE INTRAVENOUS
Status: DISCONTINUED | OUTPATIENT
Start: 2024-12-06 | End: 2024-12-06 | Stop reason: HOSPADM

## 2024-12-06 RX ORDER — SODIUM CHLORIDE 0.9 % (FLUSH) 0.9 %
10 SYRINGE (ML) INJECTION
OUTPATIENT
Start: 2024-12-06

## 2024-12-06 RX ADMIN — IOHEXOL 75 ML: 350 INJECTION, SOLUTION INTRAVENOUS at 10:12

## 2024-12-06 RX ADMIN — SODIUM CHLORIDE, PRESERVATIVE FREE 10 ML: 5 INJECTION INTRAVENOUS at 09:12

## 2024-12-06 RX ADMIN — IOHEXOL 1000 ML: 9 SOLUTION ORAL at 10:12

## 2024-12-06 RX ADMIN — Medication 500 UNITS: at 09:12

## 2024-12-06 NOTE — PROGRESS NOTES
Sent Lenvatinib 14 mg to [facility] and put in orders fro Keytruda   SUbmit for auth please

## 2024-12-06 NOTE — TELEPHONE ENCOUNTER
CT scans preliminary from today reveals  focal nonocclusive PE in her left lower lobe. Also looks like worsening hepatic and new splenic metastatic dz.     Spoke with patient about the preliminary report and E scribed Eliquis to her [facility].      Since she is progressing on Lonsurf we will go ahead and obtain authorization for lenvatinib and Keytruda    Keytruda orders were placed and lenvatinib we will be sent to [facility],     Also reached out to Dr. Que Zhou at [facility] to review above plan and verify lenvatinib dosing

## 2024-12-06 NOTE — TELEPHONE ENCOUNTER
Called and spoke to Beverly [facility] and verified patient's dose of Lonsurf medication.

## 2024-12-06 NOTE — TELEPHONE ENCOUNTER
----- Message from Alberta sent at 12/6/2024  1:32 PM CST -----  Type: Needs Medical Advice  Who Called:  Beverly with [facility]  Symptoms (please be specific):    How long has patient had these symptoms:    Pharmacy name and phone #:    Best Call Back Number:  ref#[phone]  Additional Information: Beverly is requesting a call back regarding clarification on patient meds..

## 2024-12-10 ENCOUNTER — OFFICE VISIT (OUTPATIENT)
Dept: HEMATOLOGY/ONCOLOGY | Facility: CLINIC | Age: 59
End: 2024-12-10
Payer: OTHER GOVERNMENT

## 2024-12-10 ENCOUNTER — OFFICE VISIT (OUTPATIENT)
Dept: PALLIATIVE MEDICINE | Facility: CLINIC | Age: 59
End: 2024-12-10
Payer: OTHER GOVERNMENT

## 2024-12-10 ENCOUNTER — OFFICE VISIT (OUTPATIENT)
Dept: PSYCHIATRY | Facility: CLINIC | Age: 59
End: 2024-12-10

## 2024-12-10 VITALS
SYSTOLIC BLOOD PRESSURE: 116 MMHG | WEIGHT: 92.81 LBS | DIASTOLIC BLOOD PRESSURE: 75 MMHG | RESPIRATION RATE: 16 BRPM | SYSTOLIC BLOOD PRESSURE: 116 MMHG | TEMPERATURE: 98 F | RESPIRATION RATE: 16 BRPM | HEART RATE: 91 BPM | WEIGHT: 92.81 LBS | HEIGHT: 62 IN | DIASTOLIC BLOOD PRESSURE: 75 MMHG | BODY MASS INDEX: 17.08 KG/M2 | OXYGEN SATURATION: 98 % | HEIGHT: 62 IN | BODY MASS INDEX: 17.08 KG/M2 | TEMPERATURE: 98 F | OXYGEN SATURATION: 98 % | HEART RATE: 91 BPM

## 2024-12-10 DIAGNOSIS — F06.31 DEPRESSION DUE TO PHYSICAL ILLNESS: Primary | ICD-10-CM

## 2024-12-10 DIAGNOSIS — G89.3 NEOPLASM RELATED PAIN: ICD-10-CM

## 2024-12-10 DIAGNOSIS — K59.03 THERAPEUTIC OPIOID-INDUCED CONSTIPATION (OIC): Primary | ICD-10-CM

## 2024-12-10 DIAGNOSIS — E03.9 HYPOTHYROIDISM, UNSPECIFIED TYPE: ICD-10-CM

## 2024-12-10 DIAGNOSIS — Z51.5 PALLIATIVE CARE BY SPECIALIST: ICD-10-CM

## 2024-12-10 DIAGNOSIS — R53.83 FATIGUE, UNSPECIFIED TYPE: ICD-10-CM

## 2024-12-10 DIAGNOSIS — C78.02 MALIGNANT NEOPLASM METASTATIC TO BOTH LUNGS: ICD-10-CM

## 2024-12-10 DIAGNOSIS — Z71.89 ACP (ADVANCE CARE PLANNING): ICD-10-CM

## 2024-12-10 DIAGNOSIS — K12.30 ORAL MUCOSITIS: ICD-10-CM

## 2024-12-10 DIAGNOSIS — C78.6 SECONDARY MALIGNANT NEOPLASM OF PARIETAL PERITONEUM: ICD-10-CM

## 2024-12-10 DIAGNOSIS — T40.2X5A THERAPEUTIC OPIOID-INDUCED CONSTIPATION (OIC): Primary | ICD-10-CM

## 2024-12-10 DIAGNOSIS — C16.9 GASTRIC ADENOCARCINOMA: Primary | ICD-10-CM

## 2024-12-10 DIAGNOSIS — F41.1 GENERALIZED ANXIETY DISORDER: ICD-10-CM

## 2024-12-10 DIAGNOSIS — C16.9 GASTRIC ADENOCARCINOMA: ICD-10-CM

## 2024-12-10 DIAGNOSIS — C78.7 SECONDARY LIVER CANCER: ICD-10-CM

## 2024-12-10 DIAGNOSIS — C78.01 MALIGNANT NEOPLASM METASTATIC TO BOTH LUNGS: ICD-10-CM

## 2024-12-10 DIAGNOSIS — Z79.899 IMMUNODEFICIENCY DUE TO CHEMOTHERAPY: ICD-10-CM

## 2024-12-10 DIAGNOSIS — T45.1X5A IMMUNODEFICIENCY DUE TO CHEMOTHERAPY: ICD-10-CM

## 2024-12-10 DIAGNOSIS — D84.821 IMMUNODEFICIENCY DUE TO CHEMOTHERAPY: ICD-10-CM

## 2024-12-10 PROCEDURE — 99999 PR PBB SHADOW E&M-EST. PATIENT-LVL II: CPT | Mod: PBBFAC,,, | Performed by: COUNSELOR

## 2024-12-10 PROCEDURE — G2211 COMPLEX E/M VISIT ADD ON: HCPCS | Mod: S$PBB,,, | Performed by: INTERNAL MEDICINE

## 2024-12-10 PROCEDURE — 99215 OFFICE O/P EST HI 40 MIN: CPT | Mod: PBBFAC,27,PN | Performed by: NURSE PRACTITIONER

## 2024-12-10 PROCEDURE — 99215 OFFICE O/P EST HI 40 MIN: CPT | Mod: S$PBB,,, | Performed by: INTERNAL MEDICINE

## 2024-12-10 PROCEDURE — 99999 PR PBB SHADOW E&M-EST. PATIENT-LVL V: CPT | Mod: PBBFAC,,, | Performed by: NURSE PRACTITIONER

## 2024-12-10 PROCEDURE — 99212 OFFICE O/P EST SF 10 MIN: CPT | Mod: PBBFAC,27,PN | Performed by: COUNSELOR

## 2024-12-10 PROCEDURE — 99215 OFFICE O/P EST HI 40 MIN: CPT | Mod: PBBFAC,PN | Performed by: INTERNAL MEDICINE

## 2024-12-10 PROCEDURE — 99999 PR PBB SHADOW E&M-EST. PATIENT-LVL V: CPT | Mod: PBBFAC,,, | Performed by: INTERNAL MEDICINE

## 2024-12-10 PROCEDURE — 99205 OFFICE O/P NEW HI 60 MIN: CPT | Mod: S$PBB,,, | Performed by: NURSE PRACTITIONER

## 2024-12-10 RX ORDER — NALOXONE HYDROCHLORIDE 4 MG/.1ML
SPRAY NASAL
Qty: 1 EACH | Refills: 11 | Status: SHIPPED | OUTPATIENT
Start: 2024-12-10

## 2024-12-10 RX ORDER — OXYCODONE HYDROCHLORIDE 5 MG/1
5 TABLET ORAL EVERY 4 HOURS PRN
Qty: 90 TABLET | Refills: 0 | Status: SHIPPED | OUTPATIENT
Start: 2024-12-10

## 2024-12-10 RX ORDER — NALOXEGOL OXALATE 25 MG/1
TABLET, FILM COATED ORAL
COMMUNITY
Start: 2024-05-14

## 2024-12-10 RX ORDER — SENNOSIDES 8.6 MG/1
2 TABLET ORAL DAILY
Qty: 60 TABLET | Refills: 0 | Status: SHIPPED | OUTPATIENT
Start: 2024-12-10 | End: 2025-01-09

## 2024-12-10 RX ORDER — OXYCODONE 18 MG/1
1 CAPSULE, EXTENDED RELEASE ORAL 2 TIMES DAILY
Qty: 60 CAPSULE | Refills: 0 | Status: SHIPPED | OUTPATIENT
Start: 2024-12-10 | End: 2025-01-09

## 2024-12-10 RX ORDER — METHYLPHENIDATE HYDROCHLORIDE 10 MG/1
TABLET ORAL
COMMUNITY
Start: 2023-12-19 | End: 2024-12-10 | Stop reason: SDUPTHER

## 2024-12-10 RX ORDER — METOCLOPRAMIDE 10 MG/1
10 TABLET ORAL
Qty: 90 TABLET | Refills: 0 | Status: SHIPPED | OUTPATIENT
Start: 2024-12-10 | End: 2025-01-09

## 2024-12-10 RX ORDER — METHYLPHENIDATE HYDROCHLORIDE 10 MG/1
10 TABLET ORAL DAILY
Qty: 30 TABLET | Refills: 0 | Status: SHIPPED | OUTPATIENT
Start: 2024-12-10 | End: 2025-01-09

## 2024-12-10 NOTE — PROGRESS NOTES
"Office Visit  [facility]      Consult Requested By: Dr. Meenakshi Pedroza  Reason for Consult: cancer related pain      ASSESSMENT/PLAN:     Plan/Recommendations:  Felicia Chapman" was seen today for palliative care.    Diagnoses and all orders for this visit:    Therapeutic opioid-induced constipation (OIC)  -     senna (SENOKOT) 8.6 mg tablet; Take 2 tablets by mouth once daily.              Continue lactulose as needed  Patient taking Linzess but not every day due to side effects of diarrhea  Patient also not taking Movantik    Gastric adenocarcinoma  Secondary liver cancer  Patient engaged with Dr Pedroza and will discuss plan of care today.     Neoplasm related pain  -  Previous patient of Dr Restrepo at [facility].  Resume current regiment- pain stable:  -     XTAMPZA ER 18 mg CSpT; Take 1 capsule (18 mg total) by mouth 2 (two) times daily.  -     oxyCODONE (ROXICODONE) 5 MG immediate release tablet; Take 1 tablet (5 mg total) by mouth every 4 (four) hours as needed for Pain (breakthrough).  -     naloxone (NARCAN) 4 mg/actuation Spry; 4mg by nasal route as needed for opioid overdose; may repeat every 2-3 minutes in alternating nostrils until medical help arrives. Call 911               ES tylenol as needed for moderate BT pain                Opioid contract signed today    Fatigue, unspecified type  -     methylphenidate HCl (RITALIN) 10 MG tablet; Take 1 tablet (10 mg total) by mouth once daily.    Palliative care by specialist  Introduced the role of Palliative Care to provide support for patients with serious illness  Patient is independent ADLS, no in home needs identified today  Patient following with Psych for mental health support  Provided with the following handouts:  -Tips for better sleep  - Tips to manage SOB  -Tips to manage constipation    ECOG-2  pHQ9 score- moderate    -     metoclopramide HCl (REGLAN) 10 MG tablet; Take 1 tablet (10 mg total) by mouth 3 (three) times daily before " "meals.    ACP (advance care planning)  Living Will and HCPOA documents provided for review      Understanding of illness: patient understands her cancer is not curable    Prognosis: poor    Goals of care: to continue trying treatment     Follow up: 1 month     SUBJECTIVE:     History of Present Illness:  Patient is a 59 y.o. year old female  with h/o gastric cancer with mets to live, She is a previous patient of [facility] who is now following with Dr Pedroza for second opinion. .Patient presents to [facility] with Palliative Care for pain and symptom management.  Introduced the role of Palliative Care, patient is familiar, she was engaged with Dr Murray . She tells me she was diagnosed with cancer  2 years ago in otber 2022, She was being followed by Dr Evans, she stated chemotherapy and did well for a while, s/p Peg tube placement.  She had recent imaging that showed disease progression . Dr Evans did not have anything else to offer, she presented to [facility] and was offered to wait for phase 1 drug trial, she was not interested in this. Patient wants to receive care close to home. She has a  spouse and 18 yo son who is a freshman in college with Asperger's . Patient will met with Dr Pedroza after this visit to discuss plan of care.         Oncology Note:  She underwent restaging CT CAP on 12/6/2024 which reveal "Interval worsening hepatic metastases,   2. Slight interval increase in size of several pulmonary nodules and stable to slight interval increase in size of the dominant spiculated nodular opacity in the right lower lobe, concerning for metastases.  3. Interval development of a 3.0 cm splenic mass concerning for metastasis.  4. Persistent abnormal soft tissue stranding and nodularity of the omentum concerning for peritoneal carcinomatosis.  5. New focal nonocclusive pulmonary embolus within the lateral basal segmental artery to the left lower lobe.  6. Small scattered trace peritoneal ascites, decreased in " "volume compared to prior exam; however, there is been interval increase in free pelvic fluid.  Mild body wall anasarca.  7. Enlarged peripancreatic lymph node, increased compared to the prior exam"     Since she is progressing on Lonsurf we will go ahead and obtain authorization for lenvatinib and Keytrud    Overall Assessment of Bodily functions     Pain  Abdominal and rectal pain ongoing. Pain score 4/10.  Pain worst to right side but also to Peg tube site    Patient has a rectal fissure and hemarhoids, s/p botox sept 2023- Dr Bardales  Current medications :  Oxycodone 10 mg every 4 hours  Xtampza 18 mg bid  Regiment helping and wants to continue for now.    Appetite  Tolerating tube feeds   Reglan gastroparesis    Sleep  Ongoing fatigue, she is prescribed ritalin winch helps but does affect sleep at night  She is getting rest during the daytime     Mood  -as expected, does have adjustments with mood, with current oncology diagnosis and treatment     Bowel Movement  Constipation- taking linzess every other day for diarrhea, "anus swells" after bowel movements and becomes painful, sometimes cannot sit, she uses Calmoseptine, - she has had Botox in the past and would be willing to do this again- this helped  Lactulose prn   miralax have tried-cause cramping   Discussed addition of Senokot - algorithm provided     Urination  -able to have normal urination    ACP  Discussed the importance of documenting her wishes for the type of care that is unacceptable in the future, also discussed the importance of establishing a HCPOA to make decisions for her when she is not able to. ACP documents provided for review.    ROS:  Review of Systems   Constitutional:  Negative for appetite change and unexpected weight change.   HENT:  Positive for mouth sores.    Eyes:  Negative for visual disturbance.   Respiratory:  Negative for cough and shortness of breath.    Cardiovascular:  Negative for chest pain.   Gastrointestinal:  Positive " for abdominal pain. Negative for diarrhea.   Genitourinary:  Negative for frequency.   Musculoskeletal:  Negative for back pain.   Skin:  Negative for rash.   Neurological:  Negative for headaches.   Hematological:  Negative for adenopathy.   Psychiatric/Behavioral:  The patient is nervous/anxious.        Past Medical History:   Diagnosis Date    Allergy     Asthma     Hyperlipidemia     Hypertension     Lumbago     PONV (postoperative nausea and vomiting)     Pre-diabetes     Skin sensitivity     Vitamin D deficiency      Past Surgical History:   Procedure Laterality Date    ANOSCOPY N/A 2023    Procedure: ANOSCOPY;  Surgeon: MARICRUZ Contreras MD;  [facility]);  Service: Endoscopy;  Laterality: N/A;  ok per DR. Titus w/ anesthesia to extend block by 2o min - and the ok to add on case per Diane     SECTION      times 1    COLONOSCOPY N/A 10/02/2020    Procedure: COLONOSCOPY;  Surgeon: Ortega Thao MD;  Location: [facility];  Service: Endoscopy;  Laterality: N/A;    DIAGNOSTIC LAPAROSCOPY N/A 2022    Procedure: LAPAROSCOPY, DIAGNOSTIC;  Surgeon: Tee Porter MD;  Location: [address];  Service: General;  Laterality: N/A;  Diag lap and port insertion    DILATION AND CURETTAGE OF UTERUS      ESOPHAGOGASTRODUODENOSCOPY N/A 10/18/2022    Procedure: EGD (ESOPHAGOGASTRODUODENOSCOPY);  Surgeon: CLINT Montanez MD;  Location: [facility];  Service: Endoscopy;  Laterality: N/A;    ESOPHAGOGASTRODUODENOSCOPY N/A 3/4/2024    Procedure: EGD (ESOPHAGOGASTRODUODENOSCOPY);  Surgeon: Sixto Baumann MD;  Location[facility];  Service: Endoscopy;  Laterality: N/A;    INSERTION OF TUNNELED CENTRAL VENOUS CATHETER (CVC) WITH SUBCUTANEOUS PORT Right 2022    Procedure: INSERTION, SINGLE LUMEN CATHETER WITH PORT, WITH FLUOROSCOPIC GUIDANCE;  Surgeon: Tee Potrer MD;  Location: [address];  Service: General;  Laterality: Right;  Diag lap and port insertion     LAPAROSCOPY  06/23/2023    TN LAPS ABD PRTM&OMENTUM DX W/WO SPEC BR/WA SPX    LATERAL INTERNAL ANAL SPHINCTEROTOMY N/A 06/29/2020    Procedure: SPHINCTEROTOMY, ANAL, INTERNAL, LATERAL;  Surgeon: Brigido Contreras MD;  Location: [facility] OR [address] [place] [address];  Service: Colon and Rectal;  Laterality: N/A;    tonsillectomy      TONSILLECTOMY       Family History   Problem Relation Name Age of Onset    Hypertension Mother      Stroke Father      Hypertension Father      Heart disease Father      Stroke Paternal Grandfather      Colon polyps Sister Maria Del Carmen     Prostate cancer Brother Gregory 57        s/p surg only    Breast cancer Maternal Aunt  90        unilat?    Obesity Maternal Aunt Nati     Prostate cancer Maternal Uncle [address] 44    Cancer Other Oskar         origin?    Prostate cancer Other Lemuel 52    Cancer Other          maybe    Heart disease Paternal Aunt      Heart disease Paternal Uncle Harvey     Heart disease Paternal Uncle Toño     Heart disease Paternal Uncle Elvis      Review of patient's allergies indicates:   Allergen Reactions    Other Itching     Tape-sensitive skin    Adhesive Hives and Rash     Okay to use paper tape//patient    Codeine Itching    Percodan [oxycodone hcl-oxycodone-asa] Itching    Vicodin [hydrocodone-acetaminophen] Itching    Cocoa Hives and Other (See Comments)     Agent:  Patient suspects she is allergic to the cocoa bean    Reactions:  Hives, fever    Dexamethasone Other (See Comments)     LIPS SWELLING    Dilaudid [hydromorphone]     Sunscreen Hives and Other (See Comments)     Reactions:  Hives, fever     Social Drivers of Health     Tobacco Use: Low Risk  (12/10/2024)    Patient History     Smoking Tobacco Use: Never     Smokeless Tobacco Use: Never     Passive Exposure: Past   Alcohol Use: Not At Risk (12/3/2024)    AUDIT-C     Frequency of Alcohol Consumption: Never     Average Number of Drinks: Patient does not drink     Frequency of Binge Drinking: Never   Financial Resource  Strain: Low Risk  (12/3/2024)    Overall Financial Resource Strain (CARDIA)     Difficulty of Paying Living Expenses: Not very hard   Food Insecurity: No Food Insecurity (12/3/2024)    Hunger Vital Sign     Worried About Running Out of Food in the Last Year: Never true     Ran Out of Food in the Last Year: Never true   Transportation Needs: No Transportation Needs (3/9/2024)    [facility], [facility]    PRAPARE - Transportation     Lack of Transportation (Medical): No     Lack of Transportation (Non-Medical): No   Physical Activity: Inactive (12/3/2024)    Exercise Vital Sign     Days of Exercise per Week: 0 days     Minutes of Exercise per Session: 0 min   Stress: Stress Concern Present (12/3/2024)    [place] citizen [place] [employer] - Occupational Stress Questionnaire     Feeling of Stress : To some extent   Housing Stability: Unknown (12/3/2024)    Housing Stability Vital Sign     Unable to Pay for Housing in the Last Year: No     Homeless in the Last Year: Not on file   Depression: High Risk (12/10/2024)    Depression     Last PHQ-4: Flowsheet Data: 12   Utilities: Not At Risk (12/3/2024)    [facility] Utilities     Threatened with loss of utilities: No   Health Literacy: Adequate Health Literacy (12/3/2024)     Health Literacy     Frequency of need for help with medical instructions: Rarely   Social Isolation: Not on file            OBJECTIVE:     Physical Exam:  Vitals: Temp: 98 °F (36.7 °C) (12/10/24 1459)  Pulse: 91 (12/10/24 1459)  Resp: 16 (12/10/24 1459)  BP: 116/75 (12/10/24 1459)  SpO2: 98 % (12/10/24 1459)  Physical Exam  Constitutional:       Appearance: She is underweight.   HENT:      Head: Normocephalic.      Mouth/Throat:      Mouth: Mucous membranes are moist.   Pulmonary:      Effort: Pulmonary effort is normal. No respiratory distress.   Abdominal:      General: There is distension.      Palpations: Abdomen is soft.      Tenderness: There is abdominal tenderness.    Musculoskeletal:         General: Normal range of motion.      Cervical back: Normal range of motion.      Right lower leg: No edema.      Left lower leg: No edema.   Skin:     General: Skin is warm and dry.      Coloration: Skin is pale.   Neurological:      Mental Status: She is alert and oriented to person, place, and time.   Psychiatric:         Mood and Affect: Mood normal.           Review of Symptoms      Symptom Assessment (ESAS 0-10 Scale)  Pain:  0  Dyspnea:  1  Anxiety:  2  Nausea:  0  Depression:  2  Anorexia:  3  Fatigue:  4  Insomnia:  4  Restlessness:  3  Agitation:  3       Anxiety:  Is nervous/anxious    ECOG Performance Status ndGndrndanddndend:nd nd2nd Psychosocial/Cultural:   See Palliative Psychosocial Note: Yes  **Primary  to Follow**  Palliative Care  Consult: No      Advance Care Planning   Advance Directives:   Living Will: No    LaPOST: No    Do Not Resuscitate Status: No    Medical Power of : No      Decision Making:  Patient answered questions  Goals of Care: The patient endorses that what is most important right now is to focus on symptom/pain control and curative/life-prolongation (regardless of treatment burdens)    Accordingly, we have decided that the best plan to meet the patient's goals includes continuing with treatment          Medications:    Current Outpatient Medications:     acetaminophen (TYLENOL) 500 MG tablet, Take 1,000 mg by mouth every 6 to 8 hours as needed (pain). , Disp: , Rfl:     apixaban (ELIQUIS) 5 mg Tab, Take 2 tablets oral twice a day for 7 days and then 1 tablet twice a day after that, Disp: 84 tablet, Rfl: 5    atorvastatin (LIPITOR) 20 MG tablet, TAKE 1 TABLET AT BEDTIME, Disp: 90 tablet, Rfl: 2    azilsartan med-chlorthalidone (EDARBYCLOR) 40-25 mg Tab, Take 1 tablet by mouth nightly., Disp: , Rfl:     cetirizine (ZYRTEC) 10 MG tablet, TAKE 1 TABLET DAILY, Disp: 90 tablet, Rfl: 1    cholecalciferol, vitamin D3, (VITAMIN D3) 50 mcg  (2,000 unit) Cap capsule, , Disp: , Rfl:     docusate sodium (COLACE) 100 MG capsule, Take 200 mg by mouth 2 (two) times daily., Disp: , Rfl:     EFFER-K disintegrating tablet, Take 20 mEq by mouth., Disp: , Rfl:     gabapentin (NEURONTIN) 250 mg/5 mL solution, Take 300 mg by mouth., Disp: , Rfl:     hyoscyamine 0.125 mg Subl, , Disp: , Rfl:     lactulose (CHRONULAC) 10 gram/15 mL solution, Take by mouth 3 (three) times daily., Disp: , Rfl:     LIDOcaine (LIDODERM) 5 %, , Disp: , Rfl:     LIDOcaine-prilocaine (EMLA) cream, , Disp: , Rfl:     LINZESS 72 mcg Cap capsule, TAKE 1 CAPSULE BEFORE BREAKFAST, Disp: 30 capsule, Rfl: 11    ondansetron (ZOFRAN-ODT) 4 MG TbDL, , Disp: , Rfl:     pantoprazole (PROTONIX) 40 MG tablet, TAKE ONE TABLET BY MOUTH TWICE DAILY BEFORE breakfast and dinner, Disp: , Rfl:     promethazine (PHENERGAN) 6.25 mg/5 mL syrup, , Disp: , Rfl:     sucralfate (CARAFATE) 100 mg/mL suspension, Take 1 g by mouth 4 (four) times daily., Disp: , Rfl:     benzonatate (TESSALON) 200 MG capsule, , Disp: , Rfl:     duke's soln (benadryl 30 mL, mylanta 30 mL, LIDOcaine 30 mL, nystatin 30 mL) 120mL, Take 10 mLs by mouth 4 (four) times daily., Disp: 500 mL, Rfl: 4    lenvatinib (LENVIMA) 14 mg/day(10 mg x 1-4 mg x 1) Cap, Take 14 mg by mouth once daily., Disp: 60 capsule, Rfl: 5    LORazepam (LORAZEPAM INTENSOL) 2 mg/mL Conc, , Disp: , Rfl:     methylphenidate HCl (RITALIN) 10 MG tablet, Take 1 tablet (10 mg total) by mouth once daily., Disp: 30 tablet, Rfl: 0    metoclopramide HCl (REGLAN) 10 MG tablet, Take 1 tablet (10 mg total) by mouth 3 (three) times daily before meals., Disp: 90 tablet, Rfl: 0    naloxegoL (MOVANTIK) 25 mg tablet, , Disp: , Rfl:     naloxone (NARCAN) 4 mg/actuation Spry, 4mg by nasal route as needed for opioid overdose; may repeat every 2-3 minutes in alternating nostrils until medical help arrives. Call 911, Disp: 1 each, Rfl: 11    oxyCODONE (ROXICODONE) 5 MG immediate release tablet,  Take 1 tablet (5 mg total) by mouth every 4 (four) hours as needed for Pain (breakthrough)., Disp: 90 tablet, Rfl: 0    senna (SENOKOT) 8.6 mg tablet, Take 2 tablets by mouth once daily., Disp: 60 tablet, Rfl: 0    XTAMPZA ER 18 mg CSpT, Take 1 capsule (18 mg total) by mouth 2 (two) times daily., Disp: 60 capsule, Rfl: 0  No current facility-administered medications for this visit.    Facility-Administered Medications Ordered in Other Visits:     0.9%  NaCl infusion, , Intravenous, Continuous, Tonia Woods NP, Last Rate: 70 mL/hr at 06/29/20 0603, New Bag at 06/29/20 0603    mupirocin 2 % ointment, , Nasal, On Call Procedure, Tonia Woods NP, Given at 06/29/20 0603    Labs:  CBC:   WBC   Date Value Ref Range Status   12/06/2024 3.27 (L) 3.90 - 12.70 K/uL Final     Hemoglobin   Date Value Ref Range Status   12/06/2024 8.0 (L) 12.0 - 16.0 g/dL Final     Hematocrit   Date Value Ref Range Status   12/06/2024 25.3 (L) 37.0 - 48.5 % Final     MCV   Date Value Ref Range Status   12/06/2024 95 82 - 98 fL Final     Platelets   Date Value Ref Range Status   12/06/2024 173 150 - 450 K/uL Final       LFT:   Lab Results   Component Value Date    AST 22 12/06/2024    ALKPHOS 631 (H) 12/06/2024    BILITOT 1.6 (H) 12/06/2024       Albumin:   Albumin   Date Value Ref Range Status   12/06/2024 3.2 (L) 3.5 - 5.2 g/dL Final     Protein:   Total Protein   Date Value Ref Range Status   12/06/2024 6.7 6.0 - 8.4 g/dL Final           60 minutes of total time spent on the encounter, which includes face to face time and non-face to face time preparing to see the patient (eg, review of tests), Obtaining and/or reviewing separately obtained history, Documenting clinical information in the electronic or other health record, Independently interpreting results if documented above (not separately reported) and communicating results to the patient/family/caregiver, or Care coordination (not separately reported).    20 minutes spent  in discussing ACP    Signature: Chica Mejía NP

## 2024-12-10 NOTE — PROGRESS NOTES
Subjective     Patient ID: Felicia Ruffin is a 59 y.o. female.    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   Reviewed immunohistochemistry results from [facility]: CLDN18 negative, ERBB2 equivocal at 2+.  Microsatellite stable/ PDL1 negative CPS 0        Pharmacogenomic profile (11/1/2024): DPD normal. Significant UGT1A1  -significant weight loss that she had initially attributed to Ozempic but more recently has had solid food dysphagia and anemia  -10/18/2022: EGD: stomach cardia biopsies: invasive mod diff adeno  -10/26/2022: CT CAP: Faint density in the right lung would recommend follow-up studies. No evidence of metastatic disease a localized spread.  -11/2/2022: PET/CT concerning for potential carcinomatosis with several pet avid abdominal soft tissue nodules.  -11/8/2022: diagnostic laparoscopy omentectomy: metastatic mod diff adeno . gross peritoneal disease   -11/16/2022: C1D1 FOLFOX.  She received 9 cycles of the FOLFOX at full dose of oxaliplatin.  On 04/19/2023 cycle 10 FOLFOX oxaliplatin was dose reduced to 65 mg/m2  On 05/17/2023 she received 5 FU alone with no oxaliplatin and started maintenance 5 FU after that     In June 20, 2023 she went to see MD Acevedo to discuss HIPEC but was not recommend.  She returned back to [facility] and continued her 5 FU maintenance chemotherapy  In August of 2023:  EGD showed known severe reflux esophagitis with no bleeding biopsied, there was large amount of food residue in the stomach, but no residual tumor identified although the presence of food could have interfered with visualization     9/7/2023:  She had gastric emptying  study which showed 79% retention of food after 90 minutes.  She received cycle 19 of maintenance 5 FU as of 9/27/2023     CT scans 10/9/2023:  Following an [facility] visit with the recommendation to stop FOLFOX secondary to disease progression with new hepatic and peritoneal disease and new left gastric adenopathy.  [facility] recommendation included clinical trial with Keytruda and lenvatinib versus FOLFIRI     After long discussion she decided to proceed with FOLFIRI.     Cycle 1 of FOLFIRI was on 10/25/2023     Repeat EGD 12/24/2023 with decrease in size of the tumor and nonobstructing lesion in the gastric body.  Pathology showed gastric adenocarcinoma     12/08/2023:  MRI abdomen with and without contrast at [facility] shows no definitive enhancing lesion of the gastric body no adjacent lymph nodes and no evidence of metastatic disease in the liver.     12/11/2023 CT chest abdomen pelvis also at [facility]:  Stable irregularly marginated right lower lobe nodule 8 mm not significantly changed, stable hepatic metastasis.  Multiple peritoneal implants not significantly changed, some more confluent and marginally larger.  Left gastric peripancreatic lymphadenopathy not significantly changed.  New 0.6 cm hypoattenuating lesion in segment 4 of the liver.  Multifocal omental implants with 1 particular omental implant now measuring 3 x 1 cm compared to 2 x 1 cm     Between 12/13/2023 through 02/07/2024 she received cycle 5 through cycle 9 of chemotherapy with FOLFIRI     On 02/21/2024 she received 5 FU only and irinotecan was omitted     On 03/04/2024 she underwent EGD for Botox.  She was noted to have a GE junction stricture with stenosis and biopsies were taken and Botox was injected     On 03/14/2024 she had a CT chest abdomen pelvis and that showed a slight increase in the size of the right lower lobe compared to prior (10 mm from 8 mm) stable metastatic disease in the liver, interval dislodgment  of the esophageal stent with distal migration into the stomach.  Malignant implant in the right anterior abdominal wall and omental and mesenteric metastatic implants as well as lymph nodes, left lower quadrant omental lymph nodes and implants measuring 11 mm, 9.5 mm, 12 mm, with mild bilateral hydronephrosis.  Omental lesion 3 cm prior 2 cm.  Right rectus muscle anterior focal thickening measuring 2.3 cm likely a malignant implant.     PEG Tube placed in 3/19/2024     On 04/05/2024 she underwent PET scan:  Revealed severe thickening along the gastroesophageal junction, gastric cardia with radiotracer activity consistent with malignancy significantly worsened in the interval.  Multiple new metastatic liver lesions, moderately enlarged peritoneal omental implants.  Right lower lobe nodule slightly larger, and right upper intramuscular rectus abdominal mass consistent with malignancy with tract extending to the skin surface.  Bilateral severe hydronephrosis     On 04/24/2024 she was started on paclitaxel plus ramcirumab.  She received cycle 2 on 05/22/2024.  Her CEA was 157.9.   She received cycle 3 on 06/19/2024, CEA on that day was 56.1     Repeat PET scan 07/15/2024, revealed complete positive response to the therapy with near complete resolution of uptake within the liver lesions and the stomach.  CEA was 49.2     On 07/31/2024 she received cycle 4 of paclitaxel ramcirumab.  Repeat CEA on 08/07/2024 86.4     On 08/28/2024 she received cycle 5 of paclitaxel ramcirumab with CEA of 177.2     She then started Lonsurf on 09/21/2024  She saw Dr. Zhou at [facility] on 10/07/2024 and was recommended a phase 1 clinical trial           All of her treatments so far have been at [facility] with consults at [facility]       CT CAP W contrast at [facility] on 10/06/2024 (compared to scans at [facility] on 12/11/2023)  1. Interval worsening of pulmonary and hepatic metastasis. 2. Interval worsening of carcinomatosis  "in the abdomen and development of trace fluid in the abdomen. 3. New mass lesion involving the right rectus musculature. ACTIONABLE ITEMS/RECOMMENDATIONS*: See impression *An Actionable Finding is a finding that may be unrelated to the original reason for imaging but potentially actionable, meaning further investigation may be necessary. The Actionable Findings Vigilance Unit (AFVU) assists medical providers with responding to additional radiologic findings that are unexpected and potentially actionable."      Discussed with Dr. Que Zhou at [facility], who notes that they they have not sent a claudin 18 at [facility] but she has peritoneal biopsy specimen and she will send that.  We discussed continuing Lonsurf unless it stops working.  We discussed Keytruda plus lenvatinib off-label use based on phase 2 data from [place].  If insurance does not approve the combination then we could try to obtain Keytruda on a compassionate use.    She came back as negative fopr CLAUDIN 18    [facility] which reveal "Interval worsening hepatic metastases,   2. Slight interval increase in size of several pulmonary nodules and stable to slight interval increase in size of the dominant spiculated nodular opacity in the right lower lobe, concerning for metastases.  3. Interval development of a 3.0 cm splenic mass concerning for metastasis.  4. Persistent abnormal soft tissue stranding and nodularity of the omentum concerning for peritoneal carcinomatosis.  5. New focal nonocclusive pulmonary embolus within the lateral basal segmental artery to the left lower lobe.  6. Small scattered trace peritoneal ascites, decreased in volume compared to prior exam; however, there is been interval increase in free pelvic fluid.  Mild body wall anasarca.  7. Enlarged peripancreatic lymph node, increased compared to the prior exam"    Since she is progressing on Lonsurf we will go " ahead and obtain authorization for lenvatinib and Keytruda     Keytruda orders were placed and lenvatinib we will be sent to [facility],      Also reached out to Dr. Que Zhou at [facility] to review above plan and verify lenvatinib dosing    Also started her on Eliquis      Review of Systems   Constitutional:  Negative for appetite change and unexpected weight change.   HENT:  Positive for mouth sores.    Eyes:  Negative for visual disturbance.   Respiratory:  Negative for cough and shortness of breath.    Cardiovascular:  Negative for chest pain.   Gastrointestinal:  Positive for abdominal pain. Negative for diarrhea.   Genitourinary:  Negative for frequency.   Musculoskeletal:  Negative for back pain.   Integumentary:  Negative for rash.   Neurological:  Negative for headaches.   Hematological:  Negative for adenopathy.   Psychiatric/Behavioral:  The patient is nervous/anxious.           Objective     Physical Exam  Vitals reviewed.   Constitutional:       Appearance: She is well-developed.   HENT:      Mouth/Throat:      Pharynx: No oropharyngeal exudate.      Comments: Sore on the left side of tongue    Cardiovascular:      Rate and Rhythm: Normal rate.      Heart sounds: Normal heart sounds.   Pulmonary:      Effort: Pulmonary effort is normal.      Breath sounds: Normal breath sounds. No wheezing.   Abdominal:      General: Bowel sounds are normal. There is distension.      Palpations: Abdomen is soft. There is no shifting dullness.      Tenderness: There is no abdominal tenderness.   Musculoskeletal:         General: No tenderness.   Lymphadenopathy:      Cervical: No cervical adenopathy.   Skin:     General: Skin is warm and dry.      Findings: No rash.   Neurological:      Mental Status: She is alert and oriented to person, place, and time.      Coordination: Coordination normal.   Psychiatric:         Thought Content: Thought content normal.         Judgment: Judgment normal.               LABS:  WBC   Date Value Ref Range Status   12/06/2024 3.27 (L) 3.90 - 12.70 K/uL Final     Hemoglobin   Date Value Ref Range Status   12/06/2024 8.0 (L) 12.0 - 16.0 g/dL Final     Hematocrit   Date Value Ref Range Status   12/06/2024 25.3 (L) 37.0 - 48.5 % Final     Platelets   Date Value Ref Range Status   12/06/2024 173 150 - 450 K/uL Final     Gran # (ANC)   Date Value Ref Range Status   12/06/2024 1.8 1.8 - 7.7 K/uL Final     Gran %   Date Value Ref Range Status   12/06/2024 54.8 38.0 - 73.0 % Final       Chemistry        Component Value Date/Time     12/06/2024 0929    K 3.3 (L) 12/06/2024 0929     12/06/2024 0929    CO2 26 12/06/2024 0929    BUN 16 12/06/2024 0929    CREATININE 0.6 12/06/2024 0929     (H) 12/06/2024 0929        Component Value Date/Time    CALCIUM 9.0 12/06/2024 0929    ALKPHOS 631 (H) 12/06/2024 0929    AST 22 12/06/2024 0929    ALT 23 12/06/2024 0929    BILITOT 1.6 (H) 12/06/2024 0929    ESTGFRAFRICA >60 05/20/2022 0752    EGFRNONAA >60 05/20/2022 0752          Assessment and Plan     1. Gastric adenocarcinoma  -     Urinalysis; Future; Expected date: 12/10/2024  -     lenvatinib (LENVIMA) 14 mg/day(10 mg x 1-4 mg x 1) Cap; Take 14 mg by mouth once daily.  Dispense: 60 capsule; Refill: 5  -     CBC w/ DIFF; Future; Expected date: 12/10/2024  -     CMP; Future; Expected date: 12/10/2024  -     Urinalysis; Future; Expected date: 12/10/2024    2. Secondary liver cancer    3. Malignant neoplasm metastatic to both lungs    4. 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 Hypothyroidism, unspecified type  -     TSH; Future; Expected date: 12/10/2024  -     FREE T4; Future; Expected date: 12/10/2024  -     TSH; Future; Expected date: 12/10/2024  -     FREE T4; Future; Expected date: 12/10/2024    7. Oral mucositis  -     duke's soln (benadryl 30 mL, mylanta 30 mL, LIDOcaine 30 mL, nystatin 30 mL) 120mL; Take 10 mLs by  mouth 4 (four) times daily.  Dispense: 500 mL; Refill: 4    8. Neoplasm related pain        Route Chart for Scheduling    Med Onc Chart Routing      Follow up with physician . Sent to RN to schedule   Follow up with [facility]    Infusion scheduling note    Injection scheduling note    Labs    Imaging    Pharmacy appointment    Other referrals                  Treatment Plan Information   OP LENVATINIB 20 MG DAILY + PEMBROLIZUMAB 200 MG Q3W Meenakshi Pedroza MD   Associated diagnosis: Gastric adenocarcinoma Stage IVB cT4, cN2, cM1 noted on 7/5/2023  Associated diagnosis: Secondary liver cancer   noted on 11/1/2024  Associated diagnosis: Secondary lung cancer   noted on 11/1/2024   Line of treatment: Second Line  Treatment Goal: Palliative     Upcoming Treatment Dates - OP LENVATINIB 20 MG DAILY + PEMBROLIZUMAB 200 MG Q3W    12/10/2024       Antiemetics       Physician communication order       Take Home Chemotherapy       lenvatinib (LENVIMA) 20 mg/day (10 mg x 2) Cap  12/11/2024       Chemotherapy       pembrolizumab (KEYTRUDA) 200 mg in 0.9% NaCl SolP 108 mL infusion  1/1/2025       Chemotherapy       pembrolizumab (KEYTRUDA) 200 mg in 0.9% NaCl SolP 108 mL infusion  1/22/2025       Chemotherapy       pembrolizumab (KEYTRUDA) 200 mg in 0.9% NaCl SolP 108 mL infusion    Therapy Plan Information  FERAHEME (FERUMOXYTOL) TWO DOSES for Iron deficiency anemia, unspecified, noted on 7/9/2020  Medications  ferumoxytoL (FERAHEME) 510 mg in dextrose 5 % 100 mL IVPB  510 mg, Intravenous, Every visit  Flushes  heparin, porcine (PF) 100 unit/mL injection flush 500 Units  500 Units, Intravenous, PRN  sodium chloride 0.9% flush 10 mL  10 mL, Intravenous, Every visit  sodium chloride 0.9% 100 mL flush bag  Intravenous, Every visit  PRN Medications  EPINEPHrine (EPIPEN) 0.3 mg/0.3 mL pen injection 0.3 mg  0.3 mg, Intramuscular, PRN  diphenhydrAMINE injection 50 mg  50 mg, Intravenous, PRN  methylPREDNISolone sodium succinate injection 125  mg  125 mg, Intravenous, PRN  sodium chloride 0.9% bolus 1,000 mL  1,000 mL, Intravenous, PRN    PORT FLUSH for Gastric adenocarcinoma, noted on 7/5/2023  Flushes  heparin, porcine (PF) 100 unit/mL injection flush 500 Units  500 Units, Intravenous, Every visit  sodium chloride 0.9% flush 10 mL  10 mL, Intravenous, Every visit      No therapy plan of the specified type found.           Mrs. Ruffin comes in review her CT scans which reveal disease progression on Lonsurf.  I reviewed with Dr. Que Zhou at [facility] and is to proceed with lenvatinib and Keytruda combination.  Her Keytruda has been approved and we waiting on lenvatinib authorization  She will let me know when she receives the chemo medication at home and we schedule for Keytruda    Side effects of both lenvatinib and Keytruda were discussed and consents were signed    She needs a UA TSH and free T4 when she starts the Keytruda.  Does not need to see me for cycle 1  I will see her for cycle 2 with CBC CMP and Keytruda  She is already seeing palliative Care for cancer pain issues    Visit today included increased complexity associated with the care of the episodic problem chemo/immunotherapy addressed and managing the longitudinal care of the patient due to the serious and/or complex managed problem(s) gastric cancer    Above care plan was discussed with patient and all questions were addressed to her satisfaction

## 2024-12-10 NOTE — PROGRESS NOTES
PSYCHO-ONCOLOGY INTAKE    Diagnostic Interview - CPT 07842    Date: 12/10/2024  Site: [place], [place]    Evaluation Length (direct face-to-face time):  1 hour and 20 minutes     This includes face to face time and non-face to face time preparing to see the patient, obtaining and/or reviewing separately obtained history, documenting clinical information in the electronic or other health record, independently interpreting results and communicating results to the patient/family/caregiver, or care coordinator.     Referral Source: Mayelin Lopez FNP-C   Oncologist:   PCP: Zachary Barksdale MD    Clinical status of patient: Outpatient    Felicia Ruffin, a 59 y.o. female, seen for initial evaluation visit.    Felicia Ruffin reviewed and agreed to informed consent and the limits of confidentiality.    Chief complaint/reason for encounter: adjustment to illness, depression, anxiety    History of Present Illness:     Medical/Surgical History:    Patient Active Problem List   Diagnosis    HTN (hypertension)    Asthma    Pre-diabetes    Kidney stone    Chronic lumbar pain    Hyperlipemia    Anal fissure    Iron deficiency anemia, unspecified    OB + stool    Dysphagia    Gastric adenocarcinoma    Chemotherapy-induced peripheral neuropathy    Chemotherapy-induced fatigue    Insomnia    Achalasia    Secondary malignant neoplasm of parietal peritoneum    Secondary liver cancer    Secondary lung cancer    Other low back pain    Neoplastic malignant related fatigue    Anxiety about health    Immunodeficiency due to chemotherapy    Neoplasm related pain       Health Behaviors:       ETOH Use: Pt denied      Tobacco Use: Pt denied   Illicit Drug Use:  Pt denied      Prescription Misuse: Pt denied    Caffeine: minimal    Exercise:The patient engages in little, if any physical activity.   Firearms:  Yes, they are not loaded and the ammunition is stored separately from the firearms    Advanced directives: Yes     Family  History:   Psychiatric illness: Son - Asperberger's and ADHD     Alcohol/Drug Abuse: Dad - alcohol abuse     Suicide: Pt denied      Past Psychiatric History:   Inpatient treatment: Pt denied     Outpatient treatment: Per chart review, pt engaged in brief couples counseling in 2007, saw Dr. Colon in 2022 and Dr. Brown in 2023.    Prior substance abuse treatment: Pt denied     Suicide Attempts: Pt denied    Psychotropic Medications:        Past: Ativan and Lexapro     Current medications as per below, allergies reviewed in chart.    Current Outpatient Medications   Medication    acetaminophen (TYLENOL) 500 MG tablet    apixaban (ELIQUIS) 5 mg Tab    atorvastatin (LIPITOR) 20 MG tablet    azilsartan med-chlorthalidone (EDARBYCLOR) 40-25 mg Tab    benzonatate (TESSALON) 200 MG capsule    cetirizine (ZYRTEC) 10 MG tablet    cholecalciferol, vitamin D3, (VITAMIN D3) 50 mcg (2,000 unit) Cap capsule    docusate sodium (COLACE) 100 MG capsule    duke's soln (benadryl 30 mL, mylanta 30 mL, LIDOcaine 30 mL, nystatin 30 mL) 120mL    EFFER-K disintegrating tablet    gabapentin (NEURONTIN) 250 mg/5 mL solution    hyoscyamine 0.125 mg Subl    lactulose (CHRONULAC) 10 gram/15 mL solution    lenvatinib (LENVIMA) 14 mg/day(10 mg x 1-4 mg x 1) Cap    LIDOcaine (LIDODERM) 5 %    LIDOcaine-prilocaine (EMLA) cream    LINZESS 72 mcg Cap capsule    LORazepam (LORAZEPAM INTENSOL) 2 mg/mL Conc    methylphenidate HCl (RITALIN) 10 MG tablet    metoclopramide HCl (REGLAN) 10 MG tablet    naloxegoL (MOVANTIK) 25 mg tablet    naloxone (NARCAN) 4 mg/actuation Spry    ondansetron (ZOFRAN-ODT) 4 MG TbDL    oxyCODONE (ROXICODONE) 5 MG immediate release tablet    pantoprazole (PROTONIX) 40 MG tablet    promethazine (PHENERGAN) 6.25 mg/5 mL syrup    senna (SENOKOT) 8.6 mg tablet    sucralfate (CARAFATE) 100 mg/mL suspension    XTAMPZA ER 18 mg CSpT     No current facility-administered medications for this visit.     Facility-Administered Medications  Ordered in Other Visits   Medication Frequency    0.9%  NaCl infusion Continuous    mupirocin 2 % ointment On Call Procedure        Social situation/Stressors: Felicia Ruffin lives with her  in [place].  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  She has been in her job for almost 40 years.    Felicia Ruffin has been  19 years and has 1 adult son (19 years old).  Her partner is Hilario. Her son is currently a freshman at \[employer]\"" living in the dorms. The patient reports adequate social support.  Felicia Ruffin is an inactive member of the [employer].       Strengths:Steady employment, Housing stability, Able to vocalize needs, and Setting and pursuing goals, hopes, dreams, aspirations  Liabilities: Complicated medical illness and Lack of social supports    Current Evaluation:     Mental Status Exam: Felicia Ruffin arrived  promptly for the assessment session.  The patient was fully cooperative throughout the interview and was an adequate historian   Appearance: age appropriate, appropriately  dressed, adequately  groomed  Behavior/Cooperation: friendly and cooperative  Speech: normal in rate, volume, and tone and appropriate quality, quantity and organization of sentences  Mood: steady  Affect: mood congruent and appropriate  Thought Process: goal-directed, logical  Thought Content: normal, no suicidality, no homicidality, delusions, or paranoia;did not appear to be responding to internal stimuli during the interview.   Orientation: grossly intact  Memory: grossly intact  Attention Span/Concentration: Attends to interview without distraction; reports subjective difficulty  Fund of Knowledge: average  Estimate of Intelligence: average from verbal skills and history  Cognition: grossly intact  Insight: patient has awareness of illness; good insight into own behavior and behavior of others  Judgment: the patient's behavior is adequate to circumstances      Adjustment Assessment to  "Current Illness:    Oncology History   Gastric adenocarcinoma   7/5/2023 Initial Diagnosis    Gastric adenocarcinoma     11/1/2024 Cancer Staged    Staging form: Stomach, AJCC 8th Edition  - Clinical: Stage IVB (cT4, cN2, cM1)     12/11/2024 -  Chemotherapy    Treatment Summary   Plan Name: OP LENVATINIB 20 MG DAILY + PEMBROLIZUMAB 200 MG Q3W  Treatment Goal: Palliative  Status: Active  Start Date: 12/11/2024 (Planned)  End Date: 11/25/2026 (Planned)  Provider: Meenakshi Pedroza MD  Chemotherapy: lenvatinib (LENVIMA) 20 mg/day (10 mg x 2) Cap, 20 mg, Oral, Daily, 0 of 1 cycle, Start date: --, End date: --     Secondary liver cancer   11/1/2024 Initial Diagnosis    Secondary liver cancer     12/11/2024 -  Chemotherapy    Treatment Summary   Plan Name: OP LENVATINIB 20 MG DAILY + PEMBROLIZUMAB 200 MG Q3W  Treatment Goal: Palliative  Status: Active  Start Date: 12/11/2024 (Planned)  End Date: 11/25/2026 (Planned)  Provider: Meenakshi Pedroza MD  Chemotherapy: lenvatinib (LENVIMA) 20 mg/day (10 mg x 2) Cap, 20 mg, Oral, Daily, 0 of 1 cycle, Start date: --, End date: --     Secondary lung cancer   11/1/2024 Initial Diagnosis    Secondary lung cancer     12/11/2024 -  Chemotherapy    Treatment Summary   Plan Name: OP LENVATINIB 20 MG DAILY + PEMBROLIZUMAB 200 MG Q3W  Treatment Goal: Palliative  Status: Active  Start Date: 12/11/2024 (Planned)  End Date: 11/25/2026 (Planned)  Provider: Meenakshi Pedroza MD  Chemotherapy: lenvatinib (LENVIMA) 20 mg/day (10 mg x 2) Cap, 20 mg, Oral, Daily, 0 of 1 cycle, Start date: --, End date: --           Felicia Ruffin has adjusted to illness with moderate difficulty primarily through passive coping strategies, focus on alternative activities, and focus on family. Pt noted that her cancer is "uncurable" but hopes that treatment could assist in prolonging her life. She has engaged in appropriate information gathering.  The patient has adequate family/friend support; however, feels that her  " "does not understand the complexity of her diagnosis.  Her support system is coping marginally with the diagnosis/treatment/prognosis. Illness-related psychosocial stressors include difficulty meeting family responsibilities, changes in ability to engage in leisure activities, and absence from home.  The patient has a fair partnership with her [facility] treatment team. The patient reports the following barriers to cancer care:lack of family support.     [facility]:       12/3/2024     9:14 AM 11/14/2024     8:32 AM 11/12/2024     3:44 AM 10/31/2024     8:14 AM 1/4/2023     8:40 AM   DISTRESS SCREENING   Distress Score 4  3 4  0 - No Distress 7   Practical Concerns Taking care of myself;Transportation  Finances Taking care of myself;Work;Finances  None of these    Social Concerns Relationship with spouse or partner  Relationship with spouse or partner None of these  None of these    Emotional Concerns Worry or anxiety;Sadness or depression;Fear  Worry or anxiety;Sadness or depression Worry or anxiety;Sadness or depression;Fear;Anger  None of these    Spiritual or Baptism Concerns Ritual or dietary needs  None of these Sense of meaning or purpose;Death, dying, or afterlife  None of these    Physical Concerns Pain;Fatigue  Sleep;Fatigue;Pain Pain;Sleep;Fatigue  None of these    Other Problems Concerns with cancer trials or going natural   Feeding tube concerns          Patient-reported        Symptoms:   Mood: depressed mood, diminished interest, weight loss, insomnia, psychomotor retardation, fatigue, worthlessness/guilt, poor concentration, and thoughts of death (related to prognosis);  prior depression:situational throughout adulthood ; Pt denied SI and HI citing that she "wants to fight to live"     Anxiety: Feeling nervous, anxious, or on edge, Uncontrollable worry (about her prognosis), Excessive worry (interfering with interpersonal functioning), Difficulty relaxing, Restlessness, " "Irritability, Fear of unknown,prior anxiety:situational throughout adulthood    Substance abuse: denied  Cognitive functioning:  Subjective report of difficulty concentrating   Sleep: Experiences a "burst of energy" at night making it challenging to fall asleep. Will experience disrupted sleep due to needing the bathroom.        12/11/2024   PHQ-9 Depression Patient Health Questionnaire   Over the last two weeks how often have you been bothered by little interest or pleasure in doing things 1   Over the last two weeks how often have you been bothered by feeling down, depressed or hopeless 2   Over the last two weeks how often have you been bothered by trouble falling or staying asleep, or sleeping too much 2   Over the last two weeks how often have you been bothered by feeling tired or having little energy 3   Over the last two weeks how often have you been bothered by a poor appetite or overeating 2   Over the last two weeks how often have you been bothered by feeling bad about yourself - or that you are a failure or have let yourself or your family down 2   Over the last two weeks how often have you been bothered by trouble concentrating on things, such as reading the newspaper or watching television 2   Over the last two weeks how often have you been bothered by moving or speaking so slowly that other people could have noticed. 2   Over the last two weeks how often have you been bothered by thoughts that you would be better off dead, or of hurting yourself 1   PHQ-9 Score 17            5/9/2024    12:52 PM 6/6/2024    10:56 AM 12/11/2024    11:42 AM   JACQUELINE-7   Was test performed?  Yes Yes   1. Feeling nervous, anxious, or on edge? Several days  More than half the days Several days   2. Not being able to stop or control worrying? Several days  More than half the days More than half the days   3. Worrying too much about different things? Several days  More than half the days More than half the days   4. Trouble " relaxing? Several days  More than half the days More than half the days   5. Being so restless that it is hard to sit still? Several days  More than half the days More than half the days   6. Becoming easily annoyed or irritable? Several days  More than half the days More than half the days   7. Feeling afraid as if something awful might happen? Several days  Nearly everyday More than half the days   JACQUELINE-7 Score 7 15 13   Number answered (out of first 7) 7 7 7   Interpretation Mild Anxiety Severe Anxiety Moderate Anxiety       Patient-reported       Assessment - Diagnosis - Goals:       ICD-10-CM ICD-9-CM   1. Depression due to physical illness  F06.31 293.83   2. Generalized anxiety disorder  F41.1 300.02   3. Gastric adenocarcinoma  C16.9 151.9       Plan:individual psychotherapy    Summary and Recommendations  Felicia Ruffin is a 59 y.o. female referred by Mayelin Lopez FNP-C for psychological evaluation and treatment.  Ms. Ruffin appears to be having difficulty coping with her reoccurrence and interpersonal communication regarding her prognosis. She is interested in individual supportive therapy to assist with processing emotions related to her diagnosis and prognosis.     Return to clinic: 3 weeks    GOALS:   Process and cope with emotions related to diagnosis   Improve interpersonal functioning between pt and family     Jen Acevedo Psy.D.   Clinical Health Psychology Fellow

## 2024-12-11 ENCOUNTER — TELEPHONE (OUTPATIENT)
Dept: HEMATOLOGY/ONCOLOGY | Facility: CLINIC | Age: 59
End: 2024-12-11
Payer: OTHER GOVERNMENT

## 2024-12-11 DIAGNOSIS — C16.9 GASTRIC ADENOCARCINOMA: Primary | ICD-10-CM

## 2024-12-11 NOTE — TELEPHONE ENCOUNTER
Spoke with patient explained to her dr eduardo spoke with pharmacy---and she may have mushrooms while taking lenvima. She thanked nurse.

## 2024-12-12 ENCOUNTER — HOSPITAL ENCOUNTER (OUTPATIENT)
Dept: CARDIOLOGY | Facility: HOSPITAL | Age: 59
Discharge: HOME | End: 2024-12-12
Attending: INTERNAL MEDICINE
Payer: OTHER GOVERNMENT

## 2024-12-12 DIAGNOSIS — C16.9 GASTRIC ADENOCARCINOMA: ICD-10-CM

## 2024-12-12 LAB
OHS QRS DURATION: 78 MS
OHS QTC CALCULATION: 445 MS

## 2024-12-12 PROCEDURE — 93010 ELECTROCARDIOGRAM REPORT: CPT | Mod: ,,, | Performed by: INTERNAL MEDICINE

## 2024-12-12 PROCEDURE — 93005 ELECTROCARDIOGRAM TRACING: CPT | Mod: PO

## 2024-12-13 ENCOUNTER — PATIENT MESSAGE (OUTPATIENT)
Dept: HEMATOLOGY/ONCOLOGY | Facility: CLINIC | Age: 59
End: 2024-12-13
Payer: OTHER GOVERNMENT

## 2024-12-13 NOTE — TELEPHONE ENCOUNTER
"Her treatment is all approved. You just need a date from Infusion this next week and she can get treated   Leia was working on it.     She does not need to see me for cycle 1 but needs labs    This is the message I sent to Leia  She will let us know once she receives lenvatinib, you can then schedule the Keytruda in infusion.  She needs a TSH free T4 and a UA done before she starts.  She is a difficult stick so the labs have to be drawn from the port.  I do not need to see her before first infusion   Three weeks after at schedule CBC CMP (draw from port) and see me and for Keytruda"        "

## 2024-12-16 DIAGNOSIS — C16.9 GASTRIC ADENOCARCINOMA: Primary | ICD-10-CM

## 2024-12-17 ENCOUNTER — INFUSION (OUTPATIENT)
Dept: INFUSION THERAPY | Facility: HOSPITAL | Age: 59
End: 2024-12-17
Attending: NURSE PRACTITIONER
Payer: OTHER GOVERNMENT

## 2024-12-17 VITALS
BODY MASS INDEX: 17.48 KG/M2 | DIASTOLIC BLOOD PRESSURE: 70 MMHG | HEART RATE: 76 BPM | RESPIRATION RATE: 17 BRPM | OXYGEN SATURATION: 99 % | WEIGHT: 95 LBS | TEMPERATURE: 98 F | SYSTOLIC BLOOD PRESSURE: 125 MMHG | HEIGHT: 62 IN

## 2024-12-17 DIAGNOSIS — C78.7 SECONDARY LIVER CANCER: Primary | ICD-10-CM

## 2024-12-17 DIAGNOSIS — C78.02 MALIGNANT NEOPLASM METASTATIC TO BOTH LUNGS: ICD-10-CM

## 2024-12-17 DIAGNOSIS — C16.9 GASTRIC ADENOCARCINOMA: ICD-10-CM

## 2024-12-17 DIAGNOSIS — C78.01 MALIGNANT NEOPLASM METASTATIC TO BOTH LUNGS: ICD-10-CM

## 2024-12-17 DIAGNOSIS — E03.9 HYPOTHYROIDISM, UNSPECIFIED TYPE: ICD-10-CM

## 2024-12-17 LAB
ALBUMIN SERPL BCP-MCNC: 2.9 G/DL (ref 3.5–5.2)
ALP SERPL-CCNC: 447 U/L (ref 40–150)
ALT SERPL W/O P-5'-P-CCNC: 10 U/L (ref 10–44)
ANION GAP SERPL CALC-SCNC: 12 MMOL/L (ref 8–16)
AST SERPL-CCNC: 27 U/L (ref 10–40)
BILIRUB SERPL-MCNC: 1.2 MG/DL (ref 0.1–1)
BILIRUB UR QL STRIP: NEGATIVE
BUN SERPL-MCNC: 11 MG/DL (ref 6–20)
CALCIUM SERPL-MCNC: 8.7 MG/DL (ref 8.7–10.5)
CHLORIDE SERPL-SCNC: 103 MMOL/L (ref 95–110)
CLARITY UR: CLEAR
CO2 SERPL-SCNC: 21 MMOL/L (ref 23–29)
COLOR UR: YELLOW
CREAT SERPL-MCNC: 0.6 MG/DL (ref 0.5–1.4)
EST. GFR  (NO RACE VARIABLE): >60 ML/MIN/1.73 M^2
GLUCOSE SERPL-MCNC: 107 MG/DL (ref 70–110)
GLUCOSE UR QL STRIP: NEGATIVE
HGB UR QL STRIP: NEGATIVE
KETONES UR QL STRIP: NEGATIVE
LEUKOCYTE ESTERASE UR QL STRIP: NEGATIVE
NITRITE UR QL STRIP: NEGATIVE
PH UR STRIP: 6 [PH] (ref 5–8)
POTASSIUM SERPL-SCNC: 3.8 MMOL/L (ref 3.5–5.1)
PROT SERPL-MCNC: 6.3 G/DL (ref 6–8.4)
PROT UR QL STRIP: NEGATIVE
SODIUM SERPL-SCNC: 136 MMOL/L (ref 136–145)
SP GR UR STRIP: 1.02 (ref 1–1.03)
T4 FREE SERPL-MCNC: 1.04 NG/DL (ref 0.71–1.51)
TSH SERPL DL<=0.005 MIU/L-ACNC: 2.82 UIU/ML (ref 0.4–4)
URN SPEC COLLECT METH UR: NORMAL

## 2024-12-17 PROCEDURE — 84439 ASSAY OF FREE THYROXINE: CPT | Performed by: INTERNAL MEDICINE

## 2024-12-17 PROCEDURE — 81003 URINALYSIS AUTO W/O SCOPE: CPT | Mod: PN | Performed by: INTERNAL MEDICINE

## 2024-12-17 PROCEDURE — 84443 ASSAY THYROID STIM HORMONE: CPT | Performed by: INTERNAL MEDICINE

## 2024-12-17 PROCEDURE — 96413 CHEMO IV INFUSION 1 HR: CPT | Mod: PN

## 2024-12-17 PROCEDURE — 25000003 PHARM REV CODE 250: Mod: PN | Performed by: INTERNAL MEDICINE

## 2024-12-17 PROCEDURE — 63600175 PHARM REV CODE 636 W HCPCS: Mod: JZ,JG,PN | Performed by: INTERNAL MEDICINE

## 2024-12-17 PROCEDURE — A4216 STERILE WATER/SALINE, 10 ML: HCPCS | Mod: PN | Performed by: INTERNAL MEDICINE

## 2024-12-17 PROCEDURE — 80053 COMPREHEN METABOLIC PANEL: CPT | Mod: PN | Performed by: INTERNAL MEDICINE

## 2024-12-17 RX ORDER — EPINEPHRINE 0.3 MG/.3ML
0.3 INJECTION SUBCUTANEOUS ONCE AS NEEDED
Status: CANCELLED | OUTPATIENT
Start: 2024-12-17

## 2024-12-17 RX ORDER — HEPARIN 100 UNIT/ML
500 SYRINGE INTRAVENOUS
Status: CANCELLED | OUTPATIENT
Start: 2024-12-17

## 2024-12-17 RX ORDER — HEPARIN 100 UNIT/ML
500 SYRINGE INTRAVENOUS
Status: DISCONTINUED | OUTPATIENT
Start: 2024-12-17 | End: 2024-12-17 | Stop reason: HOSPADM

## 2024-12-17 RX ORDER — SODIUM CHLORIDE 0.9 % (FLUSH) 0.9 %
10 SYRINGE (ML) INJECTION
Status: CANCELLED | OUTPATIENT
Start: 2024-12-17

## 2024-12-17 RX ORDER — DIPHENHYDRAMINE HYDROCHLORIDE 50 MG/ML
50 INJECTION INTRAMUSCULAR; INTRAVENOUS ONCE AS NEEDED
Status: DISCONTINUED | OUTPATIENT
Start: 2024-12-17 | End: 2024-12-17 | Stop reason: HOSPADM

## 2024-12-17 RX ORDER — EPINEPHRINE 0.3 MG/.3ML
0.3 INJECTION SUBCUTANEOUS ONCE AS NEEDED
Status: DISCONTINUED | OUTPATIENT
Start: 2024-12-17 | End: 2024-12-17 | Stop reason: HOSPADM

## 2024-12-17 RX ORDER — DIPHENHYDRAMINE HYDROCHLORIDE 50 MG/ML
50 INJECTION INTRAMUSCULAR; INTRAVENOUS ONCE AS NEEDED
Status: CANCELLED | OUTPATIENT
Start: 2024-12-17

## 2024-12-17 RX ORDER — SODIUM CHLORIDE 0.9 % (FLUSH) 0.9 %
10 SYRINGE (ML) INJECTION
Status: DISCONTINUED | OUTPATIENT
Start: 2024-12-17 | End: 2024-12-17 | Stop reason: HOSPADM

## 2024-12-17 RX ADMIN — SODIUM CHLORIDE: 9 INJECTION, SOLUTION INTRAVENOUS at 01:12

## 2024-12-17 RX ADMIN — Medication 10 ML: at 11:12

## 2024-12-17 RX ADMIN — SODIUM CHLORIDE 200 MG: 9 INJECTION, SOLUTION INTRAVENOUS at 01:12

## 2024-12-17 NOTE — PLAN OF CARE
Pt here for IV infusion of Keytruda. Pt tolerated well, no reactions noted. Education reviewed r/t medication. Pt questions answered at this time. Pt ambulates off unit, denies any needs before departure. Pt aware of follow up appointments.     Problem: Adult Inpatient Plan of Care  Goal: Plan of Care Review  Outcome: Progressing  Flowsheets (Taken 12/17/2024 1528)  Plan of Care Reviewed With: patient  Goal: Patient-Specific Goal (Individualized)  Outcome: Progressing  Flowsheets (Taken 12/17/2024 1528)  Individualized Care Needs: warm blanket, conversation, coffee, tv  Anxieties, Fears or Concerns: first keytruda tx  Patient/Family-Specific Goals (Include Timeframe): no s/s of reaction  Goal: Optimal Comfort and Wellbeing  Outcome: Progressing  Intervention: Monitor Pain and Promote Comfort  Flowsheets (Taken 12/17/2024 1528)  Pain Management Interventions:   prescribed exercises encouraged   quiet environment facilitated   position adjusted   care clustered  Intervention: Provide Person-Centered Care  Flowsheets (Taken 12/17/2024 1528)  Trust Relationship/Rapport:   care explained   questions answered   choices provided   questions encouraged   emotional support provided   reassurance provided   empathic listening provided   thoughts/feelings acknowledged     Problem: Fatigue  Goal: Improved Activity Tolerance  Outcome: Progressing  Intervention: Promote Improved Energy  Flowsheets (Taken 12/17/2024 1528)  Fatigue Management:   frequent rest breaks encouraged   paced activity encouraged   fatigue-related activity identified  Sleep/Rest Enhancement:   regular sleep/rest pattern promoted   relaxation techniques promoted   noise level reduced  Activity Management:   Ambulated -L4   Up in stretcher chair - L1  Environmental Support: rest periods encouraged

## 2024-12-18 ENCOUNTER — DOCUMENTATION ONLY (OUTPATIENT)
Dept: INFUSION THERAPY | Facility: HOSPITAL | Age: 59
End: 2024-12-18
Payer: OTHER GOVERNMENT

## 2024-12-18 NOTE — PROGRESS NOTES
Oncology Nutrition   New Patient Education  Felicia Ruffin   1965  Late entry, RD visit on 12/17/24  Nutrition Education   This is a 59 y.o.female with a medical diagnosis of gastric adenocarcinoma, secondary malignant parietal peritoneum, secondary liver and lung cancer.    Met w/ pt to discuss current nutritional status and nutrition as it relates to cancer and cancer treatment. Pt currently severe nutrition risk. Pt had received tx at [facility] for 5FU. During that time pt experienced severe esophagitis and delayed gastric emptying. Pt eventually had PEG placed but does not used d/t side effect of diarrhea. Pt reports she has tried every formula on the market and they all cause her diarrhea. Pt uses her tube for medications currently. Pt has had esophagus stretched which allows her to eat and swallow more foods. Pt struggles with constipation d/t anal fissures and hemorrhoids. Pt has lost 55# or 36% of body weight in 1 year - considered severe malnutrition per ASPEN guidelines.     RD discussed role in POC. Provided pt with handouts on How to Make a High Calorie Smoothie and High Calorie Food List and Snack Ideas.     Wt Readings from Last 10 Encounters:   12/17/24 43.1 kg (95 lb 0.3 oz)   12/10/24 42.1 kg (92 lb 13 oz)   12/10/24 42.1 kg (92 lb 13 oz)   11/14/24 43.5 kg (95 lb 14.4 oz)   11/05/24 44.2 kg (97 lb 8 oz)   10/31/24 44.5 kg (98 lb 1.7 oz)   03/04/24 52.6 kg (116 lb)   02/26/24 54.4 kg (120 lb)   02/07/24 68 kg (150 lb)   09/22/23 68 kg (150 lb)      [x] PMHx reviewed  [x] Labs reviewed    Educated on food safety and common nutrition impact symptoms associated with chemotherapy treatment. Reinforced the importance of good hydration. Handouts provided.    Answered all nutrition related questions.     Patient provided with dietitian contact number and advised to call with questions or make future appointment if further intervention is needed. RD to follow throughout tx prn.    Raine Gonsalez RDN,  LINDA  12/18/2024  4:39 PM

## 2024-12-20 ENCOUNTER — TELEPHONE (OUTPATIENT)
Dept: HEMATOLOGY/ONCOLOGY | Facility: CLINIC | Age: 59
End: 2024-12-20
Payer: OTHER GOVERNMENT

## 2024-12-20 ENCOUNTER — PATIENT MESSAGE (OUTPATIENT)
Dept: HEMATOLOGY/ONCOLOGY | Facility: CLINIC | Age: 59
End: 2024-12-20
Payer: OTHER GOVERNMENT

## 2024-12-20 NOTE — TELEPHONE ENCOUNTER
Called and spoke with a [facility]' representative to confirm the pt's magic mouth wash prescription was received and that they have the ability to fill her script. Pharmacy representative confirmed that they did receive her rx and they can fill it, but the patient has to pay for it before they can fill it, due to not wanting to waste medications. I voiced understanding.

## 2024-12-30 ENCOUNTER — TELEPHONE (OUTPATIENT)
Dept: HEMATOLOGY/ONCOLOGY | Facility: CLINIC | Age: 59
End: 2024-12-30
Payer: OTHER GOVERNMENT

## 2024-12-30 NOTE — TELEPHONE ENCOUNTER
Patient returned call with VS: 152/90, 73, 99.4. Audibly sounds to be in a lot of pain, moaning while trying to speak with me. Informed her that I have sent her message to Dr Pedroza but it may be best for her to go to the ER for iv pain medication, to have her urine checked and work up for what is causing the increased upper back pain. Patient verbalized understanding.    Patient states that she started having right upper back pain yesterday. Taking her prescribed pain medication but it is not helping. Positive for abdominal pain, chest is sore, and the area around her feeding tube is hurting more than usual. Denies SOB, constipation, diarrhea, or vomiting. Having dysuria and states that her urine is concentrated. Her  is on his way home from work now and will check her vital signs for her. Patient to call me back with VS reading.    ----- Message from [place] sent at 12/30/2024  8:42 AM CST -----  Type: Needs Medical Advice  Who Called:  Patient   Symptoms (please be specific):  UPPER BACK PAIN ON RIGHT SIDE  How long has patient had these symptoms:    Pharmacy name and phone #:    Best Call Back Number: [phone]  Additional Information: Patient is requesting a call back from the nurse regarding symptoms that she think may be side affects from chemo pill. Patient stated she didn't take her meds. for today.

## 2024-12-30 NOTE — TELEPHONE ENCOUNTER
Informed patient of Dr Pedroza's recommendation and she stated that she just arrived to the [facility] ER.

## 2025-01-02 ENCOUNTER — TELEPHONE (OUTPATIENT)
Dept: HEMATOLOGY/ONCOLOGY | Facility: CLINIC | Age: 60
End: 2025-01-02

## 2025-01-02 DIAGNOSIS — C16.9 GASTRIC ADENOCARCINOMA: Primary | ICD-10-CM

## 2025-01-02 NOTE — TELEPHONE ENCOUNTER
Left VM for patient to contact clinic to discuss HIDA scan, direct bili----and holding oral chemo for now.

## 2025-01-03 ENCOUNTER — TELEPHONE (OUTPATIENT)
Dept: HEMATOLOGY/ONCOLOGY | Facility: CLINIC | Age: 60
End: 2025-01-03

## 2025-01-03 NOTE — TELEPHONE ENCOUNTER
----- Message from Sunny sent at 1/3/2025  1:19 PM CST -----  Type:  Needs Medical Advice    Who Called: sister Maria Del Carmen      Would the patient rather a call back or a response via [facility]? Call back    Best Call Back Number: [phone]    Additional Information: Sts she would like to talk to the nurse has questions.  Sts pt is at the end stages said she wants to know about hospice and isn't sure what can be done.    Sts she would like to be called  and not the pt doesn't want to upset her.  Please advise -- Thank you

## 2025-01-03 NOTE — TELEPHONE ENCOUNTER
----- Message from Dania sent at 1/3/2025  2:01 PM CST -----  Contact: Sister  Type:  Patient Returning Call    Who Called:Sister Maria Del Carmen   Who Left Message for Patient:Leia  Does the patient know what this is regarding?:yes  Would the patient rather a call back or a response via [facility]? call  Best Call Back Number:  Additional Information: please advise and thank you

## 2025-01-03 NOTE — TELEPHONE ENCOUNTER
KARLM vin keith to contact nurse at her convenience to discuss recent ER visit and what dr eduardo is requesting next.

## 2025-01-03 NOTE — TELEPHONE ENCOUNTER
Maria Del Carmen olsen says she is confused, needs assistance going to the bathroom, urine is orange, eating very little, this is an acute decline.  So concern for sepsis    Her recent ultrasound while hospitalized on 12/30/2024 revealed Multiple liver lesions compatible with metastatic disease again noted.  2. Gallbladder sludge.  Ultrasonographer reports a positive sonographic Zhou's sign which should be correlated for cholecystitis.  Note that there is no gallbladder wall thickening.  There is no biliary ductal dilatation.    Labs on 12/30/2024 revealed significant worsening of liver function tests.     Patient has always wanted to continue treatment if she could.  Advise sister to bring her to the emergency room at [facility] to see if there is any acute reversible cause for her decline, keeping in mind they may not be    If there is no acute cause that can be reversed then we would recommend hospice at that time    Sister understands the plan, we will send message to nurse to communicate with the emergency room and give them a heads up

## 2025-01-04 PROBLEM — E43 SEVERE MALNUTRITION: Status: ACTIVE | Noted: 2025-01-04

## 2025-01-04 PROBLEM — N17.9 ACUTE RENAL FAILURE: Status: ACTIVE | Noted: 2025-01-04

## 2025-01-04 PROBLEM — E44.0 MODERATE PROTEIN-CALORIE MALNUTRITION: Chronic | Status: ACTIVE | Noted: 2025-01-04

## 2025-01-04 PROBLEM — R10.9 INTRACTABLE ABDOMINAL PAIN: Status: ACTIVE | Noted: 2025-01-04

## 2025-01-04 PROBLEM — Z71.89 ADVANCE CARE PLANNING: Status: ACTIVE | Noted: 2025-01-04

## 2025-01-04 PROBLEM — C80.1 BILIARY OBSTRUCTION DUE TO CANCER: Status: ACTIVE | Noted: 2025-01-04

## 2025-01-04 PROBLEM — K83.1 BILIARY OBSTRUCTION DUE TO CANCER: Status: ACTIVE | Noted: 2025-01-04

## 2025-01-05 PROBLEM — R10.9 ABDOMINAL PAIN: Status: ACTIVE | Noted: 2025-01-05

## 2025-01-06 PROBLEM — C16.9 GASTRIC ADENOCARCINOMA: Chronic | Status: ACTIVE | Noted: 2023-07-05

## 2025-01-06 PROBLEM — D53.9 MACROCYTIC ANEMIA: Status: ACTIVE | Noted: 2025-01-06

## 2025-01-06 PROBLEM — K80.01 CALCULUS OF GALLBLADDER WITH ACUTE CHOLECYSTITIS AND OBSTRUCTION: Status: ACTIVE | Noted: 2025-01-06

## 2025-01-07 ENCOUNTER — TELEPHONE (OUTPATIENT)
Dept: PSYCHIATRY | Facility: CLINIC | Age: 60
End: 2025-01-07
Payer: OTHER GOVERNMENT

## 2025-01-07 ENCOUNTER — TELEPHONE (OUTPATIENT)
Dept: HEMATOLOGY/ONCOLOGY | Facility: CLINIC | Age: 60
End: 2025-01-07
Payer: OTHER GOVERNMENT

## 2025-01-07 ENCOUNTER — TELEPHONE (OUTPATIENT)
Dept: REHABILITATION | Facility: HOSPITAL | Age: 60
End: 2025-01-07
Payer: OTHER GOVERNMENT

## 2025-01-07 NOTE — TELEPHONE ENCOUNTER
----- Message from [employer] sent at 1/7/2025  9:11 AM CST -----  Type: Needs Medical Advice  Who Called:  Bertin (  )    Best Call Back Number:   :   [phone]                     Additional Information: Prince states his wife is in hospital and terminal ill and will not be  attending any appointments , please call to further discuss thank you

## 2025-01-07 NOTE — TELEPHONE ENCOUNTER
----- Message from [employer] sent at 1/7/2025  9:11 AM CST -----  Type: Needs Medical Advice  Who Called:  Bertin (  )    Best Call Back Number:   :   [phone]                     Additional Information: Prince states his wife is in hospital and terminal ill and will not be  attending any appointments , please call to further discuss thank you

## 2025-01-07 NOTE — TELEPHONE ENCOUNTER
Called patient . Patient  did not answer. Left voice mail for him to call back.       ----- Message from [employer] sent at 1/7/2025  9:20 AM CST -----  Type: Needs Medical Advice  Who Called:  Bertin (  )    Best Call Back Number:   :   [phone]                     Additional Information: Prince states his wife is in hospital and terminal ill and will not be  attending any appointments , please call to further discuss thank you

## 2025-01-07 NOTE — TELEPHONE ENCOUNTER
Spoke with isrrael---patient is pursuing hospice----and he was just making sure we knew to cancel her appts today.    Nurse validated dr eduardo spoke with sister last week and discussed poc---transitioning to hospice if nothing acute could be done to turn things around.    alexander Jenkins was with patient at the time of phone call.

## 2025-01-07 NOTE — TELEPHONE ENCOUNTER
Returned call to pt,spoke with  Prince,to cancel all pt's future apts with [facility], pt will be admitted to hospice care. Apt cancelled as requested. Pt thanked me for the call.  ----- Message from [employer] sent at 1/7/2025  9:21 AM CST -----  Type: Needs Medical Advice  Who Called:  Bertin (  )    Best Call Back Number:   :   [phone]                     Additional Information: Prince states his wife is in hospital and terminal ill and will not be  attending any appointments , please call to further discuss thank you

## (undated) DEVICE — SUT PROLENE 2-0 30 SH

## (undated) DEVICE — SYR ONLY LUER LOCK 20CC

## (undated) DEVICE — SUT VICRYL 3-0 27 SH

## (undated) DEVICE — GOWN SURGICAL X-LARGE

## (undated) DEVICE — SEE MEDLINE ITEM 154981

## (undated) DEVICE — DRAPE C-ARM ELAS CLIP 42X120IN

## (undated) DEVICE — SOL NACL 0.9% INJ PF/50151

## (undated) DEVICE — TROCAR ENDOPATH XCEL 5X100MM

## (undated) DEVICE — BLADE SURG CARBON STEEL SZ11

## (undated) DEVICE — SEE MEDLINE ITEM 146417

## (undated) DEVICE — DRESSING TRANS 4X4 TEGADERM

## (undated) DEVICE — NDL HYPO REG 25G X 1 1/2

## (undated) DEVICE — SUT CTD VICRYL 3-0 VIL BR

## (undated) DEVICE — ADHESIVE DERMABOND ADVANCED

## (undated) DEVICE — TOWEL OR DISP STRL BLUE 4/PK

## (undated) DEVICE — TRAY MINOR GEN SURG OMC

## (undated) DEVICE — SET DECANTER MEDICHOICE

## (undated) DEVICE — ELECTRODE REM PLYHSV RETURN 9

## (undated) DEVICE — SUT MCRYL PLUS 4-0 PS2 27IN

## (undated) DEVICE — BRIEF MESH LARGE

## (undated) DEVICE — DRAPE ABDOMINAL TIBURON 14X11

## (undated) DEVICE — TAPE SILK 3IN

## (undated) DEVICE — PANTIES FEMININE NAPKIN LG/XLG

## (undated) DEVICE — TIP YANKAUERS BULB NO VENT

## (undated) DEVICE — SYR DISP LL 5CC

## (undated) DEVICE — SEE MEDLINE ITEM 157117

## (undated) DEVICE — LUBRICANT SURGILUBE 2 OZ

## (undated) DEVICE — DRESSING ANTIMICROBIAL 1 INCH

## (undated) DEVICE — CONTAINER SPECIMEN OR STER 4OZ

## (undated) DEVICE — IRRIGATOR ENDOSCOPY DISP.

## (undated) DEVICE — TUBING HF INSUFFLATION W/ FLTR

## (undated) DEVICE — DRAPE CORETEMP FLD WRM 56X62IN

## (undated) DEVICE — DRESSING TELFA STRL 4X3 LF

## (undated) DEVICE — PREP KIT 14

## (undated) DEVICE — SEE MEDLINE ITEM 157148

## (undated) DEVICE — NDL 22GA X1 1/2 REG BEVEL

## (undated) DEVICE — SUT VICRYL CTD 2-0 GI 27 SH

## (undated) DEVICE — DRESSING SPONGE 16PLY 4X4 NS

## (undated) DEVICE — APPLICATOR CHLORAPREP ORN 26ML

## (undated) DEVICE — DRAPE T THYROID STERILE

## (undated) DEVICE — SUT MONOCYRL 4-0 PS2 UND

## (undated) DEVICE — TRAY MINOR GEN SURG

## (undated) DEVICE — SOL NS 1000CC

## (undated) DEVICE — SEALER LIGASURE CRV 18.8CM

## (undated) DEVICE — SEE MEDLINE ITEM 152622

## (undated) DEVICE — SEE MEDLINE ITEM 152487